# Patient Record
Sex: FEMALE | Race: WHITE | NOT HISPANIC OR LATINO | Employment: OTHER | URBAN - METROPOLITAN AREA
[De-identification: names, ages, dates, MRNs, and addresses within clinical notes are randomized per-mention and may not be internally consistent; named-entity substitution may affect disease eponyms.]

---

## 2021-03-02 ENCOUNTER — TRANSFERRED RECORDS (OUTPATIENT)
Dept: HEALTH INFORMATION MANAGEMENT | Facility: CLINIC | Age: 86
End: 2021-03-02

## 2022-05-11 ENCOUNTER — HOSPITAL ENCOUNTER (INPATIENT)
Facility: CLINIC | Age: 87
LOS: 10 days | Discharge: NURSING, REHAB OR LONG-TERM CARE | DRG: 555 | End: 2022-05-23
Attending: EMERGENCY MEDICINE | Admitting: HOSPITALIST
Payer: MEDICARE

## 2022-05-11 DIAGNOSIS — R05.9 COUGH: ICD-10-CM

## 2022-05-11 DIAGNOSIS — M25.561 ACUTE BILATERAL KNEE PAIN: ICD-10-CM

## 2022-05-11 DIAGNOSIS — L29.9 ITCHING: ICD-10-CM

## 2022-05-11 DIAGNOSIS — I10 BENIGN ESSENTIAL HYPERTENSION: Primary | ICD-10-CM

## 2022-05-11 DIAGNOSIS — M62.81 GENERALIZED MUSCLE WEAKNESS: ICD-10-CM

## 2022-05-11 DIAGNOSIS — U07.1 INFECTION DUE TO 2019 NOVEL CORONAVIRUS: ICD-10-CM

## 2022-05-11 DIAGNOSIS — M25.562 ACUTE BILATERAL KNEE PAIN: ICD-10-CM

## 2022-05-11 DIAGNOSIS — R52 ACHES: ICD-10-CM

## 2022-05-11 PROCEDURE — 99285 EMERGENCY DEPT VISIT HI MDM: CPT | Mod: 25

## 2022-05-12 ENCOUNTER — APPOINTMENT (OUTPATIENT)
Dept: PHYSICAL THERAPY | Facility: CLINIC | Age: 87
DRG: 555 | End: 2022-05-12
Attending: HOSPITALIST
Payer: MEDICARE

## 2022-05-12 ENCOUNTER — APPOINTMENT (OUTPATIENT)
Dept: MRI IMAGING | Facility: CLINIC | Age: 87
DRG: 555 | End: 2022-05-12
Attending: PHYSICIAN ASSISTANT
Payer: MEDICARE

## 2022-05-12 ENCOUNTER — APPOINTMENT (OUTPATIENT)
Dept: GENERAL RADIOLOGY | Facility: CLINIC | Age: 87
DRG: 555 | End: 2022-05-12
Attending: EMERGENCY MEDICINE
Payer: MEDICARE

## 2022-05-12 PROBLEM — M62.81 GENERALIZED MUSCLE WEAKNESS: Status: ACTIVE | Noted: 2022-05-12

## 2022-05-12 PROBLEM — M25.561 ACUTE BILATERAL KNEE PAIN: Status: ACTIVE | Noted: 2022-05-12

## 2022-05-12 PROBLEM — U07.1 INFECTION DUE TO 2019 NOVEL CORONAVIRUS: Status: ACTIVE | Noted: 2022-05-12

## 2022-05-12 PROBLEM — M25.562 ACUTE BILATERAL KNEE PAIN: Status: ACTIVE | Noted: 2022-05-12

## 2022-05-12 LAB
ANION GAP SERPL CALCULATED.3IONS-SCNC: 6 MMOL/L (ref 3–14)
BASOPHILS # BLD AUTO: 0 10E3/UL (ref 0–0.2)
BASOPHILS NFR BLD AUTO: 0 %
BUN SERPL-MCNC: 28 MG/DL (ref 7–30)
CALCIUM SERPL-MCNC: 9.3 MG/DL (ref 8.5–10.1)
CHLORIDE BLD-SCNC: 103 MMOL/L (ref 94–109)
CK SERPL-CCNC: 652 U/L (ref 30–225)
CO2 SERPL-SCNC: 26 MMOL/L (ref 20–32)
CREAT SERPL-MCNC: 1.22 MG/DL (ref 0.52–1.04)
EOSINOPHIL # BLD AUTO: 0 10E3/UL (ref 0–0.7)
EOSINOPHIL NFR BLD AUTO: 0 %
ERYTHROCYTE [DISTWIDTH] IN BLOOD BY AUTOMATED COUNT: 12.5 % (ref 10–15)
GFR SERPL CREATININE-BSD FRML MDRD: 43 ML/MIN/1.73M2
GLUCOSE BLD-MCNC: 180 MG/DL (ref 70–99)
GLUCOSE BLDC GLUCOMTR-MCNC: 105 MG/DL (ref 70–99)
GLUCOSE BLDC GLUCOMTR-MCNC: 140 MG/DL (ref 70–99)
GLUCOSE BLDC GLUCOMTR-MCNC: 88 MG/DL (ref 70–99)
GLUCOSE BLDC GLUCOMTR-MCNC: 89 MG/DL (ref 70–99)
HCT VFR BLD AUTO: 35.9 % (ref 35–47)
HGB BLD-MCNC: 11.6 G/DL (ref 11.7–15.7)
HOLD SPECIMEN: NORMAL
HOLD SPECIMEN: NORMAL
IMM GRANULOCYTES # BLD: 0.1 10E3/UL
IMM GRANULOCYTES NFR BLD: 1 %
LYMPHOCYTES # BLD AUTO: 0.5 10E3/UL (ref 0.8–5.3)
LYMPHOCYTES NFR BLD AUTO: 5 %
MCH RBC QN AUTO: 30.2 PG (ref 26.5–33)
MCHC RBC AUTO-ENTMCNC: 32.3 G/DL (ref 31.5–36.5)
MCV RBC AUTO: 94 FL (ref 78–100)
MONOCYTES # BLD AUTO: 0.9 10E3/UL (ref 0–1.3)
MONOCYTES NFR BLD AUTO: 11 %
NEUTROPHILS # BLD AUTO: 7 10E3/UL (ref 1.6–8.3)
NEUTROPHILS NFR BLD AUTO: 83 %
NRBC # BLD AUTO: 0 10E3/UL
NRBC BLD AUTO-RTO: 0 /100
PLATELET # BLD AUTO: 211 10E3/UL (ref 150–450)
POTASSIUM BLD-SCNC: 4.1 MMOL/L (ref 3.4–5.3)
RBC # BLD AUTO: 3.84 10E6/UL (ref 3.8–5.2)
SARS-COV-2 RNA RESP QL NAA+PROBE: POSITIVE
SODIUM SERPL-SCNC: 135 MMOL/L (ref 133–144)
WBC # BLD AUTO: 8.4 10E3/UL (ref 4–11)

## 2022-05-12 PROCEDURE — 73560 X-RAY EXAM OF KNEE 1 OR 2: CPT | Mod: 50

## 2022-05-12 PROCEDURE — 36415 COLL VENOUS BLD VENIPUNCTURE: CPT | Performed by: EMERGENCY MEDICINE

## 2022-05-12 PROCEDURE — G0378 HOSPITAL OBSERVATION PER HR: HCPCS

## 2022-05-12 PROCEDURE — C9803 HOPD COVID-19 SPEC COLLECT: HCPCS

## 2022-05-12 PROCEDURE — 97110 THERAPEUTIC EXERCISES: CPT | Mod: GP

## 2022-05-12 PROCEDURE — 96372 THER/PROPH/DIAG INJ SC/IM: CPT | Performed by: HOSPITALIST

## 2022-05-12 PROCEDURE — 250N000012 HC RX MED GY IP 250 OP 636 PS 637: Performed by: HOSPITALIST

## 2022-05-12 PROCEDURE — 97162 PT EVAL MOD COMPLEX 30 MIN: CPT | Mod: GP

## 2022-05-12 PROCEDURE — 82550 ASSAY OF CK (CPK): CPT | Performed by: EMERGENCY MEDICINE

## 2022-05-12 PROCEDURE — 250N000013 HC RX MED GY IP 250 OP 250 PS 637: Performed by: EMERGENCY MEDICINE

## 2022-05-12 PROCEDURE — 82962 GLUCOSE BLOOD TEST: CPT

## 2022-05-12 PROCEDURE — 80048 BASIC METABOLIC PNL TOTAL CA: CPT | Performed by: EMERGENCY MEDICINE

## 2022-05-12 PROCEDURE — 85025 COMPLETE CBC W/AUTO DIFF WBC: CPT | Performed by: EMERGENCY MEDICINE

## 2022-05-12 PROCEDURE — 250N000013 HC RX MED GY IP 250 OP 250 PS 637: Performed by: HOSPITALIST

## 2022-05-12 PROCEDURE — 99220 PR INITIAL OBSERVATION CARE,LEVEL III: CPT | Performed by: HOSPITALIST

## 2022-05-12 PROCEDURE — 99207 PR NO BILLABLE SERVICE THIS VISIT: CPT | Performed by: HOSPITALIST

## 2022-05-12 PROCEDURE — U0005 INFEC AGEN DETEC AMPLI PROBE: HCPCS | Performed by: EMERGENCY MEDICINE

## 2022-05-12 PROCEDURE — 97530 THERAPEUTIC ACTIVITIES: CPT | Mod: GP

## 2022-05-12 PROCEDURE — 72148 MRI LUMBAR SPINE W/O DYE: CPT | Mod: MG

## 2022-05-12 RX ORDER — ASPIRIN 325 MG
650 TABLET ORAL ONCE
Status: COMPLETED | OUTPATIENT
Start: 2022-05-12 | End: 2022-05-12

## 2022-05-12 RX ORDER — TRIAMCINOLONE ACETONIDE 1 MG/G
CREAM TOPICAL PRN
COMMUNITY
End: 2023-01-01

## 2022-05-12 RX ORDER — AMLODIPINE BESYLATE 2.5 MG/1
2.5 TABLET ORAL DAILY
Status: DISCONTINUED | OUTPATIENT
Start: 2022-05-12 | End: 2022-05-19

## 2022-05-12 RX ORDER — CLOPIDOGREL BISULFATE 75 MG/1
75 TABLET ORAL DAILY
Status: DISCONTINUED | OUTPATIENT
Start: 2022-05-12 | End: 2022-05-23 | Stop reason: HOSPADM

## 2022-05-12 RX ORDER — BUMETANIDE 0.5 MG/1
0.5 TABLET ORAL DAILY
COMMUNITY
Start: 2023-01-01

## 2022-05-12 RX ORDER — ACETAMINOPHEN 500 MG
1000 TABLET ORAL AT BEDTIME
Status: DISCONTINUED | OUTPATIENT
Start: 2022-05-12 | End: 2022-05-23 | Stop reason: HOSPADM

## 2022-05-12 RX ORDER — LISINOPRIL 20 MG/1
20 TABLET ORAL DAILY
Status: DISCONTINUED | OUTPATIENT
Start: 2022-05-12 | End: 2022-05-23 | Stop reason: HOSPADM

## 2022-05-12 RX ORDER — CLOPIDOGREL BISULFATE 75 MG/1
75 TABLET ORAL DAILY
COMMUNITY
End: 2023-01-01

## 2022-05-12 RX ORDER — BUMETANIDE 0.5 MG/1
0.5 TABLET ORAL DAILY
Status: DISCONTINUED | OUTPATIENT
Start: 2022-05-12 | End: 2022-05-23 | Stop reason: HOSPADM

## 2022-05-12 RX ORDER — LISINOPRIL 20 MG/1
20 TABLET ORAL DAILY
COMMUNITY
End: 2023-01-01

## 2022-05-12 RX ORDER — DEXTROSE MONOHYDRATE 25 G/50ML
25-50 INJECTION, SOLUTION INTRAVENOUS
Status: DISCONTINUED | OUTPATIENT
Start: 2022-05-12 | End: 2022-05-23 | Stop reason: HOSPADM

## 2022-05-12 RX ORDER — AMLODIPINE BESYLATE 2.5 MG/1
2.5 TABLET ORAL DAILY
Status: ON HOLD | COMMUNITY
End: 2022-05-23

## 2022-05-12 RX ORDER — AMOXICILLIN 250 MG
1 CAPSULE ORAL 2 TIMES DAILY PRN
Status: DISCONTINUED | OUTPATIENT
Start: 2022-05-12 | End: 2022-05-23 | Stop reason: HOSPADM

## 2022-05-12 RX ORDER — NICOTINE POLACRILEX 4 MG
15-30 LOZENGE BUCCAL
Status: DISCONTINUED | OUTPATIENT
Start: 2022-05-12 | End: 2022-05-23 | Stop reason: HOSPADM

## 2022-05-12 RX ORDER — ACETAMINOPHEN 650 MG/1
650 SUPPOSITORY RECTAL EVERY 6 HOURS PRN
Status: DISCONTINUED | OUTPATIENT
Start: 2022-05-12 | End: 2022-05-23 | Stop reason: HOSPADM

## 2022-05-12 RX ORDER — NYSTATIN 100000 [USP'U]/G
POWDER TOPICAL PRN
COMMUNITY
End: 2022-06-09

## 2022-05-12 RX ORDER — ONDANSETRON 4 MG/1
4 TABLET, ORALLY DISINTEGRATING ORAL EVERY 6 HOURS PRN
Status: DISCONTINUED | OUTPATIENT
Start: 2022-05-12 | End: 2022-05-23 | Stop reason: HOSPADM

## 2022-05-12 RX ORDER — ACETAMINOPHEN 500 MG
500 TABLET ORAL EVERY 6 HOURS PRN
COMMUNITY

## 2022-05-12 RX ORDER — ONDANSETRON 2 MG/ML
4 INJECTION INTRAMUSCULAR; INTRAVENOUS EVERY 6 HOURS PRN
Status: DISCONTINUED | OUTPATIENT
Start: 2022-05-12 | End: 2022-05-23 | Stop reason: HOSPADM

## 2022-05-12 RX ORDER — AMOXICILLIN 250 MG
2 CAPSULE ORAL 2 TIMES DAILY PRN
Status: DISCONTINUED | OUTPATIENT
Start: 2022-05-12 | End: 2022-05-23 | Stop reason: HOSPADM

## 2022-05-12 RX ORDER — ACETAMINOPHEN 325 MG/1
650 TABLET ORAL EVERY 6 HOURS PRN
Status: DISCONTINUED | OUTPATIENT
Start: 2022-05-12 | End: 2022-05-23 | Stop reason: HOSPADM

## 2022-05-12 RX ADMIN — ACETAMINOPHEN 650 MG: 325 TABLET ORAL at 17:15

## 2022-05-12 RX ADMIN — DICLOFENAC SODIUM 2 G: 10 GEL TOPICAL at 15:13

## 2022-05-12 RX ADMIN — ACETAMINOPHEN 650 MG: 325 TABLET ORAL at 08:01

## 2022-05-12 RX ADMIN — LISINOPRIL 20 MG: 20 TABLET ORAL at 17:15

## 2022-05-12 RX ADMIN — DICLOFENAC SODIUM 2 G: 10 GEL TOPICAL at 22:37

## 2022-05-12 RX ADMIN — INSULIN ASPART 1 UNITS: 100 INJECTION, SOLUTION INTRAVENOUS; SUBCUTANEOUS at 09:28

## 2022-05-12 RX ADMIN — DICLOFENAC SODIUM 2 G: 10 GEL TOPICAL at 17:24

## 2022-05-12 RX ADMIN — CLOPIDOGREL BISULFATE 75 MG: 75 TABLET ORAL at 17:15

## 2022-05-12 RX ADMIN — ASPIRIN 325 MG ORAL TABLET 650 MG: 325 PILL ORAL at 03:21

## 2022-05-12 RX ADMIN — AMLODIPINE BESYLATE 2.5 MG: 2.5 TABLET ORAL at 17:15

## 2022-05-12 RX ADMIN — ACETAMINOPHEN 1000 MG: 500 TABLET, FILM COATED ORAL at 22:37

## 2022-05-12 ASSESSMENT — ENCOUNTER SYMPTOMS
MYALGIAS: 1
WEAKNESS: 1

## 2022-05-12 NOTE — ED NOTES
Bed: ED05  Expected date:   Expected time:   Means of arrival:   Comments:  Stephanie 531 [id number] can't bear weight on left leg, no recent fall eta 2203

## 2022-05-12 NOTE — PROGRESS NOTES
Pt has consistently told nursing and PT (L) knee is more painful.  Call placed to TRISTIAN Agustin to clarify what to CT scan and she said hold off on CT scans until lumbar MRI completed.  Blood sugars were 88  & 105.  Solorio intact but does have an odor to it.  Peter ankles/ft are edematous.  RA is 95% but pt does have a nonproductive cough.  Pt denies any SOB or breathing issues.

## 2022-05-12 NOTE — PROGRESS NOTES
.RECEIVING UNIT ED HANDOFF REVIEW    ED Nurse Handoff Report was reviewed by: Emilee Cason RN on May 12, 2022 at 10:58 AM

## 2022-05-12 NOTE — PROGRESS NOTES
Hospitalist brief update note    Patient was evaluated this afternoon after she arrived to floor from ER.  Patient reports bilateral knee pain, more on right than left.  She denies any increasing dyspnea, cough, chest pain.  No runny nose.  No nausea or diarrhea.    Labs on admission noted, admission H&P reviewed.  X-ray of the knee shows a small effusion on right and small to moderate on the left.  Continue Tylenol for pain control.  Use ice pack.  Patient on Plavix, given history of fall, this could be hemarthrosis, although pain not significantly worse and is controlled with tylenol  Ortho consult.   PTA meds reviewed, resumed  AM labs including CK.   Hold diuretic today and as long as good PO intake, and CK trends down, resume in AM    Discussed with patient and KAYDEN Arriola MD  Hospitalist

## 2022-05-12 NOTE — ED PROVIDER NOTES
"  History   Chief Complaint:  Leg Pain     HPI   Sarah Noble is a 87 year old female with history of hypertension, type II diabetes, and a CVA who presents via EMS For evaluation of leg pain. Tonight after sitting down for an extended period of time the patient was unable to get up and bear weight due to new pain and weakness in her right leg. Her daughter was able to help her to the bathroom with her walker but when she was unable to get off the toilet EMS was called to help her. She continued to be unable to bear weight on the right leg with EMS prompting them to bring her into the ED. Here in the ED she complains primarily of pain in the bottom of her right foot. She notes that she typically does not sit down for as long as she did tonight. She denies any recent falls or trauma to the leg.     Review of Systems   Musculoskeletal: Positive for myalgias (right leg, right foot).   Neurological: Positive for weakness.   All other systems reviewed and are negative.      Allergies:  Aspirin  Sulfa     Medications:  Cefaclor   Bumex  Lisinopril  Triamcinolone   Plavix   Amlodipine     Past Medical History:     CVA  Hypertension  Diabetes mellitus, type II   Chronic kidney disease   Sleep apnea  Chronic back pain   GERD   Spinal stenosis   Malignant neoplasm of uterus     Past Surgical History:    Knee replacement bilateral  Hernia repair  Hysterectomy, bilateral salpingo-oophorectomy, bilateral pelvic/PA lymphadenectomy     Family History:    Heart disease - Father   Alzheimer's - Mother     Social History:  The patient presents to the ED via EMS.   The patient lives with her daughter.     Physical Exam     Patient Vitals for the past 24 hrs:   BP Temp Temp src Pulse Resp SpO2 Height   05/11/22 2329 (!) 131/98 99.1  F (37.3  C) Oral 90 18 94 % 1.626 m (5' 4\")       Physical Exam  Eye:  Pupils are equal, round, and reactive.  Extraocular movements intact.    ENT:  No rhinorrhea.  Moist mucus membranes.  Normal " tongue and tonsil.    Cardiac:  Regular rate and rhythm.  No murmurs, gallops, or rubs.    Pulmonary:  Clear to auscultation bilaterally.  No wheezes, rales, or rhonchi.    Abdomen:  Positive bowel sounds.  Abdomen is soft and non-distended, without focal tenderness.    Musculoskeletal:  Normal movement of all extremities without evidence for deficit. There is no reproducible pain with ranging the joints of the right leg.     Skin:  Warm and dry without rashes.    Neurologic:  Non-focal exam without asymmetric weakness or numbness. Focused right leg exam shows 5/5 strength at the hip, knee, and ankle with sensation intact throughout.     Psychiatric:  Normal affect with appropriate interaction with examiner.      Emergency Department Course     Imaging:  XR Knee Port Bilateral 1/2 Views    (Results Pending)   Report per radiology    Laboratory:  Labs Ordered and Resulted from Time of ED Arrival to Time of ED Departure   BASIC METABOLIC PANEL - Abnormal       Result Value    Sodium 135      Potassium 4.1      Chloride 103      Carbon Dioxide (CO2) 26      Anion Gap 6      Urea Nitrogen 28      Creatinine 1.22 (*)     Calcium 9.3      Glucose 180 (*)     GFR Estimate 43 (*)    CK TOTAL - Abnormal     (*)    COVID-19 VIRUS (CORONAVIRUS) BY PCR - Abnormal    SARS CoV2 PCR Positive (*)    CBC WITH PLATELETS AND DIFFERENTIAL - Abnormal    WBC Count 8.4      RBC Count 3.84      Hemoglobin 11.6 (*)     Hematocrit 35.9      MCV 94      MCH 30.2      MCHC 32.3      RDW 12.5      Platelet Count 211      % Neutrophils 83      % Lymphocytes 5      % Monocytes 11      % Eosinophils 0      % Basophils 0      % Immature Granulocytes 1      NRBCs per 100 WBC 0      Absolute Neutrophils 7.0      Absolute Lymphocytes 0.5 (*)     Absolute Monocytes 0.9      Absolute Eosinophils 0.0      Absolute Basophils 0.0      Absolute Immature Granulocytes 0.1      Absolute NRBCs 0.0          Emergency Department Course:     Reviewed:  I  reviewed nursing notes, vitals and past medical history    Assessments:  2358:  I obtained history and examined the patient as noted above.     0231: I updated and reassessed the patient.     Consults:  0251: I spoke with Dr. Slaughter of the hospitalist service regarding patient's presentation, findings, and plan of care.     Interventions:  Medications   sodium chloride (PF) 0.9% PF flush 3 mL (has no administration in time range)   sodium chloride (PF) 0.9% PF flush 3 mL (has no administration in time range)   aspirin (ASA) tablet 650 mg (has no administration in time range)       Disposition:  The patient was admitted to the hospital under the care of Dr. Slaughter.     Impression & Plan     Medical Decision Making:  This 87-year-old woman with chronic knee pain presents to us with complaints of increasing pain and weakness today.  The patient struggles to get about due to her chronic knee pain and weight.  However, when trying to get out of a chair this evening, it was even more difficult than usual and her daughter had to help her to the bathroom.  After she finished using the toilet, she was unable to get off the toilet on her own and fire had to come and help her.  She complained that both of her knees were terribly achy and she was weak and she was brought to the emergency department.    On exam, there is no evidence of trauma.  Her vital signs and labs are reassuring.  However, with an ambulatory challenge, she is unable to ambulate.  Therefore, I will obtain x-rays of her knees and I will admit her to the hospitalist service.    On the wait to be admitted, her COVID test returned positive.  She does note that there have been several family members with COVID recently.  However, she has not experienced nasal congestion, sore throat, or cough.  Nonetheless, she will be isolated and treated appropriately.  This may explain some of her weakness.  Hospitalist service was updated and the patient will be  admitted.     Diagnosis:    ICD-10-CM    1. Acute bilateral knee pain  M25.561     M25.562    2. Infection due to 2019 novel coronavirus  U07.1    3. Generalized muscle weakness  M62.81           Scribe Disclosure:  I, Nabil Mancia, am serving as a scribe at 11:26 PM on 5/11/2022 to document services personally performed by Trierweiler, Chad A, MD based on my observations and the provider's statements to me.           Trierweiler, Chad A, MD  05/12/22 1923

## 2022-05-12 NOTE — ED NOTES
"[facility]  ED Nurse Handoff Report    ED Chief complaint: Leg Pain (States was watching the news in her chair, when she went to get up her \"Right leg would not support me' her daughter helped her to bathroom, but then was unable to get up again and EMS was called. Denies headache, speech clear)      ED Diagnosis:   Final diagnoses:   Acute bilateral knee pain   Infection due to 2019 novel coronavirus   Generalized muscle weakness       Code Status: to be addressed by admitting doctor    Allergies: No Known Allergies    Patient Story: Pt lives at home with adult daughter. Pt states uses a walker. Pt reports no trauma. Was sitting in chair longer than usual about 3 hours watching the weather. Went to get up and R leg did not want to support her weight. And C/O pain in R foot. Daughter helped her up to bathroom but after sitting on toilet could not get up r/t weakness in R leg and pain in R leg.    Focused Assessment:  Pt alert voices needs. Moves all extremities, Hx bilateral knee replacement in past and use of walker. When attempting to walk in ED pt unable to stand with assist of 2 and use of walker. C/o pain in both legs and weakness. Pt has existing Solorio catheter to drainage bag. Had low grade fever upon arrival. Denies cough/SOB/ no sore throat or nausea.    Treatments and/or interventions provided: IV access, labs  Patient's response to treatments and/or interventions: tolerated well    To be done/followed up on inpatient unit:  unknown    Does this patient have any cognitive concerns?: none    Activity level - Baseline/Home:  Independent use of walker baseline  Activity Level - Current:   Unknown    Patient's Preferred language: English   Needed?: No    Isolation: None  Infection: Not Applicable  Patient tested for COVID 19 prior to admission: YES  Bariatric?: No    Vital Signs:   Vitals:    05/11/22 2329   BP: (!) 131/98   Pulse: 90   Resp: 18   Temp: 99.1  F (37.3  C)   TempSrc: " "Oral   SpO2: 94%   Height: 1.626 m (5' 4\")       Cardiac Rhythm:     Was the PSS-3 completed:   Yes  What interventions are required if any?               Family Comments: Daughter remains at home. Daughter also states that at home nurse changes joseph catheter at the end of each month.     Daughter would like to be updated throughout stay. Josefa Pompa [phone]      OBS brochure/video discussed/provided to patient/family: N/A              Name of person given brochure if not patient: n/a              Relationship to patient: n/a    For the majority of the shift this patient's behavior was Green.   Behavioral interventions performed were none.    ED NURSE PHONE NUMBER: *[phone]       "

## 2022-05-12 NOTE — PHARMACY-ADMISSION MEDICATION HISTORY
Pharmacy Medication History  Admission medication history interview status for the 5/11/2022  admission is complete. See EPIC admission navigator for prior to admission medications     Location of Interview: Phone with daughter and patient  Medication history sources: Daughter (over the phone) and outside med report    Significant changes made to the medication list:  Added: Bumex, Plavix, Lisinopril, Tylenol, Triamcinolone, Amlodipine, and Nystatin  Discontinued: Sucralfte    In the past week, patient estimated taking medication this percent of the time: greater than 90%    Additional medication history information:   None    Medication reconciliation completed by provider prior to medication history? No    Time spent in this activity: 1 hour    Prior to Admission medications    Medication Sig Last Dose Taking? Auth Provider   acetaminophen (TYLENOL) 500 MG tablet Take 1,000 mg by mouth At Bedtime For pain 5/10/2022 at hs Yes Unknown, Entered By History   amLODIPine (NORVASC) 2.5 MG tablet Take 2.5 mg by mouth daily 5/11/2022 at am Yes Unknown, Entered By History   bumetanide (BUMEX) 0.5 MG tablet Take 0.5 mg by mouth daily 5/11/2022 at am Yes Unknown, Entered By History   clopidogrel (PLAVIX) 75 MG tablet Take 75 mg by mouth daily 5/11/2022 at am Yes Unknown, Entered By History   lisinopril (ZESTRIL) 20 MG tablet Take 20 mg by mouth daily 5/11/2022 at am Yes Unknown, Entered By History   nystatin (MYCOSTATIN) 266002 UNIT/GM external powder Apply topically as needed prn at prn Yes Unknown, Entered By History   triamcinolone (KENALOG) 0.1 % external cream Apply topically as needed for irritation prn at prn Yes Unknown, Entered By History       The information provided in this note is only as accurate as the sources available at the time of update(s)

## 2022-05-12 NOTE — PLAN OF CARE
Goal Outcome Evaluation:Pt admitted from ER for (L) knee pain.  Pt COVID (+) but has mild symptoms of a cough.  Pt has existing joseph for retention on admission.  96% on RA.  IS only able to get 500.  Stated (L) knee is painful.  Has jonathan ankle swelling but denies numbness & tingling.  Blood sugar was 105.

## 2022-05-12 NOTE — ED NOTES
Attempted to ambulate patient with walker and assist x2. When attempting to stand patient states she is unable to bear any weight on either leg due to significant pain.

## 2022-05-12 NOTE — PROGRESS NOTES
Ortho brief note    Patient with recent fall at home after right LE weakness reported after prolonged sitting  Patient notes increased pain in right knee with ambulation but no pain at rest and with ROM of the right knee    XR without concern for fracture, right knee revision prosthesis in place without evidence of loosening or cortical stress    CT ordered of right femur/tibia to rule out occult fracture  If negative and patient continues to have pain following a radicular pattern recommend MRI of spine and IP spine or neurosurgery follow-up    TTWB right LE at this time with walker until imaging reviewed    Nikole Blandon PAC

## 2022-05-12 NOTE — H&P
[facility]    History and Physical  Hospitalist     Date of Admission:  5/11/2022    Assessment & Plan   Sarah Noble is a 87 year old female with a past medical history of type 2 diabetes, hypertension, CKD stage III, obesity, chronic knee pain and back pain, chronic urinary retention with a indwelling Solorio catheter presents to hospital after fall for knee pain.    Bilateral knee pain  Patient with chronic bilateral knee pain. Had right knee pain at home now with worsening left knee pain. Has difficulty ambulating at baseline and uses a walker.  Patient underwent bilateral knee replacements approximately 20 years ago. The patient has a history of chronic pain for which she has used opiates in the past, but has been managing with non-opiate medications for the last year, will continue to try to avoid opiates.    likely needs placement in a tcu  -f/u pt, ot  -Follow-up x-rays bilateral knees (delayed due to COVID-19 positive status)  -Possible orthopedic consult depending on what is seen on the x-ray of her knees.  -Voltaren gel to bilateral knees, Tylenol as needed    Elevated CK  Mild elevation. Could be due to covid 19 or due to her sitting for 3 hours prior to her fall.   -encourage po intake.     Covid 19  Asymptomatic. Vaccinated. No indication for intervention.   -covid 19 precautions    Type 2 diabetes  Diet-controlled. Well controlled. Last a1c was 6.8%  -Insulin sliding scale while inpatient    HTN  Resume PTA meds once med rec is complete     CKD stage III  Renal function stable when compared to previous labs  -Avoid nephrotoxic medication  -Monitor renal function    MILLIE  Does not use CPAP    Chronic Urinary retention  Has a chronic indwelling Solorio catheter    Normocytic anemia  Chronic, stable.  Patient denies any bleeding.  -Outpatient follow-up    Code Status   Full Code  DVT ppx: lovenox sc  Expected length of stay less than 2 days      Primary Care Physician   Luther MAJOR  [place]    Chief Complaint   Generalized weakness    History obtained from the patient    History of Present Illness   Sarah Noble is a 87 year old female with a past medical history of type 2 diabetes, hypertension, CKD stage III, obesity, chronic knee pain and back pain, chronic urinary retention with a indwelling Solorio catheter presents to hospital after fall for knee pain.  The patient reports that she has chronic knee pain which is worse if she sits down for an extended period of time.  On the evening of May 11 she sat down for approximately 3 hours and when she went to get up she had 8 out of 10 pain in her right knee causing her weakness.  With assistance of her daughter and her walker she was able to go to the bathroom however when she got up from the bathroom her leg gave out and she fell to the ground.  She did not hit her head or lose consciousness.  Due to pain and difficulty ambulating she called EMS who brought her to the hospital.  She reports soreness in her left knee and pain with movement of both knees.  The patient has a history of bilateral knee replacement approximately 20 years ago.  The patient provides no other complaints and denies any fevers, chills, shortness of breath, chest pain, myalgias, diarrhea, nausea, vomiting.  The patient tested positive for COVID-19 in the emergency room.  When I informed the patient she was apprised as she denies experiencing any symptoms.  The patient has received 3 doses of the Pfizer COVID-19 vaccine with last dose on March 12.  She had sick contact with COVID-19 positive individuals on Mother's Day.    Past Medical History    I have reviewed this patient's medical history and updated it with pertinent information if needed.   Past Medical History:   Diagnosis Date     Diabetes (H)      Hypertension        Past Surgical History   I have reviewed this patient's surgical history and updated it with pertinent information if needed.  Past Surgical History:    Procedure Laterality Date     ORTHOPEDIC SURGERY         Prior to Admission Medications   Prior to Admission Medications   Prescriptions Last Dose Informant Patient Reported? Taking?   SUCRALFATE 1 GM OR TABS   No No   Si TAB 4 TIMES DAILY, BEFORE MEALS AND AT BEDTIME      Facility-Administered Medications: None     Allergies   No Known Allergies    Social History   I have reviewed this patient's social history and updated it with pertinent information if needed. Sarah Noble  reports that she has never smoked. She has never used smokeless tobacco. She reports previous alcohol use. She reports previous drug use.    Family History   I have reviewed this patient's family history and updated it with pertinent information if needed.   Father with heart disease  Mother with Alzheimer's disease    Review of Systems   The 10 point Review of Systems is negative other than noted in the HPI or here.     Physical Exam   Temp: 99.1  F (37.3  C) Temp src: Oral BP: (!) 131/98 Pulse: 90   Resp: 18 SpO2: 94 %      Vital Signs with Ranges  Temp:  [99.1  F (37.3  C)] 99.1  F (37.3  C)  Pulse:  [90] 90  Resp:  [18] 18  BP: (131)/(98) 131/98  SpO2:  [94 %] 94 %  0 lbs 0 oz  Physical Exam  Vitals reviewed.   Constitutional:       Appearance: Normal appearance. She is obese.      Comments: Pleasant lady seen resting in bed comfortably no apparent distress   HENT:      Head: Normocephalic and atraumatic.      Mouth/Throat:      Mouth: Mucous membranes are moist.      Pharynx: Oropharynx is clear.   Eyes:      Extraocular Movements: Extraocular movements intact.      Conjunctiva/sclera: Conjunctivae normal.      Pupils: Pupils are equal, round, and reactive to light.   Cardiovascular:      Rate and Rhythm: Normal rate and regular rhythm.      Pulses: Normal pulses.      Heart sounds: Normal heart sounds. No murmur heard.  Pulmonary:      Effort: Pulmonary effort is normal.      Breath sounds: Normal breath sounds. No wheezing,  rhonchi or rales.   Abdominal:      General: Abdomen is flat. Bowel sounds are normal.      Palpations: Abdomen is soft. There is no mass.      Tenderness: There is no abdominal tenderness. There is no guarding.   Musculoskeletal:         General: No swelling. Normal range of motion.      Cervical back: Normal range of motion and neck supple.      Comments: Tenderness to palpation of bilateral knees.  Old surgical scars over knees well-healed.   Skin:     General: Skin is warm and dry.   Neurological:      General: No focal deficit present.      Mental Status: She is alert and oriented to person, place, and time. Mental status is at baseline.      Cranial Nerves: No cranial nerve deficit.      Comments: Difficulty moving lower extremities due to pain.  Sensation intact.

## 2022-05-13 ENCOUNTER — APPOINTMENT (OUTPATIENT)
Dept: CT IMAGING | Facility: CLINIC | Age: 87
DRG: 555 | End: 2022-05-13
Attending: PHYSICIAN ASSISTANT
Payer: MEDICARE

## 2022-05-13 ENCOUNTER — APPOINTMENT (OUTPATIENT)
Dept: ULTRASOUND IMAGING | Facility: CLINIC | Age: 87
DRG: 555 | End: 2022-05-13
Attending: PHYSICIAN ASSISTANT
Payer: MEDICARE

## 2022-05-13 ENCOUNTER — APPOINTMENT (OUTPATIENT)
Dept: GENERAL RADIOLOGY | Facility: CLINIC | Age: 87
DRG: 555 | End: 2022-05-13
Attending: INTERNAL MEDICINE
Payer: MEDICARE

## 2022-05-13 LAB
ALBUMIN UR-MCNC: 30 MG/DL
ANION GAP SERPL CALCULATED.3IONS-SCNC: 5 MMOL/L (ref 3–14)
APPEARANCE UR: ABNORMAL
BACTERIA #/AREA URNS HPF: ABNORMAL /HPF
BASOPHILS # BLD AUTO: 0 10E3/UL (ref 0–0.2)
BASOPHILS NFR BLD AUTO: 0 %
BILIRUB UR QL STRIP: NEGATIVE
BUN SERPL-MCNC: 27 MG/DL (ref 7–30)
CALCIUM SERPL-MCNC: 9 MG/DL (ref 8.5–10.1)
CHLORIDE BLD-SCNC: 104 MMOL/L (ref 94–109)
CK SERPL-CCNC: 294 U/L (ref 30–225)
CO2 SERPL-SCNC: 26 MMOL/L (ref 20–32)
COLOR UR AUTO: ABNORMAL
CREAT SERPL-MCNC: 1.21 MG/DL (ref 0.52–1.04)
CRP SERPL-MCNC: 63.4 MG/L (ref 0–8)
EOSINOPHIL # BLD AUTO: 0 10E3/UL (ref 0–0.7)
EOSINOPHIL NFR BLD AUTO: 1 %
ERYTHROCYTE [DISTWIDTH] IN BLOOD BY AUTOMATED COUNT: 12.5 % (ref 10–15)
GFR SERPL CREATININE-BSD FRML MDRD: 43 ML/MIN/1.73M2
GLUCOSE BLD-MCNC: 131 MG/DL (ref 70–99)
GLUCOSE BLDC GLUCOMTR-MCNC: 100 MG/DL (ref 70–99)
GLUCOSE BLDC GLUCOMTR-MCNC: 113 MG/DL (ref 70–99)
GLUCOSE BLDC GLUCOMTR-MCNC: 115 MG/DL (ref 70–99)
GLUCOSE BLDC GLUCOMTR-MCNC: 120 MG/DL (ref 70–99)
GLUCOSE BLDC GLUCOMTR-MCNC: 96 MG/DL (ref 70–99)
GLUCOSE BLDC GLUCOMTR-MCNC: 97 MG/DL (ref 70–99)
GLUCOSE UR STRIP-MCNC: NEGATIVE MG/DL
HCT VFR BLD AUTO: 34.4 % (ref 35–47)
HGB BLD-MCNC: 11.1 G/DL (ref 11.7–15.7)
HGB UR QL STRIP: ABNORMAL
HYALINE CASTS: 2 /LPF
IMM GRANULOCYTES # BLD: 0 10E3/UL
IMM GRANULOCYTES NFR BLD: 1 %
KETONES UR STRIP-MCNC: NEGATIVE MG/DL
LEUKOCYTE ESTERASE UR QL STRIP: ABNORMAL
LYMPHOCYTES # BLD AUTO: 0.8 10E3/UL (ref 0.8–5.3)
LYMPHOCYTES NFR BLD AUTO: 15 %
MCH RBC QN AUTO: 30.7 PG (ref 26.5–33)
MCHC RBC AUTO-ENTMCNC: 32.3 G/DL (ref 31.5–36.5)
MCV RBC AUTO: 95 FL (ref 78–100)
MONOCYTES # BLD AUTO: 0.7 10E3/UL (ref 0–1.3)
MONOCYTES NFR BLD AUTO: 13 %
MUCOUS THREADS #/AREA URNS LPF: PRESENT /LPF
NEUTROPHILS # BLD AUTO: 3.8 10E3/UL (ref 1.6–8.3)
NEUTROPHILS NFR BLD AUTO: 70 %
NITRATE UR QL: NEGATIVE
NRBC # BLD AUTO: 0 10E3/UL
NRBC BLD AUTO-RTO: 0 /100
PH UR STRIP: 7 [PH] (ref 5–7)
PLATELET # BLD AUTO: 162 10E3/UL (ref 150–450)
POTASSIUM BLD-SCNC: 4 MMOL/L (ref 3.4–5.3)
RBC # BLD AUTO: 3.61 10E6/UL (ref 3.8–5.2)
RBC URINE: >182 /HPF
SODIUM SERPL-SCNC: 135 MMOL/L (ref 133–144)
SP GR UR STRIP: 1.01 (ref 1–1.03)
SQUAMOUS EPITHELIAL: 1 /HPF
UROBILINOGEN UR STRIP-MCNC: NORMAL MG/DL
WBC # BLD AUTO: 5.4 10E3/UL (ref 4–11)
WBC URINE: 15 /HPF

## 2022-05-13 PROCEDURE — 80048 BASIC METABOLIC PNL TOTAL CA: CPT | Performed by: HOSPITALIST

## 2022-05-13 PROCEDURE — 99233 SBSQ HOSP IP/OBS HIGH 50: CPT | Performed by: INTERNAL MEDICINE

## 2022-05-13 PROCEDURE — 93970 EXTREMITY STUDY: CPT

## 2022-05-13 PROCEDURE — 87040 BLOOD CULTURE FOR BACTERIA: CPT | Performed by: INTERNAL MEDICINE

## 2022-05-13 PROCEDURE — 82962 GLUCOSE BLOOD TEST: CPT

## 2022-05-13 PROCEDURE — 71045 X-RAY EXAM CHEST 1 VIEW: CPT

## 2022-05-13 PROCEDURE — 82550 ASSAY OF CK (CPK): CPT | Performed by: HOSPITALIST

## 2022-05-13 PROCEDURE — G0378 HOSPITAL OBSERVATION PER HR: HCPCS

## 2022-05-13 PROCEDURE — 120N000001 HC R&B MED SURG/OB

## 2022-05-13 PROCEDURE — 250N000013 HC RX MED GY IP 250 OP 250 PS 637: Performed by: HOSPITALIST

## 2022-05-13 PROCEDURE — 36415 COLL VENOUS BLD VENIPUNCTURE: CPT | Performed by: INTERNAL MEDICINE

## 2022-05-13 PROCEDURE — G1004 CDSM NDSC: HCPCS

## 2022-05-13 PROCEDURE — 87086 URINE CULTURE/COLONY COUNT: CPT | Performed by: INTERNAL MEDICINE

## 2022-05-13 PROCEDURE — 36415 COLL VENOUS BLD VENIPUNCTURE: CPT | Performed by: HOSPITALIST

## 2022-05-13 PROCEDURE — 250N000011 HC RX IP 250 OP 636: Performed by: INTERNAL MEDICINE

## 2022-05-13 PROCEDURE — 81001 URINALYSIS AUTO W/SCOPE: CPT | Performed by: INTERNAL MEDICINE

## 2022-05-13 PROCEDURE — 85025 COMPLETE CBC W/AUTO DIFF WBC: CPT | Performed by: HOSPITALIST

## 2022-05-13 PROCEDURE — 86140 C-REACTIVE PROTEIN: CPT | Performed by: PHYSICIAN ASSISTANT

## 2022-05-13 RX ORDER — ENOXAPARIN SODIUM 100 MG/ML
40 INJECTION SUBCUTANEOUS EVERY 24 HOURS
Status: DISCONTINUED | OUTPATIENT
Start: 2022-05-13 | End: 2022-05-23 | Stop reason: HOSPADM

## 2022-05-13 RX ORDER — MEROPENEM 1 G/1
1 INJECTION, POWDER, FOR SOLUTION INTRAVENOUS EVERY 8 HOURS
Status: DISCONTINUED | OUTPATIENT
Start: 2022-05-13 | End: 2022-05-13

## 2022-05-13 RX ORDER — MEROPENEM 1 G/1
1 INJECTION, POWDER, FOR SOLUTION INTRAVENOUS EVERY 12 HOURS
Status: DISCONTINUED | OUTPATIENT
Start: 2022-05-13 | End: 2022-05-15

## 2022-05-13 RX ADMIN — DICLOFENAC SODIUM 2 G: 10 GEL TOPICAL at 09:37

## 2022-05-13 RX ADMIN — DICLOFENAC SODIUM 2 G: 10 GEL TOPICAL at 14:01

## 2022-05-13 RX ADMIN — ENOXAPARIN SODIUM 40 MG: 40 INJECTION SUBCUTANEOUS at 16:54

## 2022-05-13 RX ADMIN — LISINOPRIL 20 MG: 20 TABLET ORAL at 09:37

## 2022-05-13 RX ADMIN — MEROPENEM 1 G: 1 INJECTION, POWDER, FOR SOLUTION INTRAVENOUS at 21:29

## 2022-05-13 RX ADMIN — CLOPIDOGREL BISULFATE 75 MG: 75 TABLET ORAL at 09:37

## 2022-05-13 RX ADMIN — DICLOFENAC SODIUM 2 G: 10 GEL TOPICAL at 21:27

## 2022-05-13 RX ADMIN — DICLOFENAC SODIUM 2 G: 10 GEL TOPICAL at 16:54

## 2022-05-13 RX ADMIN — ACETAMINOPHEN 1000 MG: 500 TABLET, FILM COATED ORAL at 21:27

## 2022-05-13 RX ADMIN — AMLODIPINE BESYLATE 2.5 MG: 2.5 TABLET ORAL at 09:37

## 2022-05-13 ASSESSMENT — ACTIVITIES OF DAILY LIVING (ADL)
ADLS_ACUITY_SCORE: 38
ADLS_ACUITY_SCORE: 40
ADLS_ACUITY_SCORE: 38
ADLS_ACUITY_SCORE: 38

## 2022-05-13 NOTE — PROGRESS NOTES
Ortho update     Spoke with RN on floor  Patient having severe pain bilateral LE; L>R    Holding the CT scan at this time  MRI lumbar spine ordered to further eval LE radicular symptoms   No red flag symptoms     Will continue to follow   Treatment plan based on imaging    Nikole Blandon PAC

## 2022-05-13 NOTE — CONSULTS
[facility]  Consult Note - Orthopedics    Date of Admission:  5/11/2022  Consult Requested by: Dr Arriola  Reason for Consult:  Knee pain    Assessment & Plan   Sarah Noble is a 87 year old female with stage III CKD, diabetes mellitus, hypertension, sleep apnea, urinary retention with chronic indwelling Solorio, DJD status post bilateral TKA who was admitted to [facility] on 5/12/2022 with bilateral knee pain and COVID-19 infection.  Orthopedics consulted for knee pain.    Knee pain, L>R  Initially symptoms sound radicular in nature but later evolved into L>R knee pain alone.  History of bilateral knee replacements ~2002.  Positive for COVID-19 infection, however no evidence for systemic bacterial process.  Knee pain progressive over the last year.  Full details regarding pain were difficult to obtain as patient is quite fatigued from COVID-19 infection.  X-rays at time of admission demonstrated prior BKA's and small bilateral effusions but no evidence for fracture or dislocation.  MRI lumbar spine obtained and demonstrates a low-lying conus with diastematomyelia, chronic mild to moderate L1 compression fracture, multilevel spondylosis, severe DJD of bilateral SI joints (vs seronegative sacroiliitis), multilevel severe foraminal stenosis, and presacral edema that is nonspecific.   In light of COVID19 infection, Inflammatory markers unhelpful in delineation of occult infection however nothing on n exam to suggest overlying cellulitic process, septic bursitis, or septic joint.  Patient is able to tolerate passive range of motion of the knee joint.  Nontender palpation along the inferior, lateral, medial joint lines.  No patellar ballottement.  Calves are supple.   -- Etiology regarding acute knee pain is unclear.  Patient has obvious underlying degenerative disease and prior arthroplasties however this may not fully explain her symptoms.    -- MRI of lumbar spine, while abnormal, does not  demonstrate obvious corresponding pathology.   -- Recommend CT left knee for further evaluation.  --Will also get a duplex ultrasound bilateral lower extremity to rule out DVT in the setting of COVID-19 infection  -- Continue to treat with symptomatic cares for COVID-19  -- If CT does not suggest occult fracture, mobilize as able    Thank you for this consultation.  We will continue following with you.    TRISTIAN Bravo  [facility]    ______________________________________________________________________    History of Present Illness   Sarah Noble is a 87 year old female with stage III CKD, diabetes mellitus, hypertension, sleep apnea, urinary retention with chronic indwelling Solorio, DJD status post bilateral TKA who was admitted to [facility] on 5/12/2022 with bilateral knee pain and COVID-19 infection.  Orthopedics consulted for knee pain.    Review of Systems   A comprehensive 14 point review of systems was undertaken with pertinent positives and negatives as above and otherwise unremarkable.     Past Medical History    I have reviewed this patient's medical history and updated it with pertinent information if needed.   Past Medical History:   Diagnosis Date     Chronic kidney disease, stage III (moderate) (H)      Diabetes (H)      Hypertension      MILLIE (obstructive sleep apnea)      Urinary retention     Indwelling Solorio catheter       Past Surgical History   I have reviewed this patient's surgical history and updated it with pertinent information if needed.  Past Surgical History:   Procedure Laterality Date     ORTHOPEDIC SURGERY      Bilateral knee replacement       Social History   I have reviewed this patient's social history and updated it with pertinent information if needed.  Social History     Tobacco Use     Smoking status: Never Smoker     Smokeless tobacco: Never Used   Substance Use Topics     Alcohol use: Not Currently     Drug use: Not Currently         Family History   I have  reviewed this patient's family history and updated it with pertinent information if needed.   Family History   Problem Relation Age of Onset     Alzheimer Disease Mother      Heart Disease Father      Medications   Medications Prior to Admission   Medication Sig Dispense Refill Last Dose     acetaminophen (TYLENOL) 500 MG tablet Take 1,000 mg by mouth At Bedtime For pain   5/10/2022 at hs     amLODIPine (NORVASC) 2.5 MG tablet Take 2.5 mg by mouth daily   5/11/2022 at am     bumetanide (BUMEX) 0.5 MG tablet Take 0.5 mg by mouth daily   5/11/2022 at am     clopidogrel (PLAVIX) 75 MG tablet Take 75 mg by mouth daily   5/11/2022 at am     lisinopril (ZESTRIL) 20 MG tablet Take 20 mg by mouth daily   5/11/2022 at am     nystatin (MYCOSTATIN) 475115 UNIT/GM external powder Apply topically as needed   prn at prn     triamcinolone (KENALOG) 0.1 % external cream Apply topically as needed for irritation   prn at prn       Allergies   No Known Allergies    Physical Exam   Vital Signs: Temp: 99.8  F (37.7  C) Temp src: Oral BP: (!) 165/82 Pulse: 75   Resp: 18 SpO2: 94 % O2 Device: None (Room air)    Weight: 217 lbs 2.45 oz    GENERAL:  Elderly, appears to be feeling poorly, unwell but nontoxic.  Sounds congested and appears like a patient with acute COVID19 infection.  HEENT: Normocephalic, atraumatic.  Extra occular mm intact.    MUSCULOSKELETAL:  Moving x 4 spontaneously.  Able to lift left and right leg off gurney.  No significant pain with passive ROM of L knee joint.  Midline incision well healed.  Nontender palpation along the inferior, lateral, medial joint lines.  No patellar ballottement.  Calves are supple. Strength intact and symmetric in distal LE flexors and extensors.  Appears very deconditioned with poor muscle bulk but tone normal for age.    NEURO: Alert and oriented to self and place.  CN II-XII grossly intact and symmetric. .      Data   Results for orders placed or performed during the hospital encounter of  05/11/22 (from the past 24 hour(s))   Glucose by meter   Result Value Ref Range    GLUCOSE BY METER POCT 105 (H) 70 - 99 mg/dL   Glucose by meter   Result Value Ref Range    GLUCOSE BY METER POCT 88 70 - 99 mg/dL   MR Lumbar Spine w/o Contrast    Narrative    EXAM: MR LUMBAR SPINE W/O CONTRAST  LOCATION: [facility]  DATE/TIME: 5/12/2022 6:53 PM    INDICATION: Unknown primary malignancy, bone eval. Back pain.  COMPARISON: None.  TECHNIQUE: Routine Lumbar Spine MRI without IV contrast.    FINDINGS:   Transitional lumbosacral junction. Last well-formed disc is labeled L5-S1 with right L5 partial sacralization.     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 This begins at L1-L2 where the cord splits into two. L3-L4 demonstrates a septum difficult to discern if fibrous or bony. The tip of the conus conjoined back together at L4. This is a   low-lying conus. Lower thoracic spinal cord demonstrates a prominent central canal or small syrinx measuring 2 mm. Otherwise, no definite abnormal signal conus signal.    Sacrum is not visualized inferior to the S2 segment. Mid to inferior sacrum may be dysmorphic or hypoplastic. Sacral agenesis could be possible. A subtle closed spinal dysraphic defect within the sacrum could be present.    L1 vertebral body mild to moderate chronic compression. Remaining vertebral body heights within normal limits.  No concerning bone marrow lesions.  Unremarkable visualized bony pelvis.      Presacral edema, nonspecific.    Multilevel degenerative disc disease. Multilevel facet arthropathy. Irregularity spinous processes Baastrup's disease. No interspinous bursitis. Bilateral SI joints are severely irregular may reflect severe degenerative joint disease or seronegative   sacroiliitis.    L1-L2 disc spaces desiccated with mild patchy increased STIR signal likely developing degenerative fluid cleft. L1-L2 plates demonstrate bone marrow edema likely  degenerative type I Modic changes. Early manifestation of discitis osteomyelitis less   likely. Please correlate clinically and with inflammatory labs.    L4-L5 mild grade 1 anterolisthesis measuring 1 mm.  L5-S1 mild grade 1 anterolisthesis measuring 2 mm.    T9-T10: Bulge. Small superimposed posterior disc extrusion extending mildly above and below the disc margin. No significant thecal sac stenosis. Mild left foraminal stenosis. Mild to moderate right foraminal stenosis.    T10-T11: Mild bulge. No significant thecal sac stenosis. No significant left foraminal stenosis. Mild to moderate right foraminal stenosis.    T11-T12: Bulge. Small superimposed posterior disc extrusion extending mildly above and below the disc margin. Mild thecal sac stenosis. Moderate left foraminal stenosis. No significant right foraminal stenosis.    T12-L1: Large posterior disc osteophyte complex. Facet hypertrophy. Moderate thecal sac stenosis. Severe left foraminal stenosis. No significant right foraminal stenosis.    L1-L2: Bulge. Small superimposed posterior disc extrusion extending mildly above and below the disc margin. No significant thecal sac stenosis. Severe bilateral foraminal stenosis.      L2-L3: Bulge. Small superimposed posterior disc extrusion extending mildly above and below the disc margin. No significant thecal sac stenosis. Mild to moderate left foraminal stenosis. Moderate right foraminal stenosis.     L3-L4: Bulge. Central posterior disc protrusion. No significant thecal sac stenosis. Mild to moderate bilateral foraminal stenosis.    L4-L5: Mild bulge. No significant thecal sac stenosis. No significant left foraminal stenosis. Severe right foraminal stenosis.    L5-S1: Mild bulge. No significant thecal sac stenosis. Moderate to severe left foraminal stenosis. Severe right foraminal stenosis.    OTHER:  Right kidney demonstrates multiple cysts. No specific follow up recommended.      Impression     IMPRESSION:  Transitional lumbosacral junction. Last well-formed disc is labeled L5-S1 with right L5 partial sacralization.     No concerning bone marrow lesions.    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 Conus is low-lying.     Lower thoracic spinal cord demonstrates a prominent central canal or small syrinx described above. MRI postcontrast imaging would be of benefit.    L1 vertebral body mild to moderate chronic compression.     Presacral edema, nonspecific.    Multilevel spondylosis described above.     Bilateral SI joints are severely irregular may reflect severe degenerative joint disease or seronegative sacroiliitis.    No critical thecal sac stenosis.    Multilevel severe neural foraminal stenosis.    Sacrum is not visualized inferior to the S2 segment. Mid to inferior sacrum may be dysmorphic or hypoplastic. Sacral agenesis could be possible. A subtle closed spinal dysraphic defect within the sacrum could be present.     Glucose by meter   Result Value Ref Range    GLUCOSE BY METER POCT 89 70 - 99 mg/dL   Glucose by meter   Result Value Ref Range    GLUCOSE BY METER POCT 96 70 - 99 mg/dL   Glucose by meter   Result Value Ref Range    GLUCOSE BY METER POCT 100 (H) 70 - 99 mg/dL   Glucose by meter   Result Value Ref Range    GLUCOSE BY METER POCT 97 70 - 99 mg/dL

## 2022-05-13 NOTE — PROGRESS NOTES
05/12/22 1500   Quick Adds   Type of Visit Initial PT Evaluation   Living Environment   People in Home child(simon), adult  (adult child has special needs)   Current Living Arrangements house  (single story)   Home Accessibility stairs within home;other (see comments)  (ramp to enter home)   Number of Stairs, Within Home, Primary one  (1 step down into living room)   Stair Railings, Within Home, Primary none   Transportation Anticipated family or friend will provide   Living Environment Comments pt lives in house with adult daughter who has special needs, pt states she is able to provide for her daughter, she has other adult daughter that lives in town   Self-Care   Usual Activity Tolerance fair   Current Activity Tolerance poor   Equipment Currently Used at Home walker, rolling   Fall history within last six months yes   Number of times patient has fallen within last six months 1   Activity/Exercise/Self-Care Comment prior to admittance pt was Mod-I with FWW at home, pt states Ind with ADL's   General Information   Onset of Illness/Injury or Date of Surgery 05/11/22   Referring Physician Carmelita Slaughter MD   Patient/Family Therapy Goals Statement (PT) did not state   Pertinent History of Current Problem (include personal factors and/or comorbidities that impact the POC) Sarah Noble is a 87 year old female with a past medical history of type 2 diabetes, hypertension, CKD stage III, obesity, chronic knee pain and back pain, chronic urinary retention with a indwelling Solorio catheter presents to hospital after fall for knee pain   Weight-Bearing Status - LLE other (see comments)  (orders unclear, hold for results of lumbar MRI)   Weight-Bearing Status - RLE other (see comments)  (orders unclear, hold for results of Lumbar MRI)   Cognition   Affect/Mental Status (Cognition) WFL;anxious   Orientation Status (Cognition) oriented x 4   Follows Commands (Cognition) WNL   Pain Assessment   Patient Currently  in Pain   (8/10 Left Knee pain, mild pain in Right knee)   Integumentary/Edema   Integumentary/Edema Comments bilat LE knee's, ankles, and feet are edematous   Posture    Posture Forward head position;Protracted shoulders   Range of Motion (ROM)   Range of Motion ROM deficits secondary to pain   ROM Comment deficits with bilat knees L>R due to pain   Strength (Manual Muscle Testing)   Strength (Manual Muscle Testing) Deficits observed during functional mobility   Strength Comments diminishd strength bilat LE's noted during mobility, did not perform MMT due to pt complaints of pain in LE's   Bed Mobility   Comment, (Bed Mobility) supine<>sit with HOB elevated ModA x 1   Transfers   Comment, (Transfers) unable to perform due to pain   Gait/Stairs (Locomotion)   Comment, (Gait/Stairs) did not perform   Balance   Balance Comments sitting EOB unsupported   Sensory Examination   Sensory Perception Comments diminished sensation Right foot on plantar and dorsal surfaces, no obvious radicular pattern   Coordination   Coordination Comments difdiculty with coordination in bilat LE's due to pain   Clinical Impression   Criteria for Skilled Therapeutic Intervention Yes, treatment indicated   PT Diagnosis (PT) impaired mobility   Influenced by the following impairments pain, deficits with functional ROM, strength   Functional limitations due to impairments reduced activity tolerance, reduced Ind with functional transfers, mobility, and ADL's   Clinical Presentation (PT Evaluation Complexity) Evolving/Changing   Clinical Presentation Rationale PMH and clinical judgement   Clinical Decision Making (Complexity) moderate complexity   Planned Therapy Interventions (PT) balance training;bed mobility training;cryotherapy;gait training;home exercise program;patient/family education;ROM (range of motion);stair training;strengthening;stretching;transfer training;progressive activity/exercise   Anticipated Equipment Needs at Discharge (PT)    (pt has FWW)   Risk & Benefits of therapy have been explained evaluation/treatment results reviewed;care plan/treatment goals reviewed;risks/benefits reviewed;participants voiced agreement with care plan;current/potential barriers reviewed;participants included;patient   PT Discharge Planning   PT Discharge Recommendation (DC Rec) Transitional Care Facility   PT Rationale for DC Rec Pt is currentlly well below baseline, unable to mobilize with transfers or ambulate at this time due to high levels of pain in knees L>R, would need lift to tranfer. Pt is ModA x 1 for supine<>sit but dependent on transfer sheet for repositioning in bed. Pt lives with dependent adult child who is being taken care of by other daughter right now. Recommend TCU to progress pt ind and safety with mobility prior to returning home   PT Brief overview of current status bed mobility ModAx 1, unable to transfer due to pain and would need lift   Total Evaluation Time   Total Evaluation Time (Minutes) 12   Physical Therapy Goals   PT Frequency 5x/week   PT Predicted Duration/Target Date for Goal Attainment 05/17/22   PT Goals Bed Mobility;Transfers;Gait;Stairs   PT: Bed Mobility Modified independent;Supine to/from sit;Bridging;Rolling;Within precautions   PT: Transfers Supervision/stand-by assist;Sit to/from stand;Assistive device;Within precautions   PT: Gait Supervision/stand-by assist;Rolling walker;Within precautions;25 feet   PT: Stairs Minimal assist;1 stair;Within precautions

## 2022-05-13 NOTE — PROGRESS NOTES
[facility]    Hospitalist Progress Note    Date of Service (when I saw the patient): 05/13/2022  Admit date: 5/11/2022    Interval History   Full details of events over last 24 hours outlined below.   Patient continues to note knee pain and mobility is significantly decreased. TCU  is recommended.   Patient states pain is noted when she is up and walking and in the anterior knees, L > R. It started a year ago but has gotten much worse. She can not give me clear time frame regarding worsening. No clear trauma.     MRI 5/12/22    Transitional lumbosacral junction. Last well-formed disc is labeled L5-S1 with right L5 partial sacralization.     No concerning bone marrow lesions.     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 Conus is low-lying.      Lower thoracic spinal cord demonstrates a prominent central canal or small syrinx described above. MRI postcontrast imaging would be of benefit.     L1 vertebral body mild to moderate chronic compression.      Presacral edema, nonspecific.     Multilevel spondylosis described above.      Bilateral SI joints are severely irregular may reflect severe degenerative joint disease or seronegative sacroiliitis.     No critical thecal sac stenosis.     Multilevel severe neural foraminal stenosis.     Sacrum is not visualized inferior to the S2 segment. Mid to inferior sacrum may be dysmorphic or hypoplastic. Sacral agenesis could be possible. A subtle closed spinal dysraphic defect within the sacrum could be present.    Assessment & Plan   Bilateral knee pain, L > R   Abnormal MRI of the lumbar spine  Patient with chronic bilateral knee pain. Had right knee pain at home now with worsening left knee pain. Has difficulty ambulating at baseline and uses a walker.  Patient underwent bilateral knee replacements approximately 20 years ago. The patient has a history of chronic pain for which she has used opiates in the past,  but has been managing with non-opiate medications for the last year, will continue to try to avoid opiates.       Appreciate ortho consult. Ordered MRI as above. Appreciate their input re: interpretation of these results and correlation with knee pain. On exam, pain seems to be localized to knees and worse with movement. No back pain or radicular type pain reported. Does okay with straight leg raise and able to lift each leg off the bed and hold for several seconds. Pain induced by flexion and extension of knee L > R. No warmth or overlying skin changes. I am unable to detect clear synovitis due to body habitus.     Ortho has ordered CT of the knees.     Requires TCU at discharge, as unable to mobilize.     Voltaren gel to bilateral knees, Tylenol as needed     Elevated CK  Could be due to covid 19 or due to her sitting for 3 hours prior to her fall.   CKD stage III Baseline Cr 1.2. Mild elevation.  HTN    AM CK and BMP still pending at 3:51 PM on 05/13/22. Discussed with RN.     Holding PTA bumex    Continue PTA amlodipine, lisinopril.     Covid 19 positive, ASYMPTOMATIC  Asymptomatic. Vaccinated. No indication for intervention.     Covid 19 precautions     Type 2 diabetes  Diet-controlled. Well controlled. Last a1c was 6.8%.   Recent Labs   Lab 05/13/22  0638 05/13/22  0157 05/12/22  2221 05/12/22  1718 05/12/22  1220 05/12/22  0927   * 96 89 88 105* 140*     MILLIE  Does not use CPAP     Chronic Urinary retention  Chronic Solorio Catheter    Patient has shown some poor short-term memory but otherwise, cooperative, following commands, and answers questions appropriately. Oriented to place and situation.     Family asked if we should get a UA to evaluate for UTI. No other signs of infection. Would hold on this, as UA will likely be abnormal and grow bacteria.      Normocytic anemia Chronic, stable.  Patient denies any bleeding.      Recent Labs   Lab 05/12/22  0154   HGB 11.6*       Diet: Orders Placed This  Encounter      Moderate Consistent Carb (60 g CHO per Meal) Diet     IVF: Holding.   Solorio Catheter: PRESENT, indication:       DVT Prophylaxis: Enoxaparin (Lovenox) subcutaneous started 05/13/22, not very mobile. COVID +   Code Status: Full Code     Disposition: Expected discharge TBD, once placement found and ortho evaluation complete.   Communication: Discussed with patient, RN on 05/13/22. Called daughter Josefa and left a message.     Jessica Christine MD  Hospitalist Service  [facility]  Securely message with the Vocera Web Console (learn more here)  Text page via [employer] Paging/Directory    Total time spent:  > 35 minutes  Time spent counseling, coordination of care: 25 minutes including discussion with care team and RN, personal review and interpretation of labs and studies as noted above  Developed fever which added to evaluation and mgt.     -Data reviewed today: I reviewed all new labs and imaging results over the last 24 hours. I personally reviewed no images or EKG's today.    Physical Exam   Temp: 98.3  F (36.8  C) Temp src: Oral BP: 118/62 Pulse: 75   Resp: 18 SpO2: 95 % O2 Device: None (Room air)    Vitals:    05/12/22 1700   Weight: 98.5 kg (217 lb 2.5 oz)     Vital Signs with Ranges  Temp:  [98.1  F (36.7  C)-99.2  F (37.3  C)] 98.3  F (36.8  C)  Pulse:  [68-82] 75  Resp:  [16-18] 18  BP: (118-165)/() 118/62  SpO2:  [95 %-97 %] 95 %  I/O last 3 completed shifts:  In: 550 [P.O.:550]  Out: 2300 [Urine:2300]    Today's Exam  Constitutional:  NAD,   Neuropsyche:  alert and oriented to situation and place, answers questions appropriately. Noted to have poor short-term memory.   Respiratory:  Breathing comfortably, good air exchange, no wheezes, no crackles.   Cardiovascular: Regular rate and rhythm, no edema.  GI:  soft, NT/ND, BS normal  Skin/Integumen: No acute rash or sign of bleeding.   MSK: Obese, see above.     Medications   All medications reviewed on 05/13/22        acetaminophen  1,000 mg Oral At Bedtime     amLODIPine  2.5 mg Oral Daily     [Held by provider] bumetanide  0.5 mg Oral Daily     clopidogrel  75 mg Oral Daily     diclofenac  2 g Topical 4x Daily     insulin aspart  1-3 Units Subcutaneous TID AC     insulin aspart  1-3 Units Subcutaneous At Bedtime     lisinopril  20 mg Oral Daily     sodium chloride (PF)  3 mL Intracatheter Q8H       Data   Recent Labs   Lab 05/13/22  0638 05/13/22  0157 05/12/22  2221 05/12/22  0927 05/12/22  0154   WBC  --   --   --   --  8.4   HGB  --   --   --   --  11.6*   MCV  --   --   --   --  94   PLT  --   --   --   --  211   NA  --   --   --   --  135   POTASSIUM  --   --   --   --  4.1   CHLORIDE  --   --   --   --  103   CO2  --   --   --   --  26   BUN  --   --   --   --  28   CR  --   --   --   --  1.22*   ANIONGAP  --   --   --   --  6   JN  --   --   --   --  9.3   * 96 89   < > 180*    < > = values in this interval not displayed.       Recent Results (from the past 24 hour(s))   MR Lumbar Spine w/o Contrast    Narrative    EXAM: MR LUMBAR SPINE W/O CONTRAST  LOCATION: [facility]  DATE/TIME: 5/12/2022 6:53 PM    INDICATION: Unknown primary malignancy, bone eval. Back pain.  COMPARISON: None.  TECHNIQUE: Routine Lumbar Spine MRI without IV contrast.    FINDINGS:   Transitional lumbosacral junction. Last well-formed disc is labeled L5-S1 with right L5 partial sacralization.     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 This begins at L1-L2 where the cord splits into two. L3-L4 demonstrates a septum difficult to discern if fibrous or bony. The tip of the conus conjoined back together at L4. This is a   low-lying conus. Lower thoracic spinal cord demonstrates a prominent central canal or small syrinx measuring 2 mm. Otherwise, no definite abnormal signal conus signal.    Sacrum is not visualized inferior to the S2 segment. Mid to inferior sacrum may be dysmorphic or  hypoplastic. Sacral agenesis could be possible. A subtle closed spinal dysraphic defect within the sacrum could be present.    L1 vertebral body mild to moderate chronic compression. Remaining vertebral body heights within normal limits.  No concerning bone marrow lesions.  Unremarkable visualized bony pelvis.      Presacral edema, nonspecific.    Multilevel degenerative disc disease. Multilevel facet arthropathy. Irregularity spinous processes Baastrup's disease. No interspinous bursitis. Bilateral SI joints are severely irregular may reflect severe degenerative joint disease or seronegative   sacroiliitis.    L1-L2 disc spaces desiccated with mild patchy increased STIR signal likely developing degenerative fluid cleft. L1-L2 plates demonstrate bone marrow edema likely degenerative type I Modic changes. Early manifestation of discitis osteomyelitis less   likely. Please correlate clinically and with inflammatory labs.    L4-L5 mild grade 1 anterolisthesis measuring 1 mm.  L5-S1 mild grade 1 anterolisthesis measuring 2 mm.    T9-T10: Bulge. Small superimposed posterior disc extrusion extending mildly above and below the disc margin. No significant thecal sac stenosis. Mild left foraminal stenosis. Mild to moderate right foraminal stenosis.    T10-T11: Mild bulge. No significant thecal sac stenosis. No significant left foraminal stenosis. Mild to moderate right foraminal stenosis.    T11-T12: Bulge. Small superimposed posterior disc extrusion extending mildly above and below the disc margin. Mild thecal sac stenosis. Moderate left foraminal stenosis. No significant right foraminal stenosis.    T12-L1: Large posterior disc osteophyte complex. Facet hypertrophy. Moderate thecal sac stenosis. Severe left foraminal stenosis. No significant right foraminal stenosis.    L1-L2: Bulge. Small superimposed posterior disc extrusion extending mildly above and below the disc margin. No significant thecal sac stenosis. Severe  bilateral foraminal stenosis.      L2-L3: Bulge. Small superimposed posterior disc extrusion extending mildly above and below the disc margin. No significant thecal sac stenosis. Mild to moderate left foraminal stenosis. Moderate right foraminal stenosis.     L3-L4: Bulge. Central posterior disc protrusion. No significant thecal sac stenosis. Mild to moderate bilateral foraminal stenosis.    L4-L5: Mild bulge. No significant thecal sac stenosis. No significant left foraminal stenosis. Severe right foraminal stenosis.    L5-S1: Mild bulge. No significant thecal sac stenosis. Moderate to severe left foraminal stenosis. Severe right foraminal stenosis.    OTHER:  Right kidney demonstrates multiple cysts. No specific follow up recommended.      Impression    IMPRESSION:  Transitional lumbosacral junction. Last well-formed disc is labeled L5-S1 with right L5 partial sacralization.     No concerning bone marrow lesions.    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 Conus is low-lying.     Lower thoracic spinal cord demonstrates a prominent central canal or small syrinx described above. MRI postcontrast imaging would be of benefit.    L1 vertebral body mild to moderate chronic compression.     Presacral edema, nonspecific.    Multilevel spondylosis described above.     Bilateral SI joints are severely irregular may reflect severe degenerative joint disease or seronegative sacroiliitis.    No critical thecal sac stenosis.    Multilevel severe neural foraminal stenosis.    Sacrum is not visualized inferior to the S2 segment. Mid to inferior sacrum may be dysmorphic or hypoplastic. Sacral agenesis could be possible. A subtle closed spinal dysraphic defect within the sacrum could be present.

## 2022-05-13 NOTE — PROGRESS NOTES
Pt is alert and oriented x3, disoriented to time. VSS. Pt has occasional cough, nonproductive. Denies pain. Solorio in place, having good output. Continue POC.

## 2022-05-13 NOTE — PLAN OF CARE
Called re: Fever of 100.5, VSS otherwise stable, without other evidence of SIRS. WBC this afternoon normal.   Patient was + for COVID at admission.   Has indwelling catheter.     1. Change catheter. Then collect UA, reflext to UCx if positive. \  2. CXR  3. Blood Cx x 2.     Hold on antibiotics.   Change to inpatient status.       Personally reviewed CXR, clear lungs.   UA shows 15 WBC, > 182 RBCs. Questionable UTI.     Reviewed prior UCx results growing E.coli and proteus. Resistant to most beta-lactams, FQ, sensitive to meropenem and bactrim.       Start meropenem (renally dosed) now and follow clinically. Shorten course if fever resolves and no further sign of infection.     Urine culture sent.

## 2022-05-13 NOTE — UTILIZATION REVIEW
Admission Status; Secondary Review Determination       Under the authority of the Utilization Management Committee, the utilization review process indicated a secondary review on the above patient. The review outcome is based on review of the medical records, discussions with staff, and applying clinical experience noted on the date of the review.     (x) Inpatient Status Appropriate - This patient's medical care is consistent with medical management for inpatient care and reasonable inpatient medical practice.     RATIONALE FOR DETERMINATION   87-year-old woman admitted to the hospital after a fall, initially on observation, patient was found to have COVID-19 infection which would complicate her need for rehabilitation placement also was having fever this afternoon work-up was initiated including urine analysis, cultures blood x2, chest x-ray.  Clearly requiring ongoing hospital care would be at significant risk of poor outcome if discharged prior to 2 midnights.  Dr. Christine notified  The expected length of stay at the time of admission was more than 2 nights because of the severity of illness, intensity of service provided, and risk for adverse outcome. Inpatient admission is appropriate.         This document was produced using voice recognition software       The information on this document is developed by the utilization review team in order for the business office to ensure compliance. This only denotes the appropriateness of proper admission status and does not reflect the quality of care rendered.   The definitions of Inpatient Status and Observation Status used in making the determination above are those provided in the CMS Coverage Manual, Chapter 1 and Chapter 6, section 70.4.   Sincerely,   NATALYA VELAZQUEZ MD   System Medical Director   Utilization Management   [facility].

## 2022-05-13 NOTE — CONSULTS
Care Management Initial Consult    General Information  Assessment completed with: Family, Daughter Josefa  Type of CM/SW Visit: Offer D/C Planning    Primary Care Provider verified and updated as needed:     Readmission within the last 30 days:        Reason for Consult: discharge planning  Advance Care Planning:            Communication Assessment  Patient's communication style: spoken language (English or Bilingual)    Hearing Difficulty or Deaf: yes        Cognitive  Cognitive/Neuro/Behavioral: WDL                      Living Environment:   People in home: child(simon), adult     Current living Arrangements:        Able to return to prior arrangements: yes       Family/Social Support:  Care provided by: homecare agency, child(simon)  Provides care for: child(simon)  Marital Status:              Description of Support System:           Current Resources:   Patient receiving home care services: Yes  Skilled Home Care Services: Skilled Nursing, Home Health Aid  Community Resources:    Equipment currently used at home: walker, rolling  Supplies currently used at home:      Employment/Financial:  Employment Status:          Financial Concerns:             Lifestyle & Psychosocial Needs:  Social Determinants of Health     Tobacco Use: Low Risk      Smoking Tobacco Use: Never Smoker     Smokeless Tobacco Use: Never Used   Alcohol Use: Not on file   Financial Resource Strain: Not on file   Food Insecurity: Not on file   Transportation Needs: Not on file   Physical Activity: Not on file   Stress: Not on file   Social Connections: Not on file   Intimate Partner Violence: Not on file   Depression: Not on file   Housing Stability: Not on file       Functional Status:  Prior to admission patient needed assistance:              Mental Health Status:          Chemical Dependency Status:                Values/Beliefs:  Spiritual, Cultural Beliefs, Worship Practices, Values that affect care:                 Additional  Information:  Called to Daughter Josefa to discuss Observation status/[place] and role in discharge planning.  Pt lives with daughter (who has special needs) and other daughter (Josefa) lives nearby.  Pt indep at baseline.    Is open to Home RN/HHA through [facility].  Home RN comes twice/month to check/change Solorio, HHA comes 3x week for bathing/shower assist.   Reviewed recommendation of TCU due to current mobility and even requiring a lift.   Daughter feels they could not accommodate lift in the home.   Pt has been at [facility] before but understands with current COVID status is limited in options. Only COVID facility is [facility].  Daughter would prefer patient come [facility] with [facility] (adding PT/OT) but understands that may not be an option with current mobility.     SW updated.    CC/SW to follow for discharge to TCU vs Home w/ HHC if improves mobility.     Comfort Quick, RN

## 2022-05-13 NOTE — PLAN OF CARE
Goal Outcome Evaluation:  A&O x 3 forgetful , febrile with elevated bp, pain controlled with repo, CMS intact, up with assist of lift, voiding adequately in patent joseph, IV SL, joseph catheter changed, bola area redness noted and inter dry placed and mepliex placed on coccyx, continue to monitor.

## 2022-05-14 ENCOUNTER — APPOINTMENT (OUTPATIENT)
Dept: GENERAL RADIOLOGY | Facility: CLINIC | Age: 87
DRG: 555 | End: 2022-05-14
Attending: INTERNAL MEDICINE
Payer: MEDICARE

## 2022-05-14 ENCOUNTER — APPOINTMENT (OUTPATIENT)
Dept: OCCUPATIONAL THERAPY | Facility: CLINIC | Age: 87
DRG: 555 | End: 2022-05-14
Attending: HOSPITALIST
Payer: MEDICARE

## 2022-05-14 LAB
ALBUMIN SERPL-MCNC: 2.7 G/DL (ref 3.4–5)
ALP SERPL-CCNC: 48 U/L (ref 40–150)
ALT SERPL W P-5'-P-CCNC: 23 U/L (ref 0–50)
ANION GAP SERPL CALCULATED.3IONS-SCNC: 6 MMOL/L (ref 3–14)
AST SERPL W P-5'-P-CCNC: 47 U/L (ref 0–45)
BASOPHILS # BLD AUTO: 0 10E3/UL (ref 0–0.2)
BASOPHILS NFR BLD AUTO: 0 %
BILIRUB DIRECT SERPL-MCNC: 0.1 MG/DL (ref 0–0.2)
BILIRUB SERPL-MCNC: 0.6 MG/DL (ref 0.2–1.3)
BUN SERPL-MCNC: 25 MG/DL (ref 7–30)
CALCIUM SERPL-MCNC: 8.5 MG/DL (ref 8.5–10.1)
CHLORIDE BLD-SCNC: 103 MMOL/L (ref 94–109)
CO2 SERPL-SCNC: 24 MMOL/L (ref 20–32)
CREAT SERPL-MCNC: 1.14 MG/DL (ref 0.52–1.04)
EOSINOPHIL # BLD AUTO: 0 10E3/UL (ref 0–0.7)
EOSINOPHIL NFR BLD AUTO: 0 %
ERYTHROCYTE [DISTWIDTH] IN BLOOD BY AUTOMATED COUNT: 12.5 % (ref 10–15)
GFR SERPL CREATININE-BSD FRML MDRD: 46 ML/MIN/1.73M2
GLUCOSE BLD-MCNC: 108 MG/DL (ref 70–99)
GLUCOSE BLDC GLUCOMTR-MCNC: 104 MG/DL (ref 70–99)
GLUCOSE BLDC GLUCOMTR-MCNC: 121 MG/DL (ref 70–99)
GLUCOSE BLDC GLUCOMTR-MCNC: 210 MG/DL (ref 70–99)
GLUCOSE BLDC GLUCOMTR-MCNC: 94 MG/DL (ref 70–99)
HCT VFR BLD AUTO: 33.8 % (ref 35–47)
HGB BLD-MCNC: 11 G/DL (ref 11.7–15.7)
IMM GRANULOCYTES # BLD: 0 10E3/UL
IMM GRANULOCYTES NFR BLD: 1 %
LYMPHOCYTES # BLD AUTO: 0.7 10E3/UL (ref 0.8–5.3)
LYMPHOCYTES NFR BLD AUTO: 13 %
MCH RBC QN AUTO: 30 PG (ref 26.5–33)
MCHC RBC AUTO-ENTMCNC: 32.5 G/DL (ref 31.5–36.5)
MCV RBC AUTO: 92 FL (ref 78–100)
MONOCYTES # BLD AUTO: 0.7 10E3/UL (ref 0–1.3)
MONOCYTES NFR BLD AUTO: 13 %
NEUTROPHILS # BLD AUTO: 4 10E3/UL (ref 1.6–8.3)
NEUTROPHILS NFR BLD AUTO: 73 %
NRBC # BLD AUTO: 0 10E3/UL
NRBC BLD AUTO-RTO: 0 /100
PLATELET # BLD AUTO: 155 10E3/UL (ref 150–450)
POTASSIUM BLD-SCNC: 3.8 MMOL/L (ref 3.4–5.3)
PROT SERPL-MCNC: 6.4 G/DL (ref 6.8–8.8)
RBC # BLD AUTO: 3.67 10E6/UL (ref 3.8–5.2)
SODIUM SERPL-SCNC: 133 MMOL/L (ref 133–144)
WBC # BLD AUTO: 5.4 10E3/UL (ref 4–11)

## 2022-05-14 PROCEDURE — 250N000011 HC RX IP 250 OP 636: Performed by: INTERNAL MEDICINE

## 2022-05-14 PROCEDURE — 250N000013 HC RX MED GY IP 250 OP 250 PS 637: Performed by: HOSPITALIST

## 2022-05-14 PROCEDURE — 82248 BILIRUBIN DIRECT: CPT | Performed by: INTERNAL MEDICINE

## 2022-05-14 PROCEDURE — 80053 COMPREHEN METABOLIC PANEL: CPT | Performed by: INTERNAL MEDICINE

## 2022-05-14 PROCEDURE — 120N000001 HC R&B MED SURG/OB

## 2022-05-14 PROCEDURE — 250N000013 HC RX MED GY IP 250 OP 250 PS 637: Performed by: INTERNAL MEDICINE

## 2022-05-14 PROCEDURE — 97165 OT EVAL LOW COMPLEX 30 MIN: CPT | Mod: GO

## 2022-05-14 PROCEDURE — 258N000003 HC RX IP 258 OP 636: Performed by: INTERNAL MEDICINE

## 2022-05-14 PROCEDURE — 71045 X-RAY EXAM CHEST 1 VIEW: CPT

## 2022-05-14 PROCEDURE — 36415 COLL VENOUS BLD VENIPUNCTURE: CPT | Performed by: INTERNAL MEDICINE

## 2022-05-14 PROCEDURE — 99232 SBSQ HOSP IP/OBS MODERATE 35: CPT | Performed by: INTERNAL MEDICINE

## 2022-05-14 PROCEDURE — 97530 THERAPEUTIC ACTIVITIES: CPT | Mod: GO

## 2022-05-14 PROCEDURE — 85025 COMPLETE CBC W/AUTO DIFF WBC: CPT | Performed by: INTERNAL MEDICINE

## 2022-05-14 PROCEDURE — XW043E5 INTRODUCTION OF REMDESIVIR ANTI-INFECTIVE INTO CENTRAL VEIN, PERCUTANEOUS APPROACH, NEW TECHNOLOGY GROUP 5: ICD-10-PCS | Performed by: INTERNAL MEDICINE

## 2022-05-14 RX ORDER — ALBUTEROL SULFATE 90 UG/1
2 AEROSOL, METERED RESPIRATORY (INHALATION) EVERY 4 HOURS PRN
Status: DISCONTINUED | OUTPATIENT
Start: 2022-05-14 | End: 2022-05-23 | Stop reason: HOSPADM

## 2022-05-14 RX ORDER — LIDOCAINE 40 MG/G
CREAM TOPICAL
Status: DISCONTINUED | OUTPATIENT
Start: 2022-05-14 | End: 2022-05-23 | Stop reason: HOSPADM

## 2022-05-14 RX ORDER — BENZONATATE 100 MG/1
100 CAPSULE ORAL 3 TIMES DAILY PRN
Status: DISCONTINUED | OUTPATIENT
Start: 2022-05-14 | End: 2022-05-23 | Stop reason: HOSPADM

## 2022-05-14 RX ADMIN — SODIUM CHLORIDE 50 ML: 9 INJECTION, SOLUTION INTRAVENOUS at 20:34

## 2022-05-14 RX ADMIN — GUAIFENESIN 10 ML: 200 SOLUTION ORAL at 20:17

## 2022-05-14 RX ADMIN — DICLOFENAC SODIUM 2 G: 10 GEL TOPICAL at 18:05

## 2022-05-14 RX ADMIN — REMDESIVIR 200 MG: 100 INJECTION, POWDER, LYOPHILIZED, FOR SOLUTION INTRAVENOUS at 20:23

## 2022-05-14 RX ADMIN — MICONAZOLE NITRATE: 20 POWDER TOPICAL at 20:22

## 2022-05-14 RX ADMIN — ACETAMINOPHEN 650 MG: 325 TABLET ORAL at 20:17

## 2022-05-14 RX ADMIN — CLOPIDOGREL BISULFATE 75 MG: 75 TABLET ORAL at 09:46

## 2022-05-14 RX ADMIN — BENZONATATE 100 MG: 100 CAPSULE ORAL at 20:17

## 2022-05-14 RX ADMIN — LISINOPRIL 20 MG: 20 TABLET ORAL at 09:46

## 2022-05-14 RX ADMIN — DICLOFENAC SODIUM 2 G: 10 GEL TOPICAL at 22:24

## 2022-05-14 RX ADMIN — MEROPENEM 1 G: 1 INJECTION, POWDER, FOR SOLUTION INTRAVENOUS at 22:24

## 2022-05-14 RX ADMIN — ENOXAPARIN SODIUM 40 MG: 40 INJECTION SUBCUTANEOUS at 18:05

## 2022-05-14 RX ADMIN — DICLOFENAC SODIUM 2 G: 10 GEL TOPICAL at 09:46

## 2022-05-14 RX ADMIN — AMLODIPINE BESYLATE 2.5 MG: 2.5 TABLET ORAL at 09:46

## 2022-05-14 RX ADMIN — MEROPENEM 1 G: 1 INJECTION, POWDER, FOR SOLUTION INTRAVENOUS at 10:11

## 2022-05-14 ASSESSMENT — ACTIVITIES OF DAILY LIVING (ADL)
ADLS_ACUITY_SCORE: 40
ADLS_ACUITY_SCORE: 44
ADLS_ACUITY_SCORE: 40
PREVIOUS_RESPONSIBILITIES: MEAL PREP;HOUSEKEEPING;LAUNDRY;SHOPPING;DRIVING
ADLS_ACUITY_SCORE: 40

## 2022-05-14 NOTE — PROGRESS NOTES
[facility]    Hospitalist Progress Note    Date of Service (when I saw the patient): 05/14/2022  Admit date: 5/11/2022    Interval History     Full details of events over last 24 hours outlined below.   Underwent CT, see below  Fever overnight, see below  Now developing cough and wheezing, and some mild SOB.      Assessment & Plan     Bilateral knee pain, L > R   Abnormal MRI of the lumbar spine  Patient with chronic bilateral knee pain. Had right knee pain at home now with worsening left knee pain. Has difficulty ambulating at baseline and uses a walker.  Patient underwent bilateral knee replacements approximately 20 years ago. The patient has a history of chronic pain for which she has used opiates in the past, but has been managing with non-opiate medications for the last year, will continue to try to avoid opiates.   * MRI 5/12/22 Transitional lumbosacral junction. Last well-formed disc is labeled L5-S1 with right L5 partial sacralization. No concerning bone marrow lesions. 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 Conus is low-lying.  Lower thoracic spinal cord demonstrates a prominent central canal or small syrinx described above. MRI postcontrast imaging would be of benefit. L1 vertebral body mild to moderate chronic compression. Presacral edema, nonspecific. Multilevel spondylosis described above. Bilateral SI joints are severely irregular may reflect severe degenerative joint disease or seronegative sacroiliitis. No critical thecal sac stenosis. Multilevel severe neural foraminal stenosis.  * CT L knee 5/13/22: No evidence of fracture or TKA loosening. Mild soft tissue swelling superior to the knee. Large cheryl protuberance off the inferior patella, may be residual of prior trauma.    * U/S of the legs on 5/13/22 was limited but negative for DVT. Ordered by ortho for evaluation of knee pain in the context of COVID.     Sacrum is not  "visualized inferior to the S2 segment. Mid to inferior sacrum may be dysmorphic or hypoplastic. Sacral agenesis could be possible. A subtle closed spinal dysraphic defect within the sacrum could be present.        Appreciate ortho consult. Ordered MRI as above. Appreciate their input re: interpretation of these results and correlation with knee pain. On exam, pain seems to be localized to knees and worse with movement. No back pain or radicular type pain reported. No sensory deficits. Does okay with straight leg raise and able to lift each leg off the bed and hold for several seconds. Pain induced by flexion and extension of knee L > R. No warmth or overlying skin changes. I am unable to detect clear synovitis due to body habitus.     Per ortho, \"MRI of lumbar spine Iwhile abnormal, does not demonstrate obvious corresponding pathology.\" Agree. No further recommendations regarding abnormalities seen.     CT shows no loosening of prosthesis or occult fracture. There I some soft tissue swelling superior to knee and cheryl prominence of inferior patella. ? If that is causing some of her pain. Appreciate ortho's input.  Any further recs?    Ortho, Is it possible cheryl prominence of patella is what is causing pain walking?     Requires TCU at discharge, as unable to mobilize.      Voltaren gel to bilateral knees, Tylenol as needed     Fever of 100.5 on 5/13/22   Mild confusion, improved on 05/14/22.   Possible CAUTI   COVID + with clear CXR on 5/13/22, development of cough and SOB on 05/14/22  Patient noted to have short-term memory problems when talking to family by phone. They were concerned about UTI. She then developed a low grade fever of 100.5 She is otherwise alert, oriented to place and situation and answers questions appropriately. No cough or breathing troubles.           Recent Labs   Lab 05/13/22  1612 05/12/22  0154   WBC 5.4 8.4   CRP 63.4*  --       * UA showed only 15 WBC, > 182 RBC AFTER cathter change. "   * CXR clear.   * BCx and UCx sent.   * Reviewed prior UCx results growing E.coli and proteus. Resistant to most beta-lactams, FQ, sensitive to meropenem and bactrim.        Given development of respiratory symptoms, cough, wheezing with O2 of 94%, increased risk of progression > start Remdesivir x 3 days.     Combivent inhalers    Guaifenessen for cough.     Start meropenem (renally dosed) on 5/13/22 and follow clinically. Shorten course if fever resolves and no further sign of infection. Awaiting UCx, will give short course given suspicion for UTI low.     On 05/14/22, no further fevers over 100.     Follow UCx and BCx     Elevated CK, resolving   Could be due to covid 19 or due to her sitting for 3 hours prior to her fall.   CKD stage III Baseline Cr 1.2. Mild elevation.  HTN    Resume PTA bumex on 05/14/22    Continue PTA amlodipine, lisinopril.           Recent Labs   Lab 05/13/22  1612 05/12/22  0154   * 652*            Recent Labs   Lab 05/14/22  0823 05/13/22  1612 05/12/22  0154   CR 1.14* 1.21* 1.22*         Covid 19 positive, ASYMPTOMATIC  Asymptomatic. Vaccinated. No indication for intervention.        Covid 19 precautions     Type 2 diabetes  Diet-controlled. Well controlled. Last a1c was 6.8%.            Recent Labs   Lab 05/14/22  0652 05/13/22  2123 05/13/22  1657 05/13/22  1612 05/13/22  1208 05/13/22  0818   GLC 94 115* 113* 131* 120* 97      MILLIE  Does not use CPAP     Chronic Urinary retention  Chronic Solorio Catheter    Patient has shown some poor short-term memory but otherwise, cooperative, following commands, and answers questions appropriately. Oriented to place and situation.     Family asked if we should get a UA to evaluate for UTI. No other signs of infection. Would hold on this, as UA will likely be abnormal and grow bacteria.      Normocytic anemia Chronic, stable.  Patient denies any bleeding.             Recent Labs   Lab 05/13/22  1612 05/12/22  0154   HGB 11.1* 11.6*          Diet: Orders Placed This Encounter      Moderate Consistent Carb (60 g CHO per Meal) Diet     IVF: Holding.   Solorio Catheter: PRESENT, indication: Retention     DVT Prophylaxis: Enoxaparin (Lovenox) subcutaneous started 05/13/22, not very mobile. COVID +   Code Status: Full Code      Disposition: Expected discharge TBD, once placement found and ortho evaluation complete.   Communication: Discussed with patient, RN on 05/13/22. Called daughter Josefa and left a message.       Jessica Christine MD  Hospitalist Service  [facility]  Securely message with the Vocera Web Console (learn more here)  Text page via [employer] Paging/Directory    -Data reviewed today: I reviewed all new labs and imaging results over the last 24 hours. I personally reviewed no images or EKG's today.    Physical Exam   Temp: 99.1  F (37.3  C) Temp src: Oral BP: (!) 147/78 Pulse: 78   Resp: 16 SpO2: 97 % O2 Device: None (Room air)    Vitals:    05/12/22 1700   Weight: 98.5 kg (217 lb 2.5 oz)     Vital Signs with Ranges  Temp:  [98  F (36.7  C)-100.5  F (38.1  C)] 99.1  F (37.3  C)  Pulse:  [72-83] 78  Resp:  [16-18] 16  BP: (127-165)/(71-88) 147/78  SpO2:  [94 %-97 %] 97 %  I/O last 3 completed shifts:  In: 300 [P.O.:300]  Out: 1250 [Urine:1250]    Today's Exam  Constitutional:  NAD,   Neuropsyche:  alert and oriented to situation and place, answers questions appropriately. Noted to have poor short-term memory.   Respiratory: labored breathing with diffuse wheezes.    Cardiovascular: Regular rate and rhythm, no edema.  GI:  soft, NT/ND, BS normal  Skin/Integumen: No acute rash or sign of bleeding.   MSK: Obese, see above.     Medications   All medications reviewed on 05/14/22      remdesivir  100 mg Intravenous Q24H    And     sodium chloride 0.9%  50 mL Intravenous Q24H     acetaminophen  1,000 mg Oral At Bedtime     amLODIPine  2.5 mg Oral Daily     bumetanide  0.5 mg Oral Daily     clopidogrel  75 mg Oral Daily      diclofenac  2 g Topical 4x Daily     enoxaparin ANTICOAGULANT  40 mg Subcutaneous Q24H     insulin aspart  1-3 Units Subcutaneous TID AC     insulin aspart  1-3 Units Subcutaneous At Bedtime     lisinopril  20 mg Oral Daily     miconazole   Topical BID     sodium chloride (PF)  3 mL Intracatheter Q8H     sodium chloride (PF)  3 mL Intracatheter Q8H       Data   Recent Labs   Lab 05/14/22  0652 05/13/22  2123 05/13/22  1657 05/13/22  1612 05/12/22  0927 05/12/22  0154   WBC  --   --   --  5.4  --  8.4   HGB  --   --   --  11.1*  --  11.6*   MCV  --   --   --  95  --  94   PLT  --   --   --  162  --  211   NA  --   --   --  135  --  135   POTASSIUM  --   --   --  4.0  --  4.1   CHLORIDE  --   --   --  104  --  103   CO2  --   --   --  26  --  26   BUN  --   --   --  27  --  28   CR  --   --   --  1.21*  --  1.22*   ANIONGAP  --   --   --  5  --  6   JN  --   --   --  9.0  --  9.3   GLC 94 115* 113* 131*   < > 180*    < > = values in this interval not displayed.       Recent Results (from the past 24 hour(s))   CT Knee Left w/o Contrast    Narrative    CT LEFT KNEE WITHOUT AND WITH CONTRAST  5/13/2022 12:30 PM     HISTORY: Bilateral total knee arthroplasties. Worsening left knee  pain.    TECHNIQUE: Axial scans were performed through the left knee with  sagittal and coronal reconstruction. Radiation dose for this scan was  reduced using automated exposure control, adjustment of the mA and/or  kV according to patient size, or iterative reconstruction technique.    COMPARISON: X-ray from yesterday.    FINDINGS: Long stem femoral and tibial component total knee  arthroplasty. No evidence of acute fracture or loosening. Large bony  protuberance off the inferior patella may be residua from prior trauma  or surgery. Old healed proximal fibular diaphyseal fracture.    Diffuse muscle atrophy. Mild soft tissue swelling just superior to the  knee.      Impression    IMPRESSION:  1. No evidence of acute fracture or  loosening.  2. Mild soft tissue swelling just superior to the knee.    NISSA STOCKTON MD         SYSTEM ID:  [id number]   XR Chest Port 1 View    Narrative    EXAM: XR CHEST PORT 1 VIEW  LOCATION: [facility]  DATE/TIME: 5/13/2022 4:32 PM    INDICATION: Confirmed COVID-19. Fever.  COMPARISON: None.      Impression    IMPRESSION:     No focal airspace disease. No pleural effusion or pneumothorax.    The cardiomediastinal silhouette is unremarkable.   US Lower Extremity Venous Duplex Bilateral    Narrative    EXAM: US LOWER EXTREMITY VENOUS DUPLEX BILATERAL  LOCATION: [facility]  DATE/TIME: 5/13/2022 10:41 PM    INDICATION: L>R knee pain, nonspecific. COVID+. Rule out DVT.  Please include iliacs if able.  COMPARISON: None.  TECHNIQUE: Venous Duplex ultrasound of bilateral lower extremities with and without compression, augmentation and duplex. Color flow and spectral Doppler with waveform analysis performed.    FINDINGS: Exam includes the common femoral, femoral, popliteal veins as well as segmentally visualized deep calf veins and greater saphenous vein. Given patient's body habitus and inability for significant compression, there is limited visualization   involving the distal femoral veins and the calf veins.    RIGHT: No deep vein thrombosis. No superficial thrombophlebitis. No popliteal cyst.    LEFT: No deep vein thrombosis. No superficial thrombophlebitis. No popliteal cyst.      Impression    IMPRESSION:  1.  Given patient's body habitus and inability for significant compression, there is limited visualization involving the distal femoral veins and the calf veins.    2.  Visualized veins of both lower extremities are otherwise normal with no evidence for deep vein thrombosis.

## 2022-05-14 NOTE — PLAN OF CARE
"Goal Outcome Evaluation:    Plan of Care Reviewed With: patient     /71 (BP Location: Right arm)   Pulse 72   Temp 98  F (36.7  C) (Oral)   Resp 16   Ht 1.626 m (5' 4\")   Wt 98.5 kg (217 lb 2.5 oz)   SpO2 95%   BMI 37.27 kg/m      Pt here with Generalized muscle weakness, Acute bilateral knee pain. Oriented to self.  VS elevated on RA. Moderate consistent carb (60 g CHO per Meal) diet, takes pills whole with thin liquids. Covid + precaution maintained, non productive cough occasionally. P IV SL. Incontinent of bladder, joseph in place. Up with A2/lift. Denies pain.US completed this shift. Discharge to TCU pending placement.               "

## 2022-05-14 NOTE — PLAN OF CARE
Goal Outcome Evaluation:  A&O x 3 confused to minal, VSS on RA, pain controlled with topical cream, worsening cough noted, up with assist of 2 and lift, voiding adequately in patent joseph, IV SL, tolerating IV ABX, continue to monitor.

## 2022-05-15 LAB
ANION GAP SERPL CALCULATED.3IONS-SCNC: 7 MMOL/L (ref 3–14)
BACTERIA UR CULT: NORMAL
BUN SERPL-MCNC: 24 MG/DL (ref 7–30)
CALCIUM SERPL-MCNC: 8 MG/DL (ref 8.5–10.1)
CHLORIDE BLD-SCNC: 103 MMOL/L (ref 94–109)
CO2 SERPL-SCNC: 24 MMOL/L (ref 20–32)
CREAT SERPL-MCNC: 1.14 MG/DL (ref 0.52–1.04)
CRP SERPL-MCNC: 101 MG/L (ref 0–8)
D DIMER PPP FEU-MCNC: 1.34 UG/ML FEU (ref 0–0.5)
ERYTHROCYTE [DISTWIDTH] IN BLOOD BY AUTOMATED COUNT: 12.3 % (ref 10–15)
GFR SERPL CREATININE-BSD FRML MDRD: 46 ML/MIN/1.73M2
GLUCOSE BLD-MCNC: 102 MG/DL (ref 70–99)
GLUCOSE BLDC GLUCOMTR-MCNC: 124 MG/DL (ref 70–99)
GLUCOSE BLDC GLUCOMTR-MCNC: 132 MG/DL (ref 70–99)
GLUCOSE BLDC GLUCOMTR-MCNC: 148 MG/DL (ref 70–99)
GLUCOSE BLDC GLUCOMTR-MCNC: 99 MG/DL (ref 70–99)
HCT VFR BLD AUTO: 32.6 % (ref 35–47)
HGB BLD-MCNC: 10.3 G/DL (ref 11.7–15.7)
INR PPP: 1.11 (ref 0.85–1.15)
MCH RBC QN AUTO: 29.6 PG (ref 26.5–33)
MCHC RBC AUTO-ENTMCNC: 31.6 G/DL (ref 31.5–36.5)
MCV RBC AUTO: 94 FL (ref 78–100)
PLATELET # BLD AUTO: 145 10E3/UL (ref 150–450)
POTASSIUM BLD-SCNC: 3.6 MMOL/L (ref 3.4–5.3)
RBC # BLD AUTO: 3.48 10E6/UL (ref 3.8–5.2)
SODIUM SERPL-SCNC: 134 MMOL/L (ref 133–144)
WBC # BLD AUTO: 5.2 10E3/UL (ref 4–11)

## 2022-05-15 PROCEDURE — 120N000001 HC R&B MED SURG/OB

## 2022-05-15 PROCEDURE — 85610 PROTHROMBIN TIME: CPT | Performed by: INTERNAL MEDICINE

## 2022-05-15 PROCEDURE — 250N000013 HC RX MED GY IP 250 OP 250 PS 637: Performed by: HOSPITALIST

## 2022-05-15 PROCEDURE — 36415 COLL VENOUS BLD VENIPUNCTURE: CPT | Performed by: INTERNAL MEDICINE

## 2022-05-15 PROCEDURE — 85014 HEMATOCRIT: CPT | Performed by: INTERNAL MEDICINE

## 2022-05-15 PROCEDURE — 258N000003 HC RX IP 258 OP 636: Performed by: INTERNAL MEDICINE

## 2022-05-15 PROCEDURE — 86140 C-REACTIVE PROTEIN: CPT | Performed by: INTERNAL MEDICINE

## 2022-05-15 PROCEDURE — 85379 FIBRIN DEGRADATION QUANT: CPT | Performed by: INTERNAL MEDICINE

## 2022-05-15 PROCEDURE — 250N000013 HC RX MED GY IP 250 OP 250 PS 637: Performed by: INTERNAL MEDICINE

## 2022-05-15 PROCEDURE — 250N000011 HC RX IP 250 OP 636: Performed by: INTERNAL MEDICINE

## 2022-05-15 PROCEDURE — 80048 BASIC METABOLIC PNL TOTAL CA: CPT | Performed by: INTERNAL MEDICINE

## 2022-05-15 PROCEDURE — 99232 SBSQ HOSP IP/OBS MODERATE 35: CPT | Performed by: INTERNAL MEDICINE

## 2022-05-15 RX ADMIN — BUMETANIDE 0.5 MG: 0.5 TABLET ORAL at 08:07

## 2022-05-15 RX ADMIN — DICLOFENAC SODIUM 2 G: 10 GEL TOPICAL at 08:18

## 2022-05-15 RX ADMIN — GUAIFENESIN 10 ML: 200 SOLUTION ORAL at 00:29

## 2022-05-15 RX ADMIN — LISINOPRIL 20 MG: 20 TABLET ORAL at 08:07

## 2022-05-15 RX ADMIN — MICONAZOLE NITRATE: 20 POWDER TOPICAL at 20:17

## 2022-05-15 RX ADMIN — AMLODIPINE BESYLATE 2.5 MG: 2.5 TABLET ORAL at 08:07

## 2022-05-15 RX ADMIN — MEROPENEM 1 G: 1 INJECTION, POWDER, FOR SOLUTION INTRAVENOUS at 09:13

## 2022-05-15 RX ADMIN — DICLOFENAC SODIUM 2 G: 10 GEL TOPICAL at 21:58

## 2022-05-15 RX ADMIN — GUAIFENESIN 10 ML: 200 SOLUTION ORAL at 04:54

## 2022-05-15 RX ADMIN — BENZONATATE 100 MG: 100 CAPSULE ORAL at 04:54

## 2022-05-15 RX ADMIN — MICONAZOLE NITRATE: 20 POWDER TOPICAL at 08:18

## 2022-05-15 RX ADMIN — ACETAMINOPHEN 1000 MG: 500 TABLET, FILM COATED ORAL at 21:58

## 2022-05-15 RX ADMIN — ACETAMINOPHEN 1000 MG: 500 TABLET, FILM COATED ORAL at 04:54

## 2022-05-15 RX ADMIN — CLOPIDOGREL BISULFATE 75 MG: 75 TABLET ORAL at 08:07

## 2022-05-15 RX ADMIN — SODIUM CHLORIDE 1000 ML: 9 INJECTION, SOLUTION INTRAVENOUS at 17:56

## 2022-05-15 RX ADMIN — DICLOFENAC SODIUM 2 G: 10 GEL TOPICAL at 17:56

## 2022-05-15 RX ADMIN — ENOXAPARIN SODIUM 40 MG: 40 INJECTION SUBCUTANEOUS at 15:03

## 2022-05-15 RX ADMIN — DICLOFENAC SODIUM 2 G: 10 GEL TOPICAL at 15:04

## 2022-05-15 RX ADMIN — REMDESIVIR 100 MG: 100 INJECTION, POWDER, LYOPHILIZED, FOR SOLUTION INTRAVENOUS at 17:55

## 2022-05-15 ASSESSMENT — ACTIVITIES OF DAILY LIVING (ADL)
ADLS_ACUITY_SCORE: 46
ADLS_ACUITY_SCORE: 44

## 2022-05-15 NOTE — PLAN OF CARE
Date/Time 05/15/22 1700    Medical    Diagnosis: COVID + knee pain  POD#:NA  Mental Status:A&Ox3 forgetful and disoriented to time  Activity/dangle repo q 2  Diet:mod carb  Pain: topical cream  Joseph/Voiding:joseph  Tele/Restraints/Iso:special precautions  02/LDA: IV SL  D/C Date:TBD  Other Info: cough unproductive, diarrhea x3

## 2022-05-15 NOTE — PLAN OF CARE
Trauma/Ortho/Medical  Diagnosis:Bilateral knee pain/ covid +  Mental Status:A&O x 3-4 forgetful  Activity: Lift   Diet:Mod carb  Pain: denies  Solorio: draining adequately  Iso: covid precautions  LDA: IV SL  D/C Date:Pending improvement.  Other Info: first dose remdesvir yesterday, tessalon and robitussin given for cough.  @ 0200.

## 2022-05-15 NOTE — PROGRESS NOTES
[facility]    Hospitalist Progress Note    Date of Service (when I saw the patient): 05/15/2022  Admit date: 5/11/2022    Note accidentally ADDENDED (rather than COPIED) note from 5/14 so this note is copied from addended note, and I restored 5/14 note.     Interval History   Full details of events over last 24 hours outlined below.   Fever again last night. O2 saturations 93% on RA. CXR negative for infiltrates.   Started on Remdesivir last night for short 3 day course for mild COVID, high risk of progression.   Discussed with daughter, her sisters are now coming down with COVID too, two recently tested positive.   Patient states her breathing is much better, cough improved.   Notes now she is having loose stools.   No SOB, CP, abdominal pain, N/V.   Knee pain seems better.     Assessment & Plan    Fever of 100.5 on 5/13/22  Mild confusion on 5/13, IMPROVED  Mild COVID.   Doubt CAUTI   * Screening COVD PCR + on admission (5/11) with clear CXR, and no sx of COVID.  Subsequently developed fever on 5/13; cough and SOB on 05/14/22 > started 3 day course of Remdesivir.  * Family concerned about UTI initially due to confusion (no sx of COVID at that time).   * UA showed only 15 WBC, > 182 RBC AFTER cathter change.   * Started meropenem 5/13/22 for fever prior to development of COVID sx. UCx growing mixed genital drew and with only 15 WBC on UA, doubt infection. Stopped meropenem on 05/15/22.    * CXR clear on 5/13 and 5/14.    * BCx NGTD    Recent Labs   Lab 05/15/22  0717 05/14/22  0823 05/13/22  1612 05/12/22  0154   WBC 5.2 5.4 5.4 8.4   .0*  --  63.4*  --    DD 1.34*  --   --   --          Given development of respiratory symptoms, cough, wheezing with O2 of 94%, increased risk of progression > started Remdesivir x 3 days on 5/14/22.     Combivent inhalers    Guaifenessen for cough.     On 05/15/22, Last fever, T of 102.4 on 5/14/22 @ 1920. O2 saturations 93% on RA. Hemodynamically  "stable. Note CRP going up. WBC remains normal. Mild elevation of d-dimer.    Started lovenox on 5/13. Follow d-dimer.     Bilateral knee pain, L > R   Abnormal MRI of the lumbar spine  Patient with chronic bilateral knee pain. Had right knee pain at home now with worsening left knee pain. Has difficulty ambulating at baseline and uses a walker.  Patient underwent bilateral knee replacements approximately 20 years ago. The patient has a history of chronic pain for which she has used opiates in the past, but has been managing with non-opiate medications for the last year, will continue to try to avoid opiates.     * MRI 5/12/22  Severe complex findings, many of which seen on prior MRI in 2017. (See report for further details)  * CT L knee 5/13/22: No evidence of fracture or TKA loosening. Mild soft tissue swelling superior to the knee. Large cheryl protuberance off the inferior patella, may be residual of prior trauma.    * U/S of the legs on 5/13/22 was limited but negative for DVT. Ordered by ortho for evaluation of knee pain in the context of COVID.      Appreciate ortho consult. Ordered MRI as above. Appreciate their input re: interpretation of these results and correlation with knee pain. On exam, pain seems to be localized to knees and worse with movement. No back pain or radicular type pain reported. No sensory deficits. Does okay with straight leg raise and able to lift each leg off the bed and hold for several seconds. Pain induced by flexion and extension of knee L > R. No warmth or overlying skin changes. I am unable to detect clear synovitis due to body habitus.     Per ortho, \"MRI of lumbar spine Iwhile abnormal, does not demonstrate obvious corresponding pathology.\" Agree. No further recommendations regarding abnormalities seen.     CT shows no loosening of prosthesis or occult fracture. There I some soft tissue swelling superior to knee and cheryl prominence of inferior patella. ? If that is causing some " of her pain. Appreciate ortho's input.  Any further recs?    Ortho, Is it possible cheryl prominence of patella is what is causing pain walking?     Requires TCU at discharge, as unable to mobilize.      Voltaren gel to bilateral knees, Tylenol as needed     Elevated CK, resolving   Could be due to covid 19 or due to her sitting for 3 hours prior to her fall.   CKD stage III Baseline Cr 1.2. Mild elevation.  HTN    Resumed PTA bumex on 05/14/22    Continue PTA amlodipine, lisinopril.     Recent Labs   Lab 05/13/22  1612 05/12/22  0154   * 652*     Recent Labs   Lab 05/15/22  0717 05/14/22  0823 05/13/22  1612 05/12/22  0154   CR 1.14* 1.14* 1.21* 1.22*       Covid 19 positive, ASYMPTOMATIC  Asymptomatic. Vaccinated. No indication for intervention.       Covid 19 precautions    H/o CVA, allergic to ASA     Continue on PTA plavix     Type 2 diabetes  Diet-controlled. Well controlled. Last a1c was 6.8%.   Recent Labs   Lab 05/15/22  1755 05/15/22  1222 05/15/22  0717 05/15/22  0214 05/14/22  2206 05/14/22  1718   GLC 99 124* 102* 132* 210* 121*     MILLIE  Does not use CPAP     Chronic Urinary retention  Chronic Solorio Catheter    As above, doubt CAUTI based on UA and UCx results from 5/13     Normocytic anemia Chronic, stable.  Patient denies any bleeding.  Recent Labs   Lab 05/15/22  0717 05/14/22  0823 05/13/22  1612 05/12/22  0154   HGB 10.3* 11.0* 11.1* 11.6*       Diet: Orders Placed This Encounter      Moderate Consistent Carb (60 g CHO per Meal) Diet     IVF: Holding.   Solorio Catheter: PRESENT, indication: Retention     DVT Prophylaxis: Plavix + Enoxaparin (Lovenox) subcutaneous started 05/13/22, not very mobile. COVID +   Code Status: Full Code     Disposition: Expected discharge TBD, once placement found and ortho evaluation complete.   Communication: Discussed with patient, daughter Josefa on 05/15/22.     Jessica Christine MD  Hospitalist Service  [facility]  Securely message with  the [employer] Web Console (learn more here)  Text page via [employer] Paging/Directory    -Data reviewed today: I reviewed all new labs and imaging results over the last 24 hours. I personally reviewed no images or EKG's today.    Physical Exam   Temp: 98.2  F (36.8  C) Temp src: Oral BP: (!) 152/66 Pulse: 70   Resp: 16 SpO2: 95 % O2 Device: None (Room air)    Vitals:    05/12/22 1700   Weight: 98.5 kg (217 lb 2.5 oz)     Vital Signs with Ranges  Temp:  [98.1  F (36.7  C)-102.4  F (39.1  C)] 98.2  F (36.8  C)  Pulse:  [69-90] 70  Resp:  [16-20] 16  BP: (113-152)/(62-85) 152/66  SpO2:  [91 %-97 %] 95 %  I/O last 3 completed shifts:  In: 120 [P.O.:120]  Out: 1200 [Urine:1200]    Today's Exam  Constitutional:  NAD,   Neuropsyche:  alert and oriented to situation and place, answers questions appropriately. Noted to have poor short-term memory.   Respiratory:  Breathing comfortable, rare scattered wheeze, otherwise just mildly decrease air flow an dclear.   Cardiovascular: Regular rate and rhythm, no edema.  GI:  soft, NT/ND, BS normal  Skin/Integumen: No acute rash or sign of bleeding.   MSK: Obese, see above.     Medications   All medications reviewed on 05/15/22    - MEDICATION INSTRUCTIONS -         remdesivir  100 mg Intravenous Q24H    And     sodium chloride 0.9%  50 mL Intravenous Q24H     acetaminophen  1,000 mg Oral At Bedtime     amLODIPine  2.5 mg Oral Daily     bumetanide  0.5 mg Oral Daily     clopidogrel  75 mg Oral Daily     diclofenac  2 g Topical 4x Daily     enoxaparin ANTICOAGULANT  40 mg Subcutaneous Q24H     insulin aspart  1-3 Units Subcutaneous TID AC     insulin aspart  1-3 Units Subcutaneous At Bedtime     lisinopril  20 mg Oral Daily     miconazole   Topical BID     sodium chloride (PF)  3 mL Intracatheter Q8H     sodium chloride (PF)  3 mL Intracatheter Q8H       Data   Recent Labs   Lab 05/15/22  1755 05/15/22  1222 05/15/22  0717 05/14/22  1157 05/14/22  0823 05/13/22  1657 05/13/22  1612   WBC  --    --  5.2  --  5.4  --  5.4   HGB  --   --  10.3*  --  11.0*  --  11.1*   MCV  --   --  94  --  92  --  95   PLT  --   --  145*  --  155  --  162   INR  --   --  1.11  --   --   --   --    NA  --   --  134  --  133  --  135   POTASSIUM  --   --  3.6  --  3.8  --  4.0   CHLORIDE  --   --  103  --  103  --  104   CO2  --   --  24  --  24  --  26   BUN  --   --  24  --  25  --  27   CR  --   --  1.14*  --  1.14*  --  1.21*   ANIONGAP  --   --  7  --  6  --  5   JN  --   --  8.0*  --  8.5  --  9.0   GLC 99 124* 102*   < > 108*   < > 131*   ALBUMIN  --   --   --   --  2.7*  --   --    PROTTOTAL  --   --   --   --  6.4*  --   --    BILITOTAL  --   --   --   --  0.6  --   --    ALKPHOS  --   --   --   --  48  --   --    ALT  --   --   --   --  23  --   --    AST  --   --   --   --  47*  --   --     < > = values in this interval not displayed.       No results found for this or any previous visit (from the past 24 hour(s)).

## 2022-05-16 ENCOUNTER — APPOINTMENT (OUTPATIENT)
Dept: OCCUPATIONAL THERAPY | Facility: CLINIC | Age: 87
DRG: 555 | End: 2022-05-16
Payer: MEDICARE

## 2022-05-16 LAB
ANION GAP SERPL CALCULATED.3IONS-SCNC: 5 MMOL/L (ref 3–14)
BUN SERPL-MCNC: 32 MG/DL (ref 7–30)
CALCIUM SERPL-MCNC: 8.4 MG/DL (ref 8.5–10.1)
CHLORIDE BLD-SCNC: 106 MMOL/L (ref 94–109)
CO2 SERPL-SCNC: 26 MMOL/L (ref 20–32)
CREAT SERPL-MCNC: 1.08 MG/DL (ref 0.52–1.04)
CRP SERPL-MCNC: 76.4 MG/L (ref 0–8)
D DIMER PPP FEU-MCNC: 1.44 UG/ML FEU (ref 0–0.5)
ERYTHROCYTE [DISTWIDTH] IN BLOOD BY AUTOMATED COUNT: 12.6 % (ref 10–15)
GFR SERPL CREATININE-BSD FRML MDRD: 49 ML/MIN/1.73M2
GLUCOSE BLD-MCNC: 139 MG/DL (ref 70–99)
GLUCOSE BLDC GLUCOMTR-MCNC: 109 MG/DL (ref 70–99)
GLUCOSE BLDC GLUCOMTR-MCNC: 111 MG/DL (ref 70–99)
GLUCOSE BLDC GLUCOMTR-MCNC: 124 MG/DL (ref 70–99)
GLUCOSE BLDC GLUCOMTR-MCNC: 150 MG/DL (ref 70–99)
GLUCOSE BLDC GLUCOMTR-MCNC: 82 MG/DL (ref 70–99)
HCT VFR BLD AUTO: 32.2 % (ref 35–47)
HGB BLD-MCNC: 10.8 G/DL (ref 11.7–15.7)
MCH RBC QN AUTO: 30.4 PG (ref 26.5–33)
MCHC RBC AUTO-ENTMCNC: 33.5 G/DL (ref 31.5–36.5)
MCV RBC AUTO: 91 FL (ref 78–100)
PLATELET # BLD AUTO: 153 10E3/UL (ref 150–450)
POTASSIUM BLD-SCNC: 4 MMOL/L (ref 3.4–5.3)
RBC # BLD AUTO: 3.55 10E6/UL (ref 3.8–5.2)
SODIUM SERPL-SCNC: 137 MMOL/L (ref 133–144)
WBC # BLD AUTO: 3.4 10E3/UL (ref 4–11)

## 2022-05-16 PROCEDURE — 99233 SBSQ HOSP IP/OBS HIGH 50: CPT | Performed by: HOSPITALIST

## 2022-05-16 PROCEDURE — 250N000013 HC RX MED GY IP 250 OP 250 PS 637: Performed by: INTERNAL MEDICINE

## 2022-05-16 PROCEDURE — 85014 HEMATOCRIT: CPT | Performed by: INTERNAL MEDICINE

## 2022-05-16 PROCEDURE — 258N000003 HC RX IP 258 OP 636: Performed by: INTERNAL MEDICINE

## 2022-05-16 PROCEDURE — 86140 C-REACTIVE PROTEIN: CPT | Performed by: INTERNAL MEDICINE

## 2022-05-16 PROCEDURE — 250N000013 HC RX MED GY IP 250 OP 250 PS 637: Performed by: HOSPITALIST

## 2022-05-16 PROCEDURE — 36415 COLL VENOUS BLD VENIPUNCTURE: CPT | Performed by: INTERNAL MEDICINE

## 2022-05-16 PROCEDURE — 120N000001 HC R&B MED SURG/OB

## 2022-05-16 PROCEDURE — 85379 FIBRIN DEGRADATION QUANT: CPT | Performed by: INTERNAL MEDICINE

## 2022-05-16 PROCEDURE — 250N000011 HC RX IP 250 OP 636: Performed by: INTERNAL MEDICINE

## 2022-05-16 PROCEDURE — 80048 BASIC METABOLIC PNL TOTAL CA: CPT | Performed by: INTERNAL MEDICINE

## 2022-05-16 PROCEDURE — 97530 THERAPEUTIC ACTIVITIES: CPT | Mod: GO | Performed by: OCCUPATIONAL THERAPIST

## 2022-05-16 RX ADMIN — LISINOPRIL 20 MG: 20 TABLET ORAL at 09:24

## 2022-05-16 RX ADMIN — GUAIFENESIN 10 ML: 200 SOLUTION ORAL at 22:40

## 2022-05-16 RX ADMIN — INSULIN ASPART 1 UNITS: 100 INJECTION, SOLUTION INTRAVENOUS; SUBCUTANEOUS at 12:20

## 2022-05-16 RX ADMIN — DICLOFENAC SODIUM 2 G: 10 GEL TOPICAL at 17:07

## 2022-05-16 RX ADMIN — AMLODIPINE BESYLATE 2.5 MG: 2.5 TABLET ORAL at 09:23

## 2022-05-16 RX ADMIN — MICONAZOLE NITRATE: 20 POWDER TOPICAL at 09:27

## 2022-05-16 RX ADMIN — MICONAZOLE NITRATE: 20 POWDER TOPICAL at 20:31

## 2022-05-16 RX ADMIN — CLOPIDOGREL BISULFATE 75 MG: 75 TABLET ORAL at 09:24

## 2022-05-16 RX ADMIN — DICLOFENAC SODIUM 2 G: 10 GEL TOPICAL at 20:52

## 2022-05-16 RX ADMIN — DICLOFENAC SODIUM 2 G: 10 GEL TOPICAL at 09:24

## 2022-05-16 RX ADMIN — BENZONATATE 100 MG: 100 CAPSULE ORAL at 12:14

## 2022-05-16 RX ADMIN — BENZONATATE 100 MG: 100 CAPSULE ORAL at 00:06

## 2022-05-16 RX ADMIN — REMDESIVIR 100 MG: 100 INJECTION, POWDER, LYOPHILIZED, FOR SOLUTION INTRAVENOUS at 17:06

## 2022-05-16 RX ADMIN — ACETAMINOPHEN 1000 MG: 500 TABLET, FILM COATED ORAL at 20:52

## 2022-05-16 RX ADMIN — SODIUM CHLORIDE 50 ML: 9 INJECTION, SOLUTION INTRAVENOUS at 17:07

## 2022-05-16 RX ADMIN — DICLOFENAC SODIUM 2 G: 10 GEL TOPICAL at 12:13

## 2022-05-16 RX ADMIN — GUAIFENESIN 10 ML: 200 SOLUTION ORAL at 12:13

## 2022-05-16 RX ADMIN — BUMETANIDE 0.5 MG: 0.5 TABLET ORAL at 09:23

## 2022-05-16 RX ADMIN — ENOXAPARIN SODIUM 40 MG: 40 INJECTION SUBCUTANEOUS at 17:06

## 2022-05-16 ASSESSMENT — ACTIVITIES OF DAILY LIVING (ADL)
ADLS_ACUITY_SCORE: 46
ADLS_ACUITY_SCORE: 50
ADLS_ACUITY_SCORE: 46

## 2022-05-16 NOTE — PROGRESS NOTES
Ortho progress note    Patient's MRI  Lumbar spine reviewed: No areas concerning for causing radicular symptoms or weakness in either LE    Patients left knee CT reviewed: No fractures noted, well seated revision components of TKA, no dislocation    US of LE limited due to body habitus but negative for DVT of the visualized veins    Continue to work with therapy  No changes in plan  Non-operative treatment    Nikole Blandon PAC

## 2022-05-16 NOTE — PROGRESS NOTES
[facility]    Medicine Progress Note - Hospitalist Service    Date of Admission:  5/11/2022    Assessment & Plan         Sarah Noble is a 87 year old female with a past medical history of type 2 diabetes, hypertension, CKD stage III, obesity, chronic knee pain and back pain, chronic urinary retention with a indwelling Solorio catheter presents to hospital after fall for knee pain.  She was incidentally COVID positive on admission but without symptoms at that time; she has since had COVID symptoms starting ~ 5/13.      Mild confusion on 5/13, IMPROVED  Mild COVID  Doubt CAUTI   * Screening COVD PCR + on admission (5/11) with clear CXR, and no sx of COVID.  Subsequently developed fever on 5/13; cough and SOB on 05/14/22 > started 3 day course of Remdesivir.  * Family concerned about UTI initially due to confusion (no sx of COVID at that time).   * UA showed only 15 WBC, > 182 RBC AFTER cathter change.   * Started meropenem 5/13/22 for fever prior to development of COVID sx. UCx growing mixed genital drew and with only 15 WBC on UA, doubt infection. Stopped meropenem on 05/15/22.    * CXR clear on 5/13 and 5/14.    * BCx NGTD      Given development of respiratory symptoms, cough, wheezing with O2 of 94%, increased risk of progression > started Remdesivir x 3 days on 5/14/22.     Combivent inhalers    Guaifenessen for cough.     On 05/15/22, Last fever, T of 102.4 on 5/14/22 @ 1920. O2 saturations 93% on RA. Hemodynamically stable. WBC normal. DD and CRP elevated.     Started lovenox on 5/13. Follow d-dimer.     5/16 - Remains afebrile on RA     Bilateral knee pain, L > R   Abnormal MRI of the lumbar spine  Patient with chronic bilateral knee pain. Had right knee pain at home now with worsening left knee pain. Has difficulty ambulating at baseline and uses a walker.  Patient underwent bilateral knee replacements approximately 20 years ago. The patient has a history of chronic pain for  "which she has used opiates in the past, but has been managing with non-opiate medications for the last year, will continue to try to avoid opiates.    * MRI 5/12/22  Severe complex findings, many of which seen on prior MRI in 2017. (See report for further details)  * CT L knee 5/13/22: No evidence of fracture or TKA loosening. Mild soft tissue swelling superior to the knee. Large cheryl protuberance off the inferior patella, may be residual of prior trauma.    * U/S of the legs on 5/13/22 was limited but negative for DVT. Ordered by ortho for evaluation of knee pain in the context of COVID.     Appreciate ortho consult. Ordered MRI as above. Appreciate their input re: interpretation of these results and correlation with knee pain. On exam, pain seems to be localized to knees and worse with movement. No back pain or radicular type pain reported. No sensory deficits. Does okay with straight leg raise and able to lift each leg off the bed and hold for several seconds. Pain induced by flexion and extension of knee L > R. No warmth or overlying skin changes. I am unable to detect clear synovitis due to body habitus.     Per ortho, \"MRI of lumbar spine Iwhile abnormal, does not demonstrate obvious corresponding pathology.\" Agree. No further recommendations regarding abnormalities seen.     CT shows no loosening of prosthesis or occult fracture. Some soft tissue swelling superior to knee and cheryl prominence of inferior patella. ? If that is causing some of her pain. Appreciate ortho's input.     Requires TCU at discharge, as unable to mobilize.      Voltaren gel to bilateral knees, Tylenol as needed     Elevated CK, resolving   Could be due to covid 19 or due to her sitting for 3 hours prior to her fall.   CKD stage III Baseline Cr 1.2. Mild elevation.  HTN    Resumed PTA bumex on 05/14/22    Continue PTA amlodipine, lisinopril.      Covid 19 positive, ASYMPTOMATIC  Asymptomatic. Vaccinated. No indication for intervention. " "   - Covid 19 precautions     H/o CVA, allergic to ASA     Continue on PTA plavix     Type 2 diabetes  Diet-controlled. Well controlled. Last a1c was 6.8%.     MILLIE  Does not use CPAP     Chronic Urinary retention  Chronic Solorio Catheter    As above, doubt CAUTI based on UA and UCx results from 5/13     Normocytic anemia  Chronic, stable.  Patient denies any bleeding.        Diet: Moderate Consistent Carb (60 g CHO per Meal) Diet    DVT Prophylaxis: Enoxaparin (Lovenox) SQ  Solorio Catheter: PRESENT, indication: Retention  Central Lines: None  Cardiac Monitoring: None  Code Status: Full Code      Disposition Plan   Expected Discharge: 05/16/2022     Anticipated discharge location: inpatient rehabilitation facility           The patient's care was discussed with the Bedside Nurse, Patient and Patient's Family.    Bruce Najera MD  Hospitalist Service  [facility]  Securely message with the Vocera Web Console (learn more here)  Text page via [employer] Paging/Directory         Clinically Significant Risk Factors Present on Admission             # Obesity: Estimated body mass index is 37.27 kg/m  as calculated from the following:    Height as of this encounter: 1.626 m (5' 4\").    Weight as of this encounter: 98.5 kg (217 lb 2.5 oz).      Total encounter time 36 minutes with greater than 50% spent in direct patient care, discussion and counseling with patient in the room regarding plans going forward and possible need for TCU at discharge among other things, updating patient's daughter over the phone with patient's status and discharge considerations, and coordination of care with staff and RN.    ______________________________________________________________________    Interval History   Care assumed today, pt seen and examined mid afternoon. No new complaints - denies fevers, pain, or sob or major coughing today. Updated daughter Pat on phone.    Data reviewed today: I reviewed all medications, " new labs and imaging results over the last 24 hours. I personally reviewed no images or EKG's today.    Physical Exam   Vital Signs: Temp: 97.8  F (36.6  C) Temp src: Oral BP: (!) 158/89 Pulse: 70   Resp: 16 SpO2: 92 % O2 Device: None (Room air)    Weight: 217 lbs 2.45 oz    Gen: NAD, pleasant  HEENT: EOMI, MMM  Resp: no focal crackles,  no wheezes, no increased work of resp  CV: S1S2 heard, reg rhythm, reg rate  Abdo: soft, nontender, nondistended, bowel sounds present  Ext: calves nontender, well perfused  Neuro: AAO self and situation, CN grossly intact, no facial asymmetry      Data   Recent Labs   Lab 05/16/22  1215 05/16/22  0921 05/16/22  0748 05/15/22  1222 05/15/22  0717 05/14/22  1157 05/14/22  0823   WBC  --   --  3.4*  --  5.2  --  5.4   HGB  --   --  10.8*  --  10.3*  --  11.0*   MCV  --   --  91  --  94  --  92   PLT  --   --  153  --  145*  --  155   INR  --   --   --   --  1.11  --   --    NA  --   --  137  --  134  --  133   POTASSIUM  --   --  4.0  --  3.6  --  3.8   CHLORIDE  --   --  106  --  103  --  103   CO2  --   --  26  --  24  --  24   BUN  --   --  32*  --  24  --  25   CR  --   --  1.08*  --  1.14*  --  1.14*   ANIONGAP  --   --  5  --  7  --  6   JN  --   --  8.4*  --  8.0*  --  8.5   * 109* 139*   < > 102*   < > 108*   ALBUMIN  --   --   --   --   --   --  2.7*   PROTTOTAL  --   --   --   --   --   --  6.4*   BILITOTAL  --   --   --   --   --   --  0.6   ALKPHOS  --   --   --   --   --   --  48   ALT  --   --   --   --   --   --  23   AST  --   --   --   --   --   --  47*    < > = values in this interval not displayed.     No results found for this or any previous visit (from the past 24 hour(s)).

## 2022-05-16 NOTE — PLAN OF CARE
Date/Time 05/16/22 1730    Medical    Diagnosis:knee pain and COVID+  POD#: Hosp day 5  Mental Status:A&Ox3 forgetful and confused to time  Activity/dangle repositioned x 2  Diet: mod carb  Pain: topical cream  Joseph/Voiding: chronic joseph  Tele/Restraints/Iso: special precautions  02/LDA: IV SL   D/C Date:TBD  Other Info: BS stable insulin coverage x1, rash in abd folds and prei area improving with powder. Given PRN cough meds x1, cough improved.

## 2022-05-16 NOTE — PLAN OF CARE
Medical  Diagnosis:Bilateral knee pain/ covid +  Mental Status:A&O x 3-4 forgetful  Activity: Lift/ turn and repo  Diet:Mod carb  Pain: denies  Solorio: draining adequately  Iso: covid precautions  LDA: IV SL  D/C Date:Pending improvement.  Other Info: tessalon given x 1 for cough, no stool overnight.

## 2022-05-17 LAB
ANION GAP SERPL CALCULATED.3IONS-SCNC: 5 MMOL/L (ref 3–14)
BUN SERPL-MCNC: 29 MG/DL (ref 7–30)
CALCIUM SERPL-MCNC: 8.7 MG/DL (ref 8.5–10.1)
CHLORIDE BLD-SCNC: 109 MMOL/L (ref 94–109)
CO2 SERPL-SCNC: 24 MMOL/L (ref 20–32)
CREAT SERPL-MCNC: 0.92 MG/DL (ref 0.52–1.04)
CRP SERPL-MCNC: 45.4 MG/L (ref 0–8)
D DIMER PPP FEU-MCNC: 1.65 UG/ML FEU (ref 0–0.5)
ERYTHROCYTE [DISTWIDTH] IN BLOOD BY AUTOMATED COUNT: 12.5 % (ref 10–15)
GFR SERPL CREATININE-BSD FRML MDRD: 60 ML/MIN/1.73M2
GLUCOSE BLD-MCNC: 100 MG/DL (ref 70–99)
GLUCOSE BLDC GLUCOMTR-MCNC: 121 MG/DL (ref 70–99)
GLUCOSE BLDC GLUCOMTR-MCNC: 133 MG/DL (ref 70–99)
GLUCOSE BLDC GLUCOMTR-MCNC: 156 MG/DL (ref 70–99)
GLUCOSE BLDC GLUCOMTR-MCNC: 160 MG/DL (ref 70–99)
GLUCOSE BLDC GLUCOMTR-MCNC: 92 MG/DL (ref 70–99)
HCT VFR BLD AUTO: 34.4 % (ref 35–47)
HGB BLD-MCNC: 11.1 G/DL (ref 11.7–15.7)
MCH RBC QN AUTO: 29.9 PG (ref 26.5–33)
MCHC RBC AUTO-ENTMCNC: 32.3 G/DL (ref 31.5–36.5)
MCV RBC AUTO: 93 FL (ref 78–100)
PLATELET # BLD AUTO: 163 10E3/UL (ref 150–450)
POTASSIUM BLD-SCNC: 4 MMOL/L (ref 3.4–5.3)
RBC # BLD AUTO: 3.71 10E6/UL (ref 3.8–5.2)
SODIUM SERPL-SCNC: 138 MMOL/L (ref 133–144)
WBC # BLD AUTO: 3.4 10E3/UL (ref 4–11)

## 2022-05-17 PROCEDURE — 250N000013 HC RX MED GY IP 250 OP 250 PS 637: Performed by: INTERNAL MEDICINE

## 2022-05-17 PROCEDURE — 99232 SBSQ HOSP IP/OBS MODERATE 35: CPT | Performed by: HOSPITALIST

## 2022-05-17 PROCEDURE — 85379 FIBRIN DEGRADATION QUANT: CPT | Performed by: INTERNAL MEDICINE

## 2022-05-17 PROCEDURE — 250N000013 HC RX MED GY IP 250 OP 250 PS 637: Performed by: HOSPITALIST

## 2022-05-17 PROCEDURE — 86140 C-REACTIVE PROTEIN: CPT | Performed by: INTERNAL MEDICINE

## 2022-05-17 PROCEDURE — 80048 BASIC METABOLIC PNL TOTAL CA: CPT | Performed by: INTERNAL MEDICINE

## 2022-05-17 PROCEDURE — 120N000001 HC R&B MED SURG/OB

## 2022-05-17 PROCEDURE — 36415 COLL VENOUS BLD VENIPUNCTURE: CPT | Performed by: INTERNAL MEDICINE

## 2022-05-17 PROCEDURE — 250N000011 HC RX IP 250 OP 636: Performed by: INTERNAL MEDICINE

## 2022-05-17 PROCEDURE — 85027 COMPLETE CBC AUTOMATED: CPT | Performed by: INTERNAL MEDICINE

## 2022-05-17 RX ADMIN — BUMETANIDE 0.5 MG: 0.5 TABLET ORAL at 08:30

## 2022-05-17 RX ADMIN — LISINOPRIL 20 MG: 20 TABLET ORAL at 08:31

## 2022-05-17 RX ADMIN — ENOXAPARIN SODIUM 40 MG: 40 INJECTION SUBCUTANEOUS at 17:21

## 2022-05-17 RX ADMIN — MICONAZOLE NITRATE: 20 POWDER TOPICAL at 22:31

## 2022-05-17 RX ADMIN — AMLODIPINE BESYLATE 2.5 MG: 2.5 TABLET ORAL at 08:30

## 2022-05-17 RX ADMIN — GUAIFENESIN 10 ML: 200 SOLUTION ORAL at 17:40

## 2022-05-17 RX ADMIN — DICLOFENAC SODIUM 2 G: 10 GEL TOPICAL at 10:08

## 2022-05-17 RX ADMIN — MICONAZOLE NITRATE: 20 POWDER TOPICAL at 08:33

## 2022-05-17 RX ADMIN — ACETAMINOPHEN 1000 MG: 500 TABLET, FILM COATED ORAL at 22:31

## 2022-05-17 RX ADMIN — INSULIN ASPART 1 UNITS: 100 INJECTION, SOLUTION INTRAVENOUS; SUBCUTANEOUS at 12:54

## 2022-05-17 RX ADMIN — DICLOFENAC SODIUM 2 G: 10 GEL TOPICAL at 17:21

## 2022-05-17 RX ADMIN — GUAIFENESIN 10 ML: 200 SOLUTION ORAL at 08:31

## 2022-05-17 RX ADMIN — DICLOFENAC SODIUM 2 G: 10 GEL TOPICAL at 22:32

## 2022-05-17 RX ADMIN — DICLOFENAC SODIUM 2 G: 10 GEL TOPICAL at 12:55

## 2022-05-17 RX ADMIN — CLOPIDOGREL BISULFATE 75 MG: 75 TABLET ORAL at 08:31

## 2022-05-17 RX ADMIN — ACETAMINOPHEN 650 MG: 325 TABLET ORAL at 08:30

## 2022-05-17 ASSESSMENT — ACTIVITIES OF DAILY LIVING (ADL)
TOILETING_ASSISTANCE: TOILETING DIFFICULTY, ASSISTANCE 1 PERSON
ADLS_ACUITY_SCORE: 52
ADLS_ACUITY_SCORE: 50
DOING_ERRANDS_INDEPENDENTLY_DIFFICULTY: YES
CHANGE_IN_FUNCTIONAL_STATUS_SINCE_ONSET_OF_CURRENT_ILLNESS/INJURY: YES
ADLS_ACUITY_SCORE: 46
ADLS_ACUITY_SCORE: 52
TOILETING_MANAGEMENT: FOLEY CATH
ADLS_ACUITY_SCORE: 46
DRESSING/BATHING_DIFFICULTY: YES
TOILETING_ISSUES: YES
ADLS_ACUITY_SCORE: 52
CONCENTRATING,_REMEMBERING_OR_MAKING_DECISIONS_DIFFICULTY: YES
ADLS_ACUITY_SCORE: 52
ADLS_ACUITY_SCORE: 52
WALKING_OR_CLIMBING_STAIRS: STAIR CLIMBING DIFFICULTY, ASSISTANCE 1 PERSON
ADLS_ACUITY_SCORE: 52
ADLS_ACUITY_SCORE: 52
ADLS_ACUITY_SCORE: 50
DIFFICULTY_EATING/SWALLOWING: NO
WALKING_OR_CLIMBING_STAIRS_DIFFICULTY: YES
ADLS_ACUITY_SCORE: 52
WEAR_GLASSES_OR_BLIND: NO
DRESSING/BATHING: BATHING DIFFICULTY, ASSISTANCE 1 PERSON;DRESSING DIFFICULTY, ASSISTANCE 1 PERSON

## 2022-05-17 NOTE — PROGRESS NOTES
[facility]    Medicine Progress Note - Hospitalist Service    Date of Admission:  5/11/2022    Assessment & Plan        Sarah Noble is a 87 year old female with a past medical history of type 2 diabetes, hypertension, CKD stage III, obesity, chronic knee pain and back pain, chronic urinary retention with a indwelling Solorio catheter presents to hospital after fall for knee pain.  She was incidentally COVID positive on admission but without symptoms at that time; she has since had COVID symptoms starting ~ 5/13.        Mild confusion on 5/13, IMPROVED  Mild COVID  Doubt CAUTI   * Screening COVD PCR + on admission (5/11) with clear CXR, and no sx of COVID.  Subsequently developed fever on 5/13; cough and SOB on 05/14/22 > started 3 day course of Remdesivir.  * Family concerned about UTI initially due to confusion (no sx of COVID at that time).   * UA showed only 15 WBC, > 182 RBC AFTER cathter change.   * Started meropenem 5/13/22 for fever prior to development of COVID sx. UCx growing mixed genital drew and with only 15 WBC on UA, doubt infection. Stopped meropenem on 05/15/22.    * CXR clear on 5/13 and 5/14.    * BCx NGTD      Given development of respiratory symptoms, cough, wheezing with O2 of 94%, increased risk of progression > started Remdesivir x 3 days on 5/14/22.     Combivent inhalers    Guaifenessen for cough.     On 05/15/22, Last fever, T of 102.4 on 5/14/22 @ 1920. O2 saturations 93% on RA. Hemodynamically stable. WBC normal. DD and CRP elevated.     Started lovenox on 5/13. Follow d-dimer.     Completed 3d short course of remdesivir 5/14 - 5/16 5/16 - 5/17 - Remains afebrile and stable on RA     Bilateral knee pain, L > R   Abnormal MRI of the lumbar spine  Patient with chronic bilateral knee pain. Had right knee pain at home now with worsening left knee pain. Has difficulty ambulating at baseline and uses a walker.  Patient underwent bilateral knee replacements  "approximately 20 years ago. The patient has a history of chronic pain for which she has used opiates in the past, but has been managing with non-opiate medications for the last year, will continue to try to avoid opiates.    * MRI 5/12/22  Severe complex findings, many of which seen on prior MRI in 2017. (See report for further details)  * CT L knee 5/13/22: No evidence of fracture or TKA loosening. Mild soft tissue swelling superior to the knee. Large cheyrl protuberance off the inferior patella, may be residual of prior trauma.    * U/S of the legs on 5/13/22 was limited but negative for DVT. Ordered by ortho for evaluation of knee pain in the context of COVID.     Appreciate ortho consult. Ordered MRI as above. Appreciate their input re: interpretation of these results and correlation with knee pain. On exam, pain seems to be localized to knees and worse with movement. No back pain or radicular type pain reported. No sensory deficits. Does okay with straight leg raise and able to lift each leg off the bed and hold for several seconds. Pain induced by flexion and extension of knee L > R. No warmth or overlying skin changes. I am unable to detect clear synovitis due to body habitus.     Per ortho, \"MRI of lumbar spine Iwhile abnormal, does not demonstrate obvious corresponding pathology.\" Agree. No further recommendations regarding abnormalities seen.     CT shows no loosening of prosthesis or occult fracture. Some soft tissue swelling superior to knee and cheryl prominence of inferior patella. ? If that is causing some of her pain. Appreciate ortho's input.     Requires TCU at discharge, as unable to mobilize.      Voltaren gel to bilateral knees, Tylenol as needed    Discharge complicated by COVID positivity - limits TCU options.     Elevated CK, resolving   Could be due to covid 19 or due to her sitting for 3 hours prior to her fall.   CKD stage III Baseline Cr 1.2. Mild elevation.  HTN    Resumed PTA bumex on " "05/14/22    Continue PTA amlodipine, lisinopril.      Covid 19 positive, ASYMPTOMATIC  Asymptomatic. Vaccinated. No indication for intervention.    - Covid 19 precautions     H/o CVA, allergic to ASA     Continue on PTA plavix     Type 2 diabetes  Diet-controlled. Well controlled. Last a1c was 6.8%.      MILLIE  Does not use CPAP     Chronic Urinary retention  Chronic Solorio Catheter    As above, doubt CAUTI based on UA and UCx results from 5/13     Normocytic anemia  Chronic, stable.  Patient denies any bleeding.        Diet: Moderate Consistent Carb (60 g CHO per Meal) Diet    DVT Prophylaxis: Enoxaparin (Lovenox) SQ  Solorio Catheter: PRESENT, indication: Retention  Central Lines: None  Cardiac Monitoring: None  Code Status: Full Code      Disposition Plan   Expected Discharge: 05/18/2022     Anticipated discharge location: inpatient rehabilitation facility           The patient's care was discussed with the Bedside Nurse and Patient.    Bruce Najera MD  Hospitalist Service  [facility]  Securely message with the Vocera Web Console (learn more here)  Text page via [employer] Paging/Directory         Clinically Significant Risk Factors Present on Admission             # Obesity: Estimated body mass index is 37.27 kg/m  as calculated from the following:    Height as of this encounter: 1.626 m (5' 4\").    Weight as of this encounter: 98.5 kg (217 lb 2.5 oz).      ______________________________________________________________________    Interval History   Patient seen and examined.  Overall no new complaints.  Continues to be stable on room air.  No fevers or worsening of cough.  Currently denying any knee pain while relaxed and bed.    Data reviewed today: I reviewed all medications, new labs and imaging results over the last 24 hours. I personally reviewed no images or EKG's today.    Physical Exam   Vital Signs: Temp: 97.6  F (36.4  C) Temp src: Oral BP: (!) 147/86 Pulse: 70   Resp: 16 SpO2: 100 " % O2 Device: None (Room air)    Weight: 217 lbs 2.45 oz    Gen: NAD, pleasant  HEENT: EOMI, MMM  Resp: no focal crackles,  no wheezes, no increased work of resp  CV: S1S2 heard, reg rhythm, reg rate  Abdo: soft, nontender, nondistended, bowel sounds present  Ext: calves nontender, well perfused  Neuro: awake and alert, conversing appropriately, CN grossly intact, no facial asymmetry          Data   Recent Labs   Lab 05/17/22  1627 05/17/22  1128 05/17/22  0814 05/17/22  0757 05/16/22  0921 05/16/22  0748 05/15/22  1222 05/15/22  0717 05/14/22  1157 05/14/22  0823   WBC  --   --   --  3.4*  --  3.4*  --  5.2  --  5.4   HGB  --   --   --  11.1*  --  10.8*  --  10.3*  --  11.0*   MCV  --   --   --  93  --  91  --  94  --  92   PLT  --   --   --  163  --  153  --  145*  --  155   INR  --   --   --   --   --   --   --  1.11  --   --    NA  --   --   --  138  --  137  --  134  --  133   POTASSIUM  --   --   --  4.0  --  4.0  --  3.6  --  3.8   CHLORIDE  --   --   --  109  --  106  --  103  --  103   CO2  --   --   --  24  --  26  --  24  --  24   BUN  --   --   --  29  --  32*  --  24  --  25   CR  --   --   --  0.92  --  1.08*  --  1.14*  --  1.14*   ANIONGAP  --   --   --  5  --  5  --  7  --  6   JN  --   --   --  8.7  --  8.4*  --  8.0*  --  8.5   * 160* 92 100*   < > 139*   < > 102*   < > 108*   ALBUMIN  --   --   --   --   --   --   --   --   --  2.7*   PROTTOTAL  --   --   --   --   --   --   --   --   --  6.4*   BILITOTAL  --   --   --   --   --   --   --   --   --  0.6   ALKPHOS  --   --   --   --   --   --   --   --   --  48   ALT  --   --   --   --   --   --   --   --   --  23   AST  --   --   --   --   --   --   --   --   --  47*    < > = values in this interval not displayed.     No results found for this or any previous visit (from the past 24 hour(s)).

## 2022-05-17 NOTE — PROGRESS NOTES
Patient a/o x2-3, forgetful. Vitals stable on room air. Denies pain. Turn/repo overnight. Solorio in place with adequate output, two incontinent BM's last night. No sliding scale insulin needed. PRN cough med given x1. Continue to monitor.

## 2022-05-18 ENCOUNTER — APPOINTMENT (OUTPATIENT)
Dept: PHYSICAL THERAPY | Facility: CLINIC | Age: 87
DRG: 555 | End: 2022-05-18
Payer: MEDICARE

## 2022-05-18 LAB
ANION GAP SERPL CALCULATED.3IONS-SCNC: 4 MMOL/L (ref 3–14)
BUN SERPL-MCNC: 26 MG/DL (ref 7–30)
CALCIUM SERPL-MCNC: 9.2 MG/DL (ref 8.5–10.1)
CHLORIDE BLD-SCNC: 109 MMOL/L (ref 94–109)
CO2 SERPL-SCNC: 25 MMOL/L (ref 20–32)
CREAT SERPL-MCNC: 0.89 MG/DL (ref 0.52–1.04)
CRP SERPL-MCNC: 26.2 MG/L (ref 0–8)
D DIMER PPP FEU-MCNC: 1.39 UG/ML FEU (ref 0–0.5)
ERYTHROCYTE [DISTWIDTH] IN BLOOD BY AUTOMATED COUNT: 12.3 % (ref 10–15)
GFR SERPL CREATININE-BSD FRML MDRD: 62 ML/MIN/1.73M2
GLUCOSE BLD-MCNC: 105 MG/DL (ref 70–99)
GLUCOSE BLDC GLUCOMTR-MCNC: 100 MG/DL (ref 70–99)
GLUCOSE BLDC GLUCOMTR-MCNC: 135 MG/DL (ref 70–99)
GLUCOSE BLDC GLUCOMTR-MCNC: 175 MG/DL (ref 70–99)
GLUCOSE BLDC GLUCOMTR-MCNC: 191 MG/DL (ref 70–99)
HCT VFR BLD AUTO: 34.9 % (ref 35–47)
HGB BLD-MCNC: 11.4 G/DL (ref 11.7–15.7)
MCH RBC QN AUTO: 30.1 PG (ref 26.5–33)
MCHC RBC AUTO-ENTMCNC: 32.7 G/DL (ref 31.5–36.5)
MCV RBC AUTO: 92 FL (ref 78–100)
PLATELET # BLD AUTO: 189 10E3/UL (ref 150–450)
POTASSIUM BLD-SCNC: 3.8 MMOL/L (ref 3.4–5.3)
RBC # BLD AUTO: 3.79 10E6/UL (ref 3.8–5.2)
SODIUM SERPL-SCNC: 138 MMOL/L (ref 133–144)
WBC # BLD AUTO: 3.6 10E3/UL (ref 4–11)

## 2022-05-18 PROCEDURE — 250N000011 HC RX IP 250 OP 636: Performed by: INTERNAL MEDICINE

## 2022-05-18 PROCEDURE — 97116 GAIT TRAINING THERAPY: CPT | Mod: GP

## 2022-05-18 PROCEDURE — 85027 COMPLETE CBC AUTOMATED: CPT | Performed by: INTERNAL MEDICINE

## 2022-05-18 PROCEDURE — 250N000013 HC RX MED GY IP 250 OP 250 PS 637: Performed by: HOSPITALIST

## 2022-05-18 PROCEDURE — 97530 THERAPEUTIC ACTIVITIES: CPT | Mod: GP

## 2022-05-18 PROCEDURE — 99232 SBSQ HOSP IP/OBS MODERATE 35: CPT | Performed by: HOSPITALIST

## 2022-05-18 PROCEDURE — 85379 FIBRIN DEGRADATION QUANT: CPT | Performed by: INTERNAL MEDICINE

## 2022-05-18 PROCEDURE — 86140 C-REACTIVE PROTEIN: CPT | Performed by: INTERNAL MEDICINE

## 2022-05-18 PROCEDURE — 36415 COLL VENOUS BLD VENIPUNCTURE: CPT | Performed by: INTERNAL MEDICINE

## 2022-05-18 PROCEDURE — 120N000001 HC R&B MED SURG/OB

## 2022-05-18 PROCEDURE — 80048 BASIC METABOLIC PNL TOTAL CA: CPT | Performed by: INTERNAL MEDICINE

## 2022-05-18 PROCEDURE — 250N000013 HC RX MED GY IP 250 OP 250 PS 637: Performed by: INTERNAL MEDICINE

## 2022-05-18 PROCEDURE — 97110 THERAPEUTIC EXERCISES: CPT | Mod: GP

## 2022-05-18 RX ADMIN — CLOPIDOGREL BISULFATE 75 MG: 75 TABLET ORAL at 09:48

## 2022-05-18 RX ADMIN — DICLOFENAC SODIUM 2 G: 10 GEL TOPICAL at 09:48

## 2022-05-18 RX ADMIN — ACETAMINOPHEN 650 MG: 325 TABLET ORAL at 14:57

## 2022-05-18 RX ADMIN — LISINOPRIL 20 MG: 20 TABLET ORAL at 09:48

## 2022-05-18 RX ADMIN — ACETAMINOPHEN 1000 MG: 500 TABLET, FILM COATED ORAL at 21:18

## 2022-05-18 RX ADMIN — ACETAMINOPHEN 650 MG: 325 TABLET ORAL at 09:47

## 2022-05-18 RX ADMIN — AMLODIPINE BESYLATE 2.5 MG: 2.5 TABLET ORAL at 09:47

## 2022-05-18 RX ADMIN — MICONAZOLE NITRATE: 20 POWDER TOPICAL at 21:23

## 2022-05-18 RX ADMIN — BUMETANIDE 0.5 MG: 0.5 TABLET ORAL at 09:47

## 2022-05-18 RX ADMIN — INSULIN ASPART 1 UNITS: 100 INJECTION, SOLUTION INTRAVENOUS; SUBCUTANEOUS at 11:54

## 2022-05-18 RX ADMIN — DICLOFENAC SODIUM 2 G: 10 GEL TOPICAL at 12:07

## 2022-05-18 RX ADMIN — MICONAZOLE NITRATE: 20 POWDER TOPICAL at 09:48

## 2022-05-18 RX ADMIN — DICLOFENAC SODIUM 2 G: 10 GEL TOPICAL at 17:22

## 2022-05-18 RX ADMIN — GUAIFENESIN 10 ML: 200 SOLUTION ORAL at 09:48

## 2022-05-18 RX ADMIN — ENOXAPARIN SODIUM 40 MG: 40 INJECTION SUBCUTANEOUS at 17:21

## 2022-05-18 RX ADMIN — DICLOFENAC SODIUM 2 G: 10 GEL TOPICAL at 21:23

## 2022-05-18 ASSESSMENT — ACTIVITIES OF DAILY LIVING (ADL)
ADLS_ACUITY_SCORE: 52
ADLS_ACUITY_SCORE: 52
ADLS_ACUITY_SCORE: 47
ADLS_ACUITY_SCORE: 47
ADLS_ACUITY_SCORE: 52
ADLS_ACUITY_SCORE: 47
ADLS_ACUITY_SCORE: 47
ADLS_ACUITY_SCORE: 52
ADLS_ACUITY_SCORE: 52
ADLS_ACUITY_SCORE: 47
ADLS_ACUITY_SCORE: 47
ADLS_ACUITY_SCORE: 52

## 2022-05-18 NOTE — PROGRESS NOTES
[facility]    Medicine Progress Note - Hospitalist Service    Date of Admission:  5/11/2022    Assessment & Plan        Sarah Noble is a 87 year old female with a past medical history of type 2 diabetes, hypertension, CKD stage III, obesity, chronic knee pain and back pain, chronic urinary retention with a indwelling Solorio catheter presents to hospital after fall for knee pain.  She was incidentally COVID positive on admission but without symptoms at that time; she has since had COVID symptoms starting ~ 5/13.        Mild confusion on 5/13, IMPROVED  Mild COVID  Doubt CAUTI   * Screening COVD PCR + on admission (5/11) with clear CXR, and no sx of COVID.  Subsequently developed fever on 5/13; cough and SOB on 05/14/22 > started 3 day course of Remdesivir.  * Family concerned about UTI initially due to confusion (no sx of COVID at that time).   * UA showed only 15 WBC, > 182 RBC AFTER cathter change.   * Started meropenem 5/13/22 for fever prior to development of COVID sx. UCx growing mixed genital drew and with only 15 WBC on UA, doubt infection. Stopped meropenem on 05/15/22.    * CXR clear on 5/13 and 5/14.    * BCx NGTD      Given development of respiratory symptoms, cough, wheezing with O2 of 94%, increased risk of progression > started Remdesivir x 3 days on 5/14/22.     Combivent inhalers    Guaifenessen for cough.     On 05/15/22, Last fever, T of 102.4 on 5/14/22 @ 1920. O2 saturations 93% on RA. Hemodynamically stable. WBC normal. DD and CRP elevated.     Started lovenox on 5/13. Follow d-dimer.     Completed 3d short course of remdesivir 5/14 - 5/16 5/16 - 5/18 - Remains afebrile and stable on RA     Bilateral knee pain, L > R   Abnormal MRI of the lumbar spine  Patient with chronic bilateral knee pain. Had right knee pain at home now with worsening left knee pain. Has difficulty ambulating at baseline and uses a walker.  Patient underwent bilateral knee replacements  "approximately 20 years ago. The patient has a history of chronic pain for which she has used opiates in the past, but has been managing with non-opiate medications for the last year, will continue to try to avoid opiates.    * MRI 5/12/22  Severe complex findings, many of which seen on prior MRI in 2017. (See report for further details)  * CT L knee 5/13/22: No evidence of fracture or TKA loosening. Mild soft tissue swelling superior to the knee. Large cheryl protuberance off the inferior patella, may be residual of prior trauma.    * U/S of the legs on 5/13/22 was limited but negative for DVT. Ordered by ortho for evaluation of knee pain in the context of COVID.     Appreciate ortho consult. Ordered MRI as above. Appreciate their input re: interpretation of these results and correlation with knee pain. On exam, pain seems to be localized to knees and worse with movement. No back pain or radicular type pain reported. No sensory deficits. Does okay with straight leg raise and able to lift each leg off the bed and hold for several seconds. Pain induced by flexion and extension of knee L > R. No warmth or overlying skin changes. I am unable to detect clear synovitis due to body habitus.     Per ortho, \"MRI of lumbar spine Iwhile abnormal, does not demonstrate obvious corresponding pathology.\" Agree. No further recommendations regarding abnormalities seen.     CT shows no loosening of prosthesis or occult fracture. Some soft tissue swelling superior to knee and cheryl prominence of inferior patella. ? If that is causing some of her pain. Appreciate ortho's input.     Requires TCU at discharge, as unable to mobilize.      Voltaren gel to bilateral knees, Tylenol as needed    Discharge complicated by COVID positivity - limits TCU options.    5/18 - some improvement with PT - she is hoping to improve enough go home with family support     Elevated CK, resolving   Could be due to covid 19 or due to her sitting for 3 hours " prior to her fall.   CKD stage III Baseline Cr 1.2. Mild elevation.  HTN    Resumed PTA bumex on 05/14/22    Continue PTA amlodipine, lisinopril.      Covid 19 positive, ASYMPTOMATIC  Asymptomatic. Vaccinated. No indication for intervention. PCR positive 5/12/22   - Covid 19 precautions     H/o CVA, allergic to ASA     Continue on PTA plavix     Type 2 diabetes  Diet-controlled. Well controlled. Last a1c was 6.8%.      MILLIE  Does not use CPAP     Chronic Urinary retention  Chronic Solorio Catheter    As above, doubt CAUTI based on UA and UCx results from 5/13     Normocytic anemia  Chronic, stable.  Patient denies any bleeding.      Diet: Moderate Consistent Carb (60 g CHO per Meal) Diet    DVT Prophylaxis: Enoxaparin (Lovenox) SQ  Solorio Catheter: PRESENT, indication: Retention  Central Lines: None  Cardiac Monitoring: None  Code Status: Full Code      Disposition Plan   Expected Discharge: 05/22/2022     Anticipated discharge location: inpatient rehabilitation facility    Delays:     Placement - TCU            The patient's care was discussed with the Bedside Nurse, Patient and Patient's Family.    Bruce Najera MD  Hospitalist Service  [facility]  Securely message with the [employer]oc[employer] Web Console (learn more here)  Text page via [employer] Paging/Directory         Clinically Significant Risk Factors Present on Admission                    ______________________________________________________________________    Interval History   Seen and examined this afternoon. No new complaints - working with therapy. Improving slowly. Still on RA, afebrile, some ongoing chronic knee pain.     Data reviewed today: I reviewed all medications, new labs and imaging results over the last 24 hours. I personally reviewed no images or EKG's today.    Physical Exam   Vital Signs: Temp: 97.7  F (36.5  C) Temp src: Oral BP: (!) 162/90 Pulse: 86   Resp: 18 SpO2: 99 % O2 Device: None (Room air)    Weight: 217 lbs 2.45  oz    Gen: NAD, pleasant, elderly, up in chair  HEENT: EOMI, MMM  Resp: no focal crackles,  no wheezes, no increased work of resp  CV: S1S2 heard, reg rhythm, reg rate  Abdo: soft, nontender, nondistended, bowel sounds present  Ext: calves nontender, well perfused  Neuro: aaoxself and hospital, year etc not asked, CN grossly intact, no facial asymmetry, standing with walker and therapy at side, answering questions appropriately      Data   Recent Labs   Lab 05/18/22  1151 05/18/22  0850 05/18/22  0203 05/17/22  0814 05/17/22  0757 05/16/22  0921 05/16/22  0748 05/15/22  1222 05/15/22  0717 05/14/22  1157 05/14/22  0823   WBC  --  3.6*  --   --  3.4*  --  3.4*  --  5.2  --  5.4   HGB  --  11.4*  --   --  11.1*  --  10.8*  --  10.3*  --  11.0*   MCV  --  92  --   --  93  --  91  --  94  --  92   PLT  --  189  --   --  163  --  153  --  145*  --  155   INR  --   --   --   --   --   --   --   --  1.11  --   --    NA  --  138  --   --  138  --  137  --  134  --  133   POTASSIUM  --  3.8  --   --  4.0  --  4.0  --  3.6  --  3.8   CHLORIDE  --  109  --   --  109  --  106  --  103  --  103   CO2  --  25  --   --  24  --  26  --  24  --  24   BUN  --  26  --   --  29  --  32*  --  24  --  25   CR  --  0.89  --   --  0.92  --  1.08*  --  1.14*  --  1.14*   ANIONGAP  --  4  --   --  5  --  5  --  7  --  6   JN  --  9.2  --   --  8.7  --  8.4*  --  8.0*  --  8.5   * 105* 100*   < > 100*   < > 139*   < > 102*   < > 108*   ALBUMIN  --   --   --   --   --   --   --   --   --   --  2.7*   PROTTOTAL  --   --   --   --   --   --   --   --   --   --  6.4*   BILITOTAL  --   --   --   --   --   --   --   --   --   --  0.6   ALKPHOS  --   --   --   --   --   --   --   --   --   --  48   ALT  --   --   --   --   --   --   --   --   --   --  23   AST  --   --   --   --   --   --   --   --   --   --  47*    < > = values in this interval not displayed.     No results found for this or any previous visit (from the past 24 hour(s)).

## 2022-05-18 NOTE — PROGRESS NOTES
Care Management Follow Up    Length of Stay (days): 5    Expected Discharge Date: 05/22/2022     Concerns to be Addressed:       Patient plan of care discussed at interdisciplinary rounds: Yes    Anticipated Discharge Disposition:       Anticipated Discharge Services:    Anticipated Discharge DME:      Patient/family educated on Medicare website which has current facility and service quality ratings:    Education Provided on the Discharge Plan:    Patient/Family in Agreement with the Plan: yes    Referrals Placed by CM/SW: External Care Coordination  Private pay costs discussed: Not applicable    Additional Information:  Writer saw that tcu is recommended and patient is covid positive. Writer sent referral via DOD to [facility] (only covid tcu). Ofe texted asking for updated notes, writer faxed these notes to [facility]. [facility]. Writer called Josefa (daughter) and she said she doesn't want patient to go to [facility]. She said she would like patient to go home if possible, but if not, then they are open to patient going to a tcu once patient is covid recovered.       SKYE Gutiérrez

## 2022-05-18 NOTE — PLAN OF CARE
Goal Outcome Evaluation:    Pt A&Ox2-3, VSS, CMS intact, LS diminished in bases, O2 RA, mod carb diet with bg monitoring and insulin, joseph patent, redness to coccyx/periarea/abd folds, antifungal powder applied, up with min assist of 2 and walker to chair, c/o mild knee pain, tylenol and voltaren gel for pain, IV SL, remains in covid precautions, discharge planning.

## 2022-05-18 NOTE — PLAN OF CARE
Goal Outcome Evaluation:  Alert &Orientedx3,calm and cooperative, VSS on RA, CMS intact, right knee pain is managed with tylenol and voltaren gel , LS diminished, infrequent dry non productive cough, up with lift and/or assist of 2 and walker to chair, joseph patent, blanchable redness to periarea cleansed, dried and nystatin powder applied, BG monitoring/insulin, remains in covid precautions, awaiting TCU placement.

## 2022-05-18 NOTE — PLAN OF CARE
Goal Outcome Evaluation:    Pt A&Ox2-3, pleasant and cooperative, VSS, CMS intact, c/o right knee pain, tylenol and voltaren gel for discomfort, LS diminished, dry non productive cough, prn robitussin, up with lift and/or assist of 2 and walker to chair, joseph patent, blanchable redness to periarea with nystatin powder applied, tolerating mod carb diet with bg monitoring/insulin, remains in covid precautions, awaiting TCU placement.

## 2022-05-19 ENCOUNTER — APPOINTMENT (OUTPATIENT)
Dept: PHYSICAL THERAPY | Facility: CLINIC | Age: 87
DRG: 555 | End: 2022-05-19
Payer: MEDICARE

## 2022-05-19 LAB
BACTERIA BLD CULT: NO GROWTH
BACTERIA BLD CULT: NO GROWTH
CREAT SERPL-MCNC: 0.96 MG/DL (ref 0.52–1.04)
GFR SERPL CREATININE-BSD FRML MDRD: 57 ML/MIN/1.73M2
GLUCOSE BLD-MCNC: 99 MG/DL (ref 70–99)
GLUCOSE BLDC GLUCOMTR-MCNC: 107 MG/DL (ref 70–99)
GLUCOSE BLDC GLUCOMTR-MCNC: 113 MG/DL (ref 70–99)
GLUCOSE BLDC GLUCOMTR-MCNC: 161 MG/DL (ref 70–99)
GLUCOSE BLDC GLUCOMTR-MCNC: 165 MG/DL (ref 70–99)
GLUCOSE BLDC GLUCOMTR-MCNC: 186 MG/DL (ref 70–99)
GLUCOSE BLDC GLUCOMTR-MCNC: 201 MG/DL (ref 70–99)
GLUCOSE BLDC GLUCOMTR-MCNC: 36 MG/DL (ref 70–99)
PLATELET # BLD AUTO: 206 10E3/UL (ref 150–450)

## 2022-05-19 PROCEDURE — 36415 COLL VENOUS BLD VENIPUNCTURE: CPT | Performed by: INTERNAL MEDICINE

## 2022-05-19 PROCEDURE — 250N000013 HC RX MED GY IP 250 OP 250 PS 637: Performed by: HOSPITALIST

## 2022-05-19 PROCEDURE — 120N000001 HC R&B MED SURG/OB

## 2022-05-19 PROCEDURE — 250N000011 HC RX IP 250 OP 636: Performed by: INTERNAL MEDICINE

## 2022-05-19 PROCEDURE — 85049 AUTOMATED PLATELET COUNT: CPT | Performed by: INTERNAL MEDICINE

## 2022-05-19 PROCEDURE — 97530 THERAPEUTIC ACTIVITIES: CPT | Mod: GP | Performed by: PHYSICAL THERAPIST

## 2022-05-19 PROCEDURE — 82565 ASSAY OF CREATININE: CPT | Performed by: INTERNAL MEDICINE

## 2022-05-19 PROCEDURE — 82947 ASSAY GLUCOSE BLOOD QUANT: CPT | Performed by: HOSPITALIST

## 2022-05-19 PROCEDURE — 99232 SBSQ HOSP IP/OBS MODERATE 35: CPT | Performed by: HOSPITALIST

## 2022-05-19 RX ORDER — AMLODIPINE BESYLATE 2.5 MG/1
2.5 TABLET ORAL ONCE
Status: COMPLETED | OUTPATIENT
Start: 2022-05-19 | End: 2022-05-19

## 2022-05-19 RX ORDER — AMLODIPINE BESYLATE 5 MG/1
5 TABLET ORAL DAILY
Status: DISCONTINUED | OUTPATIENT
Start: 2022-05-20 | End: 2022-05-23 | Stop reason: HOSPADM

## 2022-05-19 RX ADMIN — AMLODIPINE BESYLATE 2.5 MG: 2.5 TABLET ORAL at 08:58

## 2022-05-19 RX ADMIN — DICLOFENAC SODIUM 2 G: 10 GEL TOPICAL at 15:00

## 2022-05-19 RX ADMIN — MICONAZOLE NITRATE: 20 POWDER TOPICAL at 08:59

## 2022-05-19 RX ADMIN — DICLOFENAC SODIUM 2 G: 10 GEL TOPICAL at 08:59

## 2022-05-19 RX ADMIN — DICLOFENAC SODIUM 2 G: 10 GEL TOPICAL at 17:31

## 2022-05-19 RX ADMIN — BUMETANIDE 0.5 MG: 0.5 TABLET ORAL at 08:58

## 2022-05-19 RX ADMIN — ENOXAPARIN SODIUM 40 MG: 40 INJECTION SUBCUTANEOUS at 17:30

## 2022-05-19 RX ADMIN — CLOPIDOGREL BISULFATE 75 MG: 75 TABLET ORAL at 08:58

## 2022-05-19 RX ADMIN — LISINOPRIL 20 MG: 20 TABLET ORAL at 08:58

## 2022-05-19 RX ADMIN — MICONAZOLE NITRATE: 20 POWDER TOPICAL at 21:34

## 2022-05-19 RX ADMIN — INSULIN ASPART 1 UNITS: 100 INJECTION, SOLUTION INTRAVENOUS; SUBCUTANEOUS at 14:59

## 2022-05-19 RX ADMIN — ACETAMINOPHEN 1000 MG: 500 TABLET, FILM COATED ORAL at 21:31

## 2022-05-19 RX ADMIN — AMLODIPINE BESYLATE 2.5 MG: 2.5 TABLET ORAL at 15:00

## 2022-05-19 RX ADMIN — INSULIN ASPART 1 UNITS: 100 INJECTION, SOLUTION INTRAVENOUS; SUBCUTANEOUS at 17:37

## 2022-05-19 RX ADMIN — DICLOFENAC SODIUM 2 G: 10 GEL TOPICAL at 21:34

## 2022-05-19 ASSESSMENT — ACTIVITIES OF DAILY LIVING (ADL)
ADLS_ACUITY_SCORE: 46
ADLS_ACUITY_SCORE: 47
ADLS_ACUITY_SCORE: 46
ADLS_ACUITY_SCORE: 47
ADLS_ACUITY_SCORE: 46
ADLS_ACUITY_SCORE: 47
ADLS_ACUITY_SCORE: 46
ADLS_ACUITY_SCORE: 46

## 2022-05-19 NOTE — PROGRESS NOTES
Alert and oriented x3, forgetful. VSS on RA. Denies pain. Solorio cath in place and draining well. PIV sl.  Slept well most of the shift. BG at 0200 113.

## 2022-05-19 NOTE — PLAN OF CARE
([id number]) Pt A/O x2-3, forgetful of time during evening shift. VSS ex hypertensive. Denies SOB/chest pain. Assist of 2 w/ gait belt and walker from chair to bed. Adequate output in joseph. Non-productive cough present, redness in bola area. Slowly progressing, awaiting placement once off of Covid precautions. Continue to monitor.

## 2022-05-19 NOTE — PLAN OF CARE
Goal Outcome Evaluation:    Plan of Care Reviewed With: patient   Pt. A&O forgetful at times, vss, up with 2 assist GB and walker, voiding per joseph adequate amount, denied any pain, red bola area meclozine powder applied per order, discharge pending TCU, will continue to monitor.

## 2022-05-19 NOTE — PROGRESS NOTES
[facility]    Medicine Progress Note - Hospitalist Service    Date of Admission:  5/11/2022    Assessment & Plan        Sarah Noble is a 87 year old female with a past medical history of type 2 diabetes, hypertension, CKD stage III, obesity, chronic knee pain and back pain, chronic urinary retention with a indwelling Solorio catheter presents to hospital after fall for knee pain.  She was incidentally COVID positive on admission but without symptoms at that time; she has since had COVID symptoms starting ~ 5/13.        Mild confusion on 5/13, IMPROVED  Mild COVID  Doubt CAUTI   * Screening COVD PCR + on admission (5/11) with clear CXR, and no sx of COVID.  Subsequently developed fever on 5/13; cough and SOB on 05/14/22 > started 3 day course of Remdesivir.  * Family concerned about UTI initially due to confusion (no sx of COVID at that time).   * UA showed only 15 WBC, > 182 RBC AFTER cathter change.   * Started meropenem 5/13/22 for fever prior to development of COVID sx. UCx growing mixed genital drew and with only 15 WBC on UA, doubt infection. Stopped meropenem on 05/15/22.    * CXR clear on 5/13 and 5/14.    * BCx NGTD      Given development of respiratory symptoms, cough, wheezing with O2 of 94%, increased risk of progression > Remdesivir x 3 days on 5/14 - 5/16    Combivent inhalers    Guaifenessen for cough.     On 05/15/22, Last fever, T of 102.4 on 5/14/22 @ 1920. O2 saturations 93% on RA. Hemodynamically stable. WBC normal. DD and CRP elevated.     Started lovenox on 5/13. Follow d-dimer.     Completed 3d short course of remdesivir 5/14 - 5/16 5/16 - 5/19 - Remains afebrile and stable on RA     Bilateral knee pain, L > R   Abnormal MRI of the lumbar spine  Patient with chronic bilateral knee pain. Had right knee pain at home now with worsening left knee pain. Has difficulty ambulating at baseline and uses a walker.  Patient underwent bilateral knee replacements  "approximately 20 years ago. The patient has a history of chronic pain for which she has used opiates in the past, but has been managing with non-opiate medications for the last year, will continue to try to avoid opiates.    * MRI 5/12/22  Severe complex findings, many of which seen on prior MRI in 2017. (See report for further details)  * CT L knee 5/13/22: No evidence of fracture or TKA loosening. Mild soft tissue swelling superior to the knee. Large cheryl protuberance off the inferior patella, may be residual of prior trauma.    * U/S of the legs on 5/13/22 was limited but negative for DVT. Ordered by ortho for evaluation of knee pain in the context of COVID.     Appreciate ortho consult. Ordered MRI as above. Appreciate their input re: interpretation of these results and correlation with knee pain. On exam, pain seems to be localized to knees and worse with movement. No back pain or radicular type pain reported. No sensory deficits. Does okay with straight leg raise and able to lift each leg off the bed and hold for several seconds. Pain induced by flexion and extension of knee L > R. No warmth or overlying skin changes. I am unable to detect clear synovitis due to body habitus.     Per ortho, \"MRI of lumbar spine Iwhile abnormal, does not demonstrate obvious corresponding pathology.\" Agree. No further recommendations regarding abnormalities seen.     CT shows no loosening of prosthesis or occult fracture. Some soft tissue swelling superior to knee and cheryl prominence of inferior patella. ? If that is causing some of her pain. Appreciate ortho's input.     Requires TCU at discharge, as unable to mobilize.      Voltaren gel to bilateral knees, Tylenol as needed    Discharge complicated by COVID positivity - limits TCU options.    5/18 - some improvement with PT - she is hoping to improve enough go home with family support     Elevated CK, resolving   Could be due to covid 19 or due to her sitting for 3 hours " prior to her fall.   CKD stage III Baseline Cr 1.2. Mild elevation.  HTN    Resumed PTA bumex on 05/14/22    Continue PTA amlodipine, lisinopril.     5/19 - fairly persistent BP in 150s systolic - will increase amlodipine to 5mg daily     Covid 19 positive, ASYMPTOMATIC  Asymptomatic. Vaccinated. No indication for intervention. PCR positive 5/12/22   - Covid 19 precautions     H/o CVA, allergic to ASA     Continue on PTA plavix     Type 2 diabetes  Diet-controlled. Well controlled. Last a1c was 6.8%.      MILLIE  Does not use CPAP     Chronic Urinary retention  Chronic Solorio Catheter    As above, doubt CAUTI based on UA and UCx results from 5/13     Normocytic anemia  Chronic, stable.  Patient denies any bleeding.        Diet: Moderate Consistent Carb (60 g CHO per Meal) Diet    DVT Prophylaxis: Enoxaparin (Lovenox) SQ  Solorio Catheter: PRESENT, indication: Retention  Central Lines: None  Cardiac Monitoring: None  Code Status: Full Code      Disposition Plan   Expected Discharge: 05/22/2022     Anticipated discharge location: inpatient rehabilitation facility    Delays:     Placement - TCU            The patient's care was discussed with the Bedside Nurse and Patient. And will call daughter Dyana this afternoon.    Bruce Najera MD  Hospitalist Service  [facility]  Securely message with the [employer] Web Console (learn more here)  Text page via [employer] Paging/Directory         Clinically Significant Risk Factors Present on Admission                    ______________________________________________________________________    Interval History   Seen and examined midday.  Patient sitting upright currently without complaint.  She previously had some mild lightheadedness/dizziness after sitting up from supine position.  No chest pain or palpitations, no shortness of breath, no fevers or chills.  Still coughing at times.  Remained stable on room air.    Data reviewed today: I reviewed all medications,  new labs and imaging results over the last 24 hours. I personally reviewed no images or EKG's today.    Physical Exam   Vital Signs: Temp: 97.7  F (36.5  C) Temp src: Oral BP: (!) 157/85 Pulse: 67   Resp: 16 SpO2: 100 % O2 Device: None (Room air)    Weight: 217 lbs 2.45 oz    Gen: NAD, pleasant, elderly, up in chair again today  HEENT: EOMI, MMM  Resp: no focal crackles,  no wheezes, no increased work of resp  CV: S1S2 heard, reg rhythm, reg rate  Abdo: soft, nontender, nondistended, bowel sounds present  Ext: calves nontender, well perfused  Neuro: [facility], CN grossly intact, no facial asymmetry, standing with walker and therapy at side, answering questions appropriately      Data   Recent Labs   Lab 05/19/22  1222 05/19/22  1032 05/19/22  0853 05/19/22  0834 05/19/22  0740 05/18/22  1151 05/18/22  0850 05/17/22  0814 05/17/22  0757 05/16/22  0921 05/16/22  0748 05/15/22  1222 05/15/22  0717 05/14/22  1157 05/14/22  0823   WBC  --   --   --   --   --   --  3.6*  --  3.4*  --  3.4*  --  5.2  --  5.4   HGB  --   --   --   --   --   --  11.4*  --  11.1*  --  10.8*  --  10.3*  --  11.0*   MCV  --   --   --   --   --   --  92  --  93  --  91  --  94  --  92   PLT  --   --   --   --  206  --  189  --  163  --  153  --  145*  --  155   INR  --   --   --   --   --   --   --   --   --   --   --   --  1.11  --   --    NA  --   --   --   --   --   --  138  --  138  --  137  --  134  --  133   POTASSIUM  --   --   --   --   --   --  3.8  --  4.0  --  4.0  --  3.6  --  3.8   CHLORIDE  --   --   --   --   --   --  109  --  109  --  106  --  103  --  103   CO2  --   --   --   --   --   --  25  --  24  --  26  --  24  --  24   BUN  --   --   --   --   --   --  26  --  29  --  32*  --  24  --  25   CR  --   --   --   --  0.96  --  0.89  --  0.92  --  1.08*  --  1.14*  --  1.14*   ANIONGAP  --   --   --   --   --   --  4  --  5  --  5  --  7  --  6   JN  --   --   --   --   --   --  9.2  --  8.7  --  8.4*  --  8.0*   --  8.5   * 186* 107*   < > 99   < > 105*   < > 100*   < > 139*   < > 102*   < > 108*   ALBUMIN  --   --   --   --   --   --   --   --   --   --   --   --   --   --  2.7*   PROTTOTAL  --   --   --   --   --   --   --   --   --   --   --   --   --   --  6.4*   BILITOTAL  --   --   --   --   --   --   --   --   --   --   --   --   --   --  0.6   ALKPHOS  --   --   --   --   --   --   --   --   --   --   --   --   --   --  48   ALT  --   --   --   --   --   --   --   --   --   --   --   --   --   --  23   AST  --   --   --   --   --   --   --   --   --   --   --   --   --   --  47*    < > = values in this interval not displayed.     No results found for this or any previous visit (from the past 24 hour(s)).

## 2022-05-20 ENCOUNTER — APPOINTMENT (OUTPATIENT)
Dept: PHYSICAL THERAPY | Facility: CLINIC | Age: 87
DRG: 555 | End: 2022-05-20
Payer: MEDICARE

## 2022-05-20 ENCOUNTER — APPOINTMENT (OUTPATIENT)
Dept: OCCUPATIONAL THERAPY | Facility: CLINIC | Age: 87
DRG: 555 | End: 2022-05-20
Payer: MEDICARE

## 2022-05-20 LAB
CRP SERPL-MCNC: 12.1 MG/L (ref 0–8)
ERYTHROCYTE [DISTWIDTH] IN BLOOD BY AUTOMATED COUNT: 12.3 % (ref 10–15)
GLUCOSE BLD-MCNC: 111 MG/DL (ref 70–99)
GLUCOSE BLDC GLUCOMTR-MCNC: 110 MG/DL (ref 70–99)
GLUCOSE BLDC GLUCOMTR-MCNC: 117 MG/DL (ref 70–99)
GLUCOSE BLDC GLUCOMTR-MCNC: 143 MG/DL (ref 70–99)
GLUCOSE BLDC GLUCOMTR-MCNC: 146 MG/DL (ref 70–99)
GLUCOSE BLDC GLUCOMTR-MCNC: 167 MG/DL (ref 70–99)
HCT VFR BLD AUTO: 34.4 % (ref 35–47)
HGB BLD-MCNC: 11.2 G/DL (ref 11.7–15.7)
MCH RBC QN AUTO: 30 PG (ref 26.5–33)
MCHC RBC AUTO-ENTMCNC: 32.6 G/DL (ref 31.5–36.5)
MCV RBC AUTO: 92 FL (ref 78–100)
PLATELET # BLD AUTO: 226 10E3/UL (ref 150–450)
RBC # BLD AUTO: 3.73 10E6/UL (ref 3.8–5.2)
WBC # BLD AUTO: 4.4 10E3/UL (ref 4–11)

## 2022-05-20 PROCEDURE — 86140 C-REACTIVE PROTEIN: CPT | Performed by: HOSPITALIST

## 2022-05-20 PROCEDURE — 99233 SBSQ HOSP IP/OBS HIGH 50: CPT | Performed by: HOSPITALIST

## 2022-05-20 PROCEDURE — 97116 GAIT TRAINING THERAPY: CPT | Mod: GP

## 2022-05-20 PROCEDURE — 250N000013 HC RX MED GY IP 250 OP 250 PS 637: Performed by: HOSPITALIST

## 2022-05-20 PROCEDURE — 120N000001 HC R&B MED SURG/OB

## 2022-05-20 PROCEDURE — 250N000011 HC RX IP 250 OP 636: Performed by: INTERNAL MEDICINE

## 2022-05-20 PROCEDURE — 82947 ASSAY GLUCOSE BLOOD QUANT: CPT | Performed by: HOSPITALIST

## 2022-05-20 PROCEDURE — 97530 THERAPEUTIC ACTIVITIES: CPT | Mod: GP

## 2022-05-20 PROCEDURE — 85027 COMPLETE CBC AUTOMATED: CPT | Performed by: HOSPITALIST

## 2022-05-20 PROCEDURE — 36415 COLL VENOUS BLD VENIPUNCTURE: CPT | Performed by: HOSPITALIST

## 2022-05-20 PROCEDURE — 97535 SELF CARE MNGMENT TRAINING: CPT | Mod: GO

## 2022-05-20 RX ADMIN — ACETAMINOPHEN 1000 MG: 500 TABLET, FILM COATED ORAL at 22:24

## 2022-05-20 RX ADMIN — ENOXAPARIN SODIUM 40 MG: 40 INJECTION SUBCUTANEOUS at 16:55

## 2022-05-20 RX ADMIN — INSULIN ASPART 1 UNITS: 100 INJECTION, SOLUTION INTRAVENOUS; SUBCUTANEOUS at 12:35

## 2022-05-20 RX ADMIN — MICONAZOLE NITRATE: 20 POWDER TOPICAL at 22:24

## 2022-05-20 RX ADMIN — MICONAZOLE NITRATE: 20 POWDER TOPICAL at 08:07

## 2022-05-20 RX ADMIN — INSULIN ASPART 1 UNITS: 100 INJECTION, SOLUTION INTRAVENOUS; SUBCUTANEOUS at 16:54

## 2022-05-20 RX ADMIN — DICLOFENAC SODIUM 2 G: 10 GEL TOPICAL at 17:02

## 2022-05-20 RX ADMIN — BUMETANIDE 0.5 MG: 0.5 TABLET ORAL at 08:07

## 2022-05-20 RX ADMIN — DICLOFENAC SODIUM 2 G: 10 GEL TOPICAL at 22:25

## 2022-05-20 RX ADMIN — DICLOFENAC SODIUM 2 G: 10 GEL TOPICAL at 08:07

## 2022-05-20 RX ADMIN — CLOPIDOGREL BISULFATE 75 MG: 75 TABLET ORAL at 08:07

## 2022-05-20 RX ADMIN — AMLODIPINE BESYLATE 5 MG: 5 TABLET ORAL at 08:13

## 2022-05-20 RX ADMIN — LISINOPRIL 20 MG: 20 TABLET ORAL at 08:07

## 2022-05-20 RX ADMIN — DICLOFENAC SODIUM 2 G: 10 GEL TOPICAL at 12:35

## 2022-05-20 ASSESSMENT — ACTIVITIES OF DAILY LIVING (ADL)
ADLS_ACUITY_SCORE: 44
ADLS_ACUITY_SCORE: 46
ADLS_ACUITY_SCORE: 44
ADLS_ACUITY_SCORE: 46
ADLS_ACUITY_SCORE: 44
ADLS_ACUITY_SCORE: 46
ADLS_ACUITY_SCORE: 44
ADLS_ACUITY_SCORE: 46
ADLS_ACUITY_SCORE: 44

## 2022-05-20 NOTE — PROGRESS NOTES
[facility]    Medicine Progress Note - Hospitalist Service    Date of Admission:  5/11/2022    Assessment & Plan        Sarah Noble is a 87 year old female with a past medical history of type 2 diabetes, hypertension, CKD stage III, obesity, chronic knee pain and back pain, chronic urinary retention with a indwelling Solorio catheter presents to hospital after fall for knee pain.  She was incidentally COVID positive on admission but without symptoms at that time; she has since had COVID symptoms starting ~ 5/13.        Mild confusion on 5/13, IMPROVED  Mild COVID  Doubt CAUTI   * Screening COVD PCR + on admission (5/11) with clear CXR, and no sx of COVID.  Subsequently developed fever on 5/13; cough and SOB on 05/14/22 > started 3 day course of Remdesivir.  * Family concerned about UTI initially due to confusion (no sx of COVID at that time).   * UA showed only 15 WBC, > 182 RBC AFTER cathter change.   * Started meropenem 5/13/22 for fever prior to development of COVID sx. UCx growing mixed genital drew and with only 15 WBC on UA, doubt infection. Stopped meropenem on 05/15/22.    * CXR clear on 5/13 and 5/14.    * BCx NGTD      Given development of respiratory symptoms, cough, wheezing with O2 of 94%, increased risk of progression > Remdesivir x 3 days on 5/14 - 5/16    Combivent inhalers    Guaifenessen for cough.     On 05/15/22, Last fever, T of 102.4 on 5/14/22 @ 1920. O2 saturations 93% on RA. Hemodynamically stable. WBC normal. DD and CRP elevated.     Started lovenox on 5/13. Follow d-dimer.     Completed 3d short course of remdesivir 5/14 - 5/16 5/16 - 5/20 - Remains afebrile and stable on RA     Bilateral knee pain, L > R   Abnormal MRI of the lumbar spine  Patient with chronic bilateral knee pain. Had right knee pain at home now with worsening left knee pain. Has difficulty ambulating at baseline and uses a walker.  Patient underwent bilateral knee replacements  "approximately 20 years ago. The patient has a history of chronic pain for which she has used opiates in the past, but has been managing with non-opiate medications for the last year, will continue to try to avoid opiates.    * MRI 5/12/22  Severe complex findings, many of which seen on prior MRI in 2017. (See report for further details)  * CT L knee 5/13/22: No evidence of fracture or TKA loosening. Mild soft tissue swelling superior to the knee. Large hceryl protuberance off the inferior patella, may be residual of prior trauma.    * U/S of the legs on 5/13/22 was limited but negative for DVT. Ordered by ortho for evaluation of knee pain in the context of COVID.     Appreciate ortho consult. Ordered MRI as above. Appreciate their input re: interpretation of these results and correlation with knee pain. On exam, pain seems to be localized to knees and worse with movement. No back pain or radicular type pain reported. No sensory deficits. Does okay with straight leg raise and able to lift each leg off the bed and hold for several seconds. Pain induced by flexion and extension of knee L > R. No warmth or overlying skin changes. I am unable to detect clear synovitis due to body habitus.     Per ortho, \"MRI of lumbar spine Iwhile abnormal, does not demonstrate obvious corresponding pathology.\" Agree. No further recommendations regarding abnormalities seen.     CT shows no loosening of prosthesis or occult fracture. Some soft tissue swelling superior to knee and cheryl prominence of inferior patella. ? If that is causing some of her pain. Appreciate ortho's input.     Requires TCU at discharge, as unable to mobilize.      Voltaren gel to bilateral knees, Tylenol as needed    Discharge complicated by COVID positivity - limits TCU options.    5/18 - 5/20 - some improvement with PT - she is hoping to improve enough go home with family support     Elevated CK, resolving   Could be due to covid 19 or due to her sitting for 3 " hours prior to her fall.   CKD stage III Baseline Cr 1.2. Mild elevation.  HTN    Resumed PTA bumex on 05/14/22    Continue PTA amlodipine, lisinopril.     5/19 - fairly persistent BP in 150s systolic - will increase amlodipine to 5mg daily    5/20 - BP improved/WNL     Covid 19 positive, ASYMPTOMATIC  Asymptomatic. Vaccinated. No indication for intervention. PCR positive 5/12/22   - Covid 19 precautions     H/o CVA, allergic to ASA     Continue on PTA plavix     Type 2 diabetes  Diet-controlled. Well controlled. Last a1c was 6.8%.      MILLIE  Does not use CPAP     Chronic Urinary retention  Chronic Solorio Catheter    As above, doubt CAUTI based on UA and UCx results from 5/13     Normocytic anemia  Chronic, stable.  Patient denies any bleeding.        Diet: Moderate Consistent Carb (60 g CHO per Meal) Diet    DVT Prophylaxis: Enoxaparin (Lovenox) SQ  Solorio Catheter: PRESENT, indication: Retention  Central Lines: None  Cardiac Monitoring: None  Code Status: Full Code      Disposition Plan   Expected Discharge: 05/22/2022     Anticipated discharge location: inpatient rehabilitation facility    Delays:     Placement - TCU            The patient's care was discussed with the Bedside Nurse, Patient and Patient's Family.    Bruce Najera MD  Hospitalist Service  [facility]  Securely message with the Vocera Web Console (learn more here)  Text page via [employer] Paging/Directory     Total encounter time 36 min with >50% spent in direct patient care, discussion and counseling with patient in room and then daughter on phone with  Updates on deconditioning and need to continue working prior to home discharge (not yet safe, Pat agrees) or TCU in a few days when she is COVID recovered; also coordination of care with staff and RN.    Clinically Significant Risk Factors Present on Admission                    ______________________________________________________________________    Interval History   Seen  and examined midday, up in chair. No change in breathing, no new pain, no fevers. She is wanting to go home, and I attempted to explain that she is not strong enough to move about without assistance of 2. I also explained that staff will walk her as much as possible today.      Data reviewed today: I reviewed all medications, new labs and imaging results over the last 24 hours. I personally reviewed no images or EKG's today.    Physical Exam   Vital Signs: Temp: 98.3  F (36.8  C) Temp src: Oral BP: 115/67 Pulse: 65   Resp: 16 SpO2: 96 % O2 Device: None (Room air)    Weight: 217 lbs 2.45 oz    Gen: NAD, pleasant  HEENT: EOMI, MMM  Resp: no crackles, no wheezes, no increased work of resp  CV: S1S2 heard, reg rhythm, reg rate  Abdo: soft, nontender, nondistended, bowel sounds present  Ext: calves nontender, well perfused  Neuro: aaox self and hospital, CN grossly intact, no facial asymmetry      Data   Recent Labs   Lab 05/20/22  1232 05/20/22  0809 05/20/22  0741 05/19/22  0834 05/19/22  0740 05/18/22  1151 05/18/22  0850 05/17/22  0814 05/17/22  0757 05/16/22  0921 05/16/22  0748 05/15/22  1222 05/15/22  0717 05/14/22  1157 05/14/22  0823   WBC  --   --  4.4  --   --   --  3.6*  --  3.4*  --  3.4*  --  5.2  --  5.4   HGB  --   --  11.2*  --   --   --  11.4*  --  11.1*  --  10.8*  --  10.3*  --  11.0*   MCV  --   --  92  --   --   --  92  --  93  --  91  --  94  --  92   PLT  --   --  226  --  206  --  189  --  163  --  153  --  145*  --  155   INR  --   --   --   --   --   --   --   --   --   --   --   --  1.11  --   --    NA  --   --   --   --   --   --  138  --  138  --  137  --  134  --  133   POTASSIUM  --   --   --   --   --   --  3.8  --  4.0  --  4.0  --  3.6  --  3.8   CHLORIDE  --   --   --   --   --   --  109  --  109  --  106  --  103  --  103   CO2  --   --   --   --   --   --  25  --  24  --  26  --  24  --  24   BUN  --   --   --   --   --   --  26  --  29  --  32*  --  24  --  25   CR  --   --   --    --  0.96  --  0.89  --  0.92  --  1.08*  --  1.14*  --  1.14*   ANIONGAP  --   --   --   --   --   --  4  --  5  --  5  --  7  --  6   JN  --   --   --   --   --   --  9.2  --  8.7  --  8.4*  --  8.0*  --  8.5   * 110* 111*   < > 99   < > 105*   < > 100*   < > 139*   < > 102*   < > 108*   ALBUMIN  --   --   --   --   --   --   --   --   --   --   --   --   --   --  2.7*   PROTTOTAL  --   --   --   --   --   --   --   --   --   --   --   --   --   --  6.4*   BILITOTAL  --   --   --   --   --   --   --   --   --   --   --   --   --   --  0.6   ALKPHOS  --   --   --   --   --   --   --   --   --   --   --   --   --   --  48   ALT  --   --   --   --   --   --   --   --   --   --   --   --   --   --  23   AST  --   --   --   --   --   --   --   --   --   --   --   --   --   --  47*    < > = values in this interval not displayed.     No results found for this or any previous visit (from the past 24 hour(s)).

## 2022-05-20 NOTE — PLAN OF CARE
Goal Outcome Evaluation:      patient A&Ox3, forgetful @ times. Up with A2 & walker. Denies pain. VSS on RA. Denies SOB. Non productive cough. Redness in bola area.Solorio inplace with adequate out put. Awaiting placement once off Covid precaution .

## 2022-05-20 NOTE — PROGRESS NOTES
"SPIRITUAL HEALTH SERVICES: Tele-Encounter  Patient Location: 23  Spoke with: Ptnt Sarah    Referral Source:  Consult - emotional support    DATA:  I rang Sarah at her bedside phone. She said she would like to talk but \"not now.\" I offered a follow up call tomorrow, which she welcomed.    PLAN:  I've triaged for On-Call  to follow up tomorrow.      Rev Salma Varner  Associate   Spiritual Health Phone Line [phone]  Spiritual Health Pager [phone]  ______________________________    Type of service:  Telephone Visit     has received verbal consent for a TelephoneVisit from the patient? Yes    Distance Provider Location: designated [employer] office or home office (secure setting)    Mode of Communication: telephone (via [employer]se phone or [employer] tele-call-number ([phone]))      "

## 2022-05-20 NOTE — PLAN OF CARE
Goal Outcome Evaluation:  Pt A&Ox4. CMS intact. VSS. Up w/ A2 walker GB to chair. Taking tylenol and Voltaren cream for pain. Voiding adequately via joseph catheter. BMx1 this shift. Redness to bola area. Continue to monitor.

## 2022-05-21 LAB
GLUCOSE BLDC GLUCOMTR-MCNC: 109 MG/DL (ref 70–99)
GLUCOSE BLDC GLUCOMTR-MCNC: 117 MG/DL (ref 70–99)
GLUCOSE BLDC GLUCOMTR-MCNC: 121 MG/DL (ref 70–99)
GLUCOSE BLDC GLUCOMTR-MCNC: 132 MG/DL (ref 70–99)
GLUCOSE BLDC GLUCOMTR-MCNC: 136 MG/DL (ref 70–99)

## 2022-05-21 PROCEDURE — 250N000011 HC RX IP 250 OP 636: Performed by: INTERNAL MEDICINE

## 2022-05-21 PROCEDURE — 99232 SBSQ HOSP IP/OBS MODERATE 35: CPT | Performed by: HOSPITALIST

## 2022-05-21 PROCEDURE — 120N000001 HC R&B MED SURG/OB

## 2022-05-21 PROCEDURE — 250N000013 HC RX MED GY IP 250 OP 250 PS 637: Performed by: HOSPITALIST

## 2022-05-21 RX ADMIN — DICLOFENAC SODIUM 2 G: 10 GEL TOPICAL at 12:53

## 2022-05-21 RX ADMIN — LISINOPRIL 20 MG: 20 TABLET ORAL at 09:01

## 2022-05-21 RX ADMIN — MICONAZOLE NITRATE: 20 POWDER TOPICAL at 20:59

## 2022-05-21 RX ADMIN — ENOXAPARIN SODIUM 40 MG: 40 INJECTION SUBCUTANEOUS at 16:12

## 2022-05-21 RX ADMIN — BUMETANIDE 0.5 MG: 0.5 TABLET ORAL at 09:01

## 2022-05-21 RX ADMIN — MICONAZOLE NITRATE: 20 POWDER TOPICAL at 09:05

## 2022-05-21 RX ADMIN — DICLOFENAC SODIUM 2 G: 10 GEL TOPICAL at 20:56

## 2022-05-21 RX ADMIN — AMLODIPINE BESYLATE 5 MG: 5 TABLET ORAL at 09:01

## 2022-05-21 RX ADMIN — CLOPIDOGREL BISULFATE 75 MG: 75 TABLET ORAL at 09:01

## 2022-05-21 RX ADMIN — ACETAMINOPHEN 1000 MG: 500 TABLET, FILM COATED ORAL at 22:19

## 2022-05-21 RX ADMIN — DICLOFENAC SODIUM 2 G: 10 GEL TOPICAL at 22:20

## 2022-05-21 RX ADMIN — DICLOFENAC SODIUM 2 G: 10 GEL TOPICAL at 09:50

## 2022-05-21 ASSESSMENT — ACTIVITIES OF DAILY LIVING (ADL)
ADLS_ACUITY_SCORE: 46
ADLS_ACUITY_SCORE: 44
ADLS_ACUITY_SCORE: 46
ADLS_ACUITY_SCORE: 44

## 2022-05-21 NOTE — PROGRESS NOTES
"SPIRITUAL HEALTH SERVICES Progress Note    ORTHO 2310-01    Pt request for  support; follow-up to initial visit. Pt treated as covid +.      phone call visit with pt. Pt highly receptive, talkative.     DATA:   Pt described feeling very sad and lonely, though with no particular sources of distress beyond feeling \"lonely and sad.\" Pt presented with no immediate distress, but very open to share at length about her entire life, reminiscing at length about her life from birth, childhood, early love and marriage, raising her 4 daughters, and emerging into the later years of her life. Pt acknowledges being Anglican, and presented with no specific Evangelical needs at this time.     PLAN:    team will continue to follow pt for emotional support as she remains in hospital.     Chaka King M.Div.  Staff   Pager [phone]   "

## 2022-05-21 NOTE — PROGRESS NOTES
Alert and oriented x 3-4, forgetful. VSS, room air. Denied SOB or any discomfort. CMS intact. Denied pain, no PRN meds given. In bed all shift, turns independently in bed with cues. Solorio patent, 300 ml pale yellow clear o/p this shift. Isolation precautions  maintained.

## 2022-05-21 NOTE — PLAN OF CARE
Goal Outcome Evaluation:    Plan of Care Reviewed With: patient     Overall Patient Progress: improving    Patient vital signs are at baseline: Yes  Patient able to ambulate as they were prior to admission or with assist devices provided by therapies during their stay:  Yes  Patient MUST void prior to discharge:  Yes  Patient able to tolerate oral intake:  Yes  Pain has adequate pain control using Oral analgesics:  Yes      A&O x4.   CMS Intact.   VSS on RA.   Up with Ax2 GB and walker for short distances.   Voiding via Solorio.   Pain controlled w/ Voltaren gel to knees.   Continue to monitor.

## 2022-05-21 NOTE — PROGRESS NOTES
[facility]    Medicine Progress Note - Hospitalist Service    Date of Admission:  5/11/2022    Assessment & Plan        Sarah Noble is a 87 year old female with a past medical history of type 2 diabetes, hypertension, CKD stage III, obesity, chronic knee pain and back pain, chronic urinary retention with a indwelling Solorio catheter presents to hospital after fall for knee pain.  She was incidentally COVID positive on admission but without symptoms at that time; she has since had COVID symptoms starting ~ 5/13.        Mild confusion on 5/13, IMPROVED  Mild COVID  Doubt CAUTI   * Screening COVD PCR + on admission (5/11) with clear CXR, and no sx of COVID.  Subsequently developed fever on 5/13; cough and SOB on 05/14/22 > started 3 day course of Remdesivir.  * Family concerned about UTI initially due to confusion (no sx of COVID at that time).   * UA showed only 15 WBC, > 182 RBC AFTER cathter change.   * Started meropenem 5/13/22 for fever prior to development of COVID sx. UCx growing mixed genital drew and with only 15 WBC on UA, doubt infection. Stopped meropenem on 05/15/22.    * CXR clear on 5/13 and 5/14.    * BCx NGTD      Given development of respiratory symptoms, cough, wheezing with O2 of 94%, increased risk of progression > Remdesivir x 3 days on 5/14 - 5/16    Combivent inhalers    Guaifenessen for cough.     On 05/15/22, Last fever, T of 102.4 on 5/14/22 @ 1920. O2 saturations 93% on RA. Hemodynamically stable. WBC normal. DD and CRP elevated.     Started lovenox on 5/13. Follow d-dimer.     Completed 3d short course of remdesivir 5/14 - 5/16 5/16 - 5/21 - Remains afebrile and stable on RA     Bilateral knee pain, L > R   Abnormal MRI of the lumbar spine  Patient with chronic bilateral knee pain. Had right knee pain at home now with worsening left knee pain. Has difficulty ambulating at baseline and uses a walker.  Patient underwent bilateral knee replacements  "approximately 20 years ago. The patient has a history of chronic pain for which she has used opiates in the past, but has been managing with non-opiate medications for the last year, will continue to try to avoid opiates.    * MRI 5/12/22  Severe complex findings, many of which seen on prior MRI in 2017. (See report for further details)  * CT L knee 5/13/22: No evidence of fracture or TKA loosening. Mild soft tissue swelling superior to the knee. Large cheryl protuberance off the inferior patella, may be residual of prior trauma.    * U/S of the legs on 5/13/22 was limited but negative for DVT. Ordered by ortho for evaluation of knee pain in the context of COVID.     Appreciate ortho consult. Ordered MRI as above. Appreciate their input re: interpretation of these results and correlation with knee pain. On exam, pain seems to be localized to knees and worse with movement. No back pain or radicular type pain reported. No sensory deficits. Does okay with straight leg raise and able to lift each leg off the bed and hold for several seconds. Pain induced by flexion and extension of knee L > R. No warmth or overlying skin changes. I am unable to detect clear synovitis due to body habitus.     Per ortho, \"MRI of lumbar spine Iwhile abnormal, does not demonstrate obvious corresponding pathology.\" Agree. No further recommendations regarding abnormalities seen.     CT shows no loosening of prosthesis or occult fracture. Some soft tissue swelling superior to knee and cheryl prominence of inferior patella. ? If that is causing some of her pain. Appreciate ortho's input.     Requires TCU at discharge, as unable to mobilize.      Voltaren gel to bilateral knees, Tylenol as needed    Discharge complicated by COVID positivity - limits TCU options.    5/18 - 5/21 - some improvement with PT - she is hoping to improve enough go home with family support     Elevated CK, resolving   Could be due to covid 19 or due to her sitting for 3 " hours prior to her fall.   CKD stage III Baseline Cr 1.2. Mild elevation.  HTN    Resumed PTA bumex on 05/14/22    Continue PTA amlodipine, lisinopril.     5/19 - fairly persistent BP in 150s systolic - will increase amlodipine to 5mg daily    5/20 - BP improved/WNL     Covid 19 positive, ASYMPTOMATIC  Asymptomatic. Vaccinated. No indication for intervention. PCR positive 5/12/22   - Covid 19 precautions     H/o CVA, allergic to ASA     Continue on PTA plavix     Type 2 diabetes  Diet-controlled. Well controlled. Last a1c was 6.8%.      MILLIE  Does not use CPAP     Chronic Urinary retention  Chronic Solorio Catheter    As above, doubt CAUTI based on UA and UCx results from 5/13     Normocytic anemia  Chronic, stable.  Patient denies any bleeding.        Diet: Moderate Consistent Carb (60 g CHO per Meal) Diet    DVT Prophylaxis: Pneumatic Compression Devices  Solorio Catheter: PRESENT, indication: Retention  Central Lines: None  Cardiac Monitoring: None  Code Status: Full Code      Disposition Plan   Expected Discharge: 05/23/2022     Anticipated discharge location: inpatient rehabilitation facility    Delays:     Placement - TCU            The patient's care was discussed with the Bedside Nurse and Patient.    Bruce Najera MD  Hospitalist Service  [facility]  Securely message with the Vocera Web Console (learn more here)  Text page via [employer] Paging/Directory         Clinically Significant Risk Factors Present on Admission                    ______________________________________________________________________    Interval History   Patient seen and examined this morning.  She is awake and without new complaints.  No fevers or shortness of breath.  Some cough still remains.  Again we discussed that we will need to work on conditioning and strengthening before she can safely be home.  Discussed with RN and encouraging as much activity as we can.    Data reviewed today: I reviewed all  medications, new labs and imaging results over the last 24 hours. I personally reviewed no images or EKG's today.    Physical Exam   Vital Signs: Temp: 97.6  F (36.4  C) Temp src: Oral BP: (!) 143/68 Pulse: 69   Resp: 16 SpO2: 98 % O2 Device: None (Room air)    Weight: 217 lbs 2.45 oz    Gen: NAD, pleasant  HEENT: EOMI, MMM  Resp: no focal crackles,  no wheezes, no increased work of resp  CV: S1S2 heard, reg rhythm, reg rate  Abdo: soft, nontender, nondistended, bowel sounds present  Ext: calves nontender, well perfused  Neuro: awake and alert, oriented to self and situation, CN grossly intact, no facial asymmetry      Data   Recent Labs   Lab 05/21/22  1231 05/21/22  0841 05/21/22  0146 05/20/22  0809 05/20/22  0741 05/19/22  0834 05/19/22  0740 05/18/22  1151 05/18/22  0850 05/17/22  0814 05/17/22  0757 05/16/22  0921 05/16/22  0748 05/15/22  1222 05/15/22  0717   WBC  --   --   --   --  4.4  --   --   --  3.6*  --  3.4*  --  3.4*  --  5.2   HGB  --   --   --   --  11.2*  --   --   --  11.4*  --  11.1*  --  10.8*  --  10.3*   MCV  --   --   --   --  92  --   --   --  92  --  93  --  91  --  94   PLT  --   --   --   --  226  --  206  --  189  --  163  --  153  --  145*   INR  --   --   --   --   --   --   --   --   --   --   --   --   --   --  1.11   NA  --   --   --   --   --   --   --   --  138  --  138  --  137  --  134   POTASSIUM  --   --   --   --   --   --   --   --  3.8  --  4.0  --  4.0  --  3.6   CHLORIDE  --   --   --   --   --   --   --   --  109  --  109  --  106  --  103   CO2  --   --   --   --   --   --   --   --  25  --  24  --  26  --  24   BUN  --   --   --   --   --   --   --   --  26  --  29  --  32*  --  24   CR  --   --   --   --   --   --  0.96  --  0.89  --  0.92  --  1.08*  --  1.14*   ANIONGAP  --   --   --   --   --   --   --   --  4  --  5  --  5  --  7   JN  --   --   --   --   --   --   --   --  9.2  --  8.7  --  8.4*  --  8.0*   * 109* 117*   < > 111*   < > 99   < > 105*   <  > 100*   < > 139*   < > 102*    < > = values in this interval not displayed.     No results found for this or any previous visit (from the past 24 hour(s)).

## 2022-05-21 NOTE — PLAN OF CARE
Goal Outcome Evaluation:    A&Ox4. VSS, room air stating at 98%. CMS intact with ex general weakness of the BLE. Denied pain and SOB. Airborne precautions maintained. Assist of 1 GB/W, up on the edge of the bed, did walk 5 steps in the room and c/o of shakiness/weakness of MD NEWTON requesting for Pt to be ambulate if possible.

## 2022-05-21 NOTE — PLAN OF CARE
Goal Outcome Evaluation:      Date/Time: 5/21 3-11:30 shift    Trauma/Ortho/Medical (Choose one) Medical    Diagnosis: COVID, knee and back pain  POD#: N/A, admitted 10 days  Mental Status: A&O x4  Activity/dangle: Up with Ax2 with gait belt and walker, encouraged to walk, very afraid of falling. Took a couple steps forward and was shaky and wanted to go back to bed. States   It is more fear than pain.   Diet: Mod consistent carb  Pain: Denies  Solorio/Voiding: Solorio, chronic for retention  Tele/Restraints/Iso: On special  isolation precautions for COVID  02/LDA: No  D/C Date: Estimated 2 days pending TCU placement

## 2022-05-22 ENCOUNTER — APPOINTMENT (OUTPATIENT)
Dept: OCCUPATIONAL THERAPY | Facility: CLINIC | Age: 87
DRG: 555 | End: 2022-05-22
Payer: MEDICARE

## 2022-05-22 LAB
CREAT SERPL-MCNC: 1.14 MG/DL (ref 0.52–1.04)
GFR SERPL CREATININE-BSD FRML MDRD: 46 ML/MIN/1.73M2
GLUCOSE BLDC GLUCOMTR-MCNC: 101 MG/DL (ref 70–99)
GLUCOSE BLDC GLUCOMTR-MCNC: 201 MG/DL (ref 70–99)
GLUCOSE BLDC GLUCOMTR-MCNC: 201 MG/DL (ref 70–99)
GLUCOSE BLDC GLUCOMTR-MCNC: 90 MG/DL (ref 70–99)
GLUCOSE BLDC GLUCOMTR-MCNC: 95 MG/DL (ref 70–99)
PLATELET # BLD AUTO: 250 10E3/UL (ref 150–450)

## 2022-05-22 PROCEDURE — 120N000001 HC R&B MED SURG/OB

## 2022-05-22 PROCEDURE — 250N000011 HC RX IP 250 OP 636: Performed by: INTERNAL MEDICINE

## 2022-05-22 PROCEDURE — 99232 SBSQ HOSP IP/OBS MODERATE 35: CPT | Performed by: HOSPITALIST

## 2022-05-22 PROCEDURE — 250N000013 HC RX MED GY IP 250 OP 250 PS 637: Performed by: INTERNAL MEDICINE

## 2022-05-22 PROCEDURE — 82565 ASSAY OF CREATININE: CPT | Performed by: INTERNAL MEDICINE

## 2022-05-22 PROCEDURE — 250N000013 HC RX MED GY IP 250 OP 250 PS 637: Performed by: HOSPITALIST

## 2022-05-22 PROCEDURE — 97110 THERAPEUTIC EXERCISES: CPT | Mod: GO | Performed by: OCCUPATIONAL THERAPIST

## 2022-05-22 PROCEDURE — 85049 AUTOMATED PLATELET COUNT: CPT | Performed by: INTERNAL MEDICINE

## 2022-05-22 PROCEDURE — 97530 THERAPEUTIC ACTIVITIES: CPT | Mod: GO | Performed by: OCCUPATIONAL THERAPIST

## 2022-05-22 PROCEDURE — 36415 COLL VENOUS BLD VENIPUNCTURE: CPT | Performed by: INTERNAL MEDICINE

## 2022-05-22 RX ADMIN — DICLOFENAC SODIUM 2 G: 10 GEL TOPICAL at 08:54

## 2022-05-22 RX ADMIN — MICONAZOLE NITRATE: 20 POWDER TOPICAL at 08:55

## 2022-05-22 RX ADMIN — LISINOPRIL 20 MG: 20 TABLET ORAL at 08:54

## 2022-05-22 RX ADMIN — ACETAMINOPHEN 650 MG: 325 TABLET ORAL at 08:54

## 2022-05-22 RX ADMIN — DICLOFENAC SODIUM 2 G: 10 GEL TOPICAL at 14:00

## 2022-05-22 RX ADMIN — BUMETANIDE 0.5 MG: 0.5 TABLET ORAL at 08:54

## 2022-05-22 RX ADMIN — CLOPIDOGREL BISULFATE 75 MG: 75 TABLET ORAL at 08:54

## 2022-05-22 RX ADMIN — AMLODIPINE BESYLATE 5 MG: 5 TABLET ORAL at 08:54

## 2022-05-22 RX ADMIN — ACETAMINOPHEN 1000 MG: 500 TABLET, FILM COATED ORAL at 21:21

## 2022-05-22 RX ADMIN — ENOXAPARIN SODIUM 40 MG: 40 INJECTION SUBCUTANEOUS at 16:41

## 2022-05-22 RX ADMIN — BENZONATATE 100 MG: 100 CAPSULE ORAL at 08:55

## 2022-05-22 RX ADMIN — DICLOFENAC SODIUM 2 G: 10 GEL TOPICAL at 18:28

## 2022-05-22 RX ADMIN — INSULIN ASPART 1 UNITS: 100 INJECTION, SOLUTION INTRAVENOUS; SUBCUTANEOUS at 18:27

## 2022-05-22 RX ADMIN — MICONAZOLE NITRATE: 20 POWDER TOPICAL at 21:23

## 2022-05-22 RX ADMIN — DICLOFENAC SODIUM 2 G: 10 GEL TOPICAL at 21:21

## 2022-05-22 ASSESSMENT — ACTIVITIES OF DAILY LIVING (ADL)
ADLS_ACUITY_SCORE: 46

## 2022-05-22 NOTE — PLAN OF CARE
Goal Outcome Evaluation:    Overall Patient Progress: improving     Date/Time: 5/22 3-7:30 shift     Trauma/Ortho/Medical (Choose one) Medical     Diagnosis: COVID, knee and back pain  POD#: N/A, admitted 11 days  Mental Status: A&O x4  Activity/dangle: Up with Ax2 with gait belt and walker, was up in chair most of day, up to get back to bed. A little steadier  than yesterday  but still shaky and  fearful of falling  and ambulating triggers cough.  Diet: Mod consistent carb  Pain: Denies  Solorio/Voiding: Solorio, chronic for retention  Tele/Restraints/Iso: On special  isolation precautions for COVID  02/LDA: No  D/C Date: Estimated 1 day pending TCU placement

## 2022-05-22 NOTE — PROGRESS NOTES
Care Management Follow Up    Length of Stay (days): 9    Expected Discharge Date: 05/23/2022     Concerns to be Addressed:       Patient plan of care discussed at interdisciplinary rounds: Yes    Anticipated Discharge Disposition:       Anticipated Discharge Services:    Anticipated Discharge DME:      Patient/family educated on Medicare website which has current facility and service quality ratings:    Education Provided on the Discharge Plan:    Patient/Family in Agreement with the Plan: yes    Referrals Placed by CM/SW: External Care Coordination  Private pay costs discussed: Not applicable    Additional Information:  SW spoke with patient's daughter Josefa to discuss discharge planning. Per Josefa, the goal is for patient to discharge to TCU in [facility] if she is not able to ambulate on her. Reviewed therapy notes with patient's daughter and she is in agreement with TCU at discharge. Per request, sent referral via [facility] to [facility], and [facility]. Will continue to follow.       SKYE Samaniego

## 2022-05-22 NOTE — PROGRESS NOTES
"SPIRITUAL HEALTH SERVICES Progress Note  [facility]  Ortho    Visited with PT by telephone as follow -up from previous on call visit from  (05/21/2022).    PT reports that she is \"feeling better\" today and not as lonely and sad as over the past several days. PT states that she is waiting for her lunch and that her appetite has improved somewhat. She states that she is looking forward to returning home where she will be assisted by her daughter but is uncertain about when that will be. She states that she has no other sources of distress or spiritual or Christianity needs at this time.    Follow up with PT while she remains in hospital.    Pelon Michelle  Associate      "

## 2022-05-22 NOTE — PROGRESS NOTES
[facility]    Medicine Progress Note - Hospitalist Service    Date of Admission:  5/11/2022    Assessment & Plan        Sarah Noble is a 87 year old female with a past medical history of type 2 diabetes, hypertension, CKD stage III, obesity, chronic knee pain and back pain, chronic urinary retention with a indwelling Solorio catheter presents to hospital after fall for knee pain.  She was incidentally COVID positive on admission but without symptoms at that time; she has since had COVID symptoms starting ~ 5/13.        Mild confusion on 5/13, IMPROVED  Mild COVID  Doubt CAUTI   * Screening COVD PCR + on admission (5/11) with clear CXR, and no sx of COVID.  Subsequently developed fever on 5/13; cough and SOB on 05/14/22 > started 3 day course of Remdesivir.  * Family concerned about UTI initially due to confusion (no sx of COVID at that time).   * UA showed only 15 WBC, > 182 RBC AFTER cathter change.   * Started meropenem 5/13/22 for fever prior to development of COVID sx. UCx growing mixed genital drew and with only 15 WBC on UA, doubt infection. Stopped meropenem on 05/15/22.    * CXR clear on 5/13 and 5/14.    * BCx NGTD      Given development of respiratory symptoms, cough, wheezing with O2 of 94%, increased risk of progression > Remdesivir x 3 days on 5/14 - 5/16    Combivent inhalers    Guaifenessen for cough.     On 05/15/22, Last fever, T of 102.4 on 5/14/22 @ 1920. O2 saturations 93% on RA. Hemodynamically stable. WBC normal. DD and CRP elevated.     Started lovenox on 5/13. Follow d-dimer.     Completed 3d short course of remdesivir 5/14 - 5/16 5/16 - 5/22 - Remains afebrile and stable on RA     Bilateral knee pain, L > R   Abnormal MRI of the lumbar spine  Patient with chronic bilateral knee pain. Had right knee pain at home now with worsening left knee pain. Has difficulty ambulating at baseline and uses a walker.  Patient underwent bilateral knee replacements  "approximately 20 years ago. The patient has a history of chronic pain for which she has used opiates in the past, but has been managing with non-opiate medications for the last year, will continue to try to avoid opiates.    * MRI 5/12/22  Severe complex findings, many of which seen on prior MRI in 2017. (See report for further details)  * CT L knee 5/13/22: No evidence of fracture or TKA loosening. Mild soft tissue swelling superior to the knee. Large cheryl protuberance off the inferior patella, may be residual of prior trauma.    * U/S of the legs on 5/13/22 was limited but negative for DVT. Ordered by ortho for evaluation of knee pain in the context of COVID.     Appreciate ortho consult. Ordered MRI as above. Appreciate their input re: interpretation of these results and correlation with knee pain. On exam, pain seems to be localized to knees and worse with movement. No back pain or radicular type pain reported. No sensory deficits. Does okay with straight leg raise and able to lift each leg off the bed and hold for several seconds. Pain induced by flexion and extension of knee L > R. No warmth or overlying skin changes. I am unable to detect clear synovitis due to body habitus.     Per ortho, \"MRI of lumbar spine Iwhile abnormal, does not demonstrate obvious corresponding pathology.\" Agree. No further recommendations regarding abnormalities seen.     CT shows no loosening of prosthesis or occult fracture. Some soft tissue swelling superior to knee and cheryl prominence of inferior patella. ? If that is causing some of her pain. Appreciate ortho's input.     Requires TCU at discharge, as unable to mobilize.      Voltaren gel to bilateral knees, Tylenol as needed    Discharge complicated by COVID positivity - limits TCU options.    5/18 - 5/22 - some improvement with PT - she is hoping to improve enough go home with family support     Elevated CK, resolving   Could be due to covid 19 or due to her sitting for 3 " hours prior to her fall.   CKD stage III Baseline Cr 1.2. Mild elevation.  HTN    Resumed PTA bumex on 05/14/22    Continue PTA amlodipine, lisinopril.     5/19 - fairly persistent BP in 150s systolic - will increase amlodipine to 5mg daily    5/20 - BP improved/WNL and remains at goal thru 5/22     Covid 19 positive, ASYMPTOMATIC  Asymptomatic. Vaccinated. No indication for intervention. PCR positive 5/12/22   - Covid 19 precautions     H/o CVA, allergic to ASA     Continue on PTA plavix     Type 2 diabetes  Diet-controlled. Well controlled. Last a1c was 6.8%.      MILLIE  Does not use CPAP     Chronic Urinary retention  Chronic Solorio Catheter    As above, doubt CAUTI based on UA and UCx results from 5/13     Normocytic anemia  Chronic, stable.  Patient denies any bleeding.        Diet: Moderate Consistent Carb (60 g CHO per Meal) Diet    DVT Prophylaxis: Pneumatic Compression Devices  Solorio Catheter: PRESENT, indication: Retention  Central Lines: None  Cardiac Monitoring: None  Code Status: Full Code      Disposition Plan   Expected Discharge: 05/23/2022     Anticipated discharge location: inpatient rehabilitation facility    Delays:     Placement - TCU            The patient's care was discussed with the Bedside Nurse, Patient and Patient's Family over the phone.    Bruce Najera MD  Hospitalist Service  [facility]  Securely message with the Vocera Web Console (learn more here)  Text page via [employer] Paging/Directory         Clinically Significant Risk Factors Present on Admission                    ______________________________________________________________________    Interval History   Seen and examined earlier today. Up in chair. Doing well and feeling better, no fevers, or sob. No new pain. Patient awaiting TCU for re-conditioning.    Data reviewed today: I reviewed all medications, new labs and imaging results over the last 24 hours. I personally reviewed no images or EKG's  today.    Physical Exam   Vital Signs: Temp: 97.5  F (36.4  C) Temp src: Axillary BP: 126/60 Pulse: 79   Resp: 18 SpO2: 98 % O2 Device: None (Room air)    Weight: 217 lbs 2.45 oz    Gen: NAD, pleasant  HEENT: EOMI, MMM  Resp: no crackles,  no wheezes, no increased work of resp  CV: S1S2 heard, reg rhythm, reg rate  Abdo: soft, nontender, nondistended, bowel sounds present  Ext: calves nontender, well perfused  Neuro: aao self, hospital, and situation, CN grossly intact, no facial asymmetry      Data   Recent Labs   Lab 05/22/22  1400 05/22/22  0817 05/22/22  0713 05/22/22  0221 05/20/22  0809 05/20/22  0741 05/19/22  0834 05/19/22  0740 05/18/22  1151 05/18/22  0850 05/17/22  0814 05/17/22  0757 05/16/22  0921 05/16/22  0748   WBC  --   --   --   --   --  4.4  --   --   --  3.6*  --  3.4*  --  3.4*   HGB  --   --   --   --   --  11.2*  --   --   --  11.4*  --  11.1*  --  10.8*   MCV  --   --   --   --   --  92  --   --   --  92  --  93  --  91   PLT  --   --  250  --   --  226  --  206  --  189  --  163  --  153   NA  --   --   --   --   --   --   --   --   --  138  --  138  --  137   POTASSIUM  --   --   --   --   --   --   --   --   --  3.8  --  4.0  --  4.0   CHLORIDE  --   --   --   --   --   --   --   --   --  109  --  109  --  106   CO2  --   --   --   --   --   --   --   --   --  25  --  24  --  26   BUN  --   --   --   --   --   --   --   --   --  26  --  29  --  32*   CR  --   --  1.14*  --   --   --   --  0.96  --  0.89  --  0.92  --  1.08*   ANIONGAP  --   --   --   --   --   --   --   --   --  4  --  5  --  5   JN  --   --   --   --   --   --   --   --   --  9.2  --  8.7  --  8.4*   * 95  --  90   < > 111*   < > 99   < > 105*   < > 100*   < > 139*    < > = values in this interval not displayed.     No results found for this or any previous visit (from the past 24 hour(s)).

## 2022-05-22 NOTE — PLAN OF CARE
Mental Status: A&O x4  Activity/dangle: Up with Ax2 with gait belt and walker, up in chair, tolerating well  Diet: Mod consistent carb  Pain: Denies  Solorio/Voiding: Solorio, chronic for retention  Tele/Restraints/Iso: On special  isolation precautions for COVID  02/LDA: Solorio  D/C Date: pending TCU placement

## 2022-05-23 ENCOUNTER — APPOINTMENT (OUTPATIENT)
Dept: PHYSICAL THERAPY | Facility: CLINIC | Age: 87
DRG: 555 | End: 2022-05-23
Payer: MEDICARE

## 2022-05-23 ENCOUNTER — LAB REQUISITION (OUTPATIENT)
Dept: LAB | Facility: CLINIC | Age: 87
End: 2022-05-23

## 2022-05-23 VITALS
HEART RATE: 78 BPM | SYSTOLIC BLOOD PRESSURE: 138 MMHG | WEIGHT: 211.42 LBS | BODY MASS INDEX: 36.09 KG/M2 | HEIGHT: 64 IN | RESPIRATION RATE: 18 BRPM | DIASTOLIC BLOOD PRESSURE: 64 MMHG | TEMPERATURE: 98.2 F | OXYGEN SATURATION: 100 %

## 2022-05-23 VITALS
OXYGEN SATURATION: 99 % | SYSTOLIC BLOOD PRESSURE: 139 MMHG | HEART RATE: 81 BPM | TEMPERATURE: 98 F | RESPIRATION RATE: 18 BRPM | DIASTOLIC BLOOD PRESSURE: 85 MMHG

## 2022-05-23 DIAGNOSIS — Z00.01 ENCOUNTER FOR GENERAL ADULT MEDICAL EXAMINATION WITH ABNORMAL FINDINGS: ICD-10-CM

## 2022-05-23 PROBLEM — R05.9 COUGH: Status: ACTIVE | Noted: 2022-05-23

## 2022-05-23 PROBLEM — R52 ACHES: Status: ACTIVE | Noted: 2022-05-23

## 2022-05-23 PROBLEM — L29.9 ITCHING: Status: ACTIVE | Noted: 2022-05-23

## 2022-05-23 PROBLEM — I10 BENIGN ESSENTIAL HYPERTENSION: Status: ACTIVE | Noted: 2022-05-23

## 2022-05-23 LAB
GLUCOSE BLDC GLUCOMTR-MCNC: 191 MG/DL (ref 70–99)
GLUCOSE BLDC GLUCOMTR-MCNC: 97 MG/DL (ref 70–99)

## 2022-05-23 PROCEDURE — 250N000011 HC RX IP 250 OP 636: Performed by: INTERNAL MEDICINE

## 2022-05-23 PROCEDURE — 99239 HOSP IP/OBS DSCHRG MGMT >30: CPT | Performed by: HOSPITALIST

## 2022-05-23 PROCEDURE — 250N000013 HC RX MED GY IP 250 OP 250 PS 637: Performed by: HOSPITALIST

## 2022-05-23 PROCEDURE — 97530 THERAPEUTIC ACTIVITIES: CPT | Mod: GP | Performed by: PHYSICAL THERAPIST

## 2022-05-23 RX ORDER — ALBUTEROL SULFATE 90 UG/1
2 AEROSOL, METERED RESPIRATORY (INHALATION) EVERY 4 HOURS PRN
Qty: 18 G | DISCHARGE
Start: 2022-05-23 | End: 2023-01-01

## 2022-05-23 RX ORDER — BENZONATATE 100 MG/1
100 CAPSULE ORAL 3 TIMES DAILY PRN
DISCHARGE
Start: 2022-05-23 | End: 2022-06-09

## 2022-05-23 RX ORDER — AMLODIPINE BESYLATE 5 MG/1
5 TABLET ORAL DAILY
DISCHARGE
Start: 2022-05-24 | End: 2022-06-03

## 2022-05-23 RX ADMIN — ENOXAPARIN SODIUM 40 MG: 40 INJECTION SUBCUTANEOUS at 14:19

## 2022-05-23 RX ADMIN — CLOPIDOGREL BISULFATE 75 MG: 75 TABLET ORAL at 08:42

## 2022-05-23 RX ADMIN — AMLODIPINE BESYLATE 5 MG: 5 TABLET ORAL at 08:42

## 2022-05-23 RX ADMIN — BUMETANIDE 0.5 MG: 0.5 TABLET ORAL at 08:42

## 2022-05-23 RX ADMIN — MICONAZOLE NITRATE: 20 POWDER TOPICAL at 08:42

## 2022-05-23 RX ADMIN — INSULIN ASPART 1 UNITS: 100 INJECTION, SOLUTION INTRAVENOUS; SUBCUTANEOUS at 11:19

## 2022-05-23 RX ADMIN — DICLOFENAC SODIUM 2 G: 10 GEL TOPICAL at 12:15

## 2022-05-23 RX ADMIN — DICLOFENAC SODIUM 2 G: 10 GEL TOPICAL at 08:42

## 2022-05-23 RX ADMIN — LISINOPRIL 20 MG: 20 TABLET ORAL at 08:42

## 2022-05-23 ASSESSMENT — ACTIVITIES OF DAILY LIVING (ADL)
ADLS_ACUITY_SCORE: 46
ADLS_ACUITY_SCORE: 44
ADLS_ACUITY_SCORE: 46

## 2022-05-23 NOTE — PROGRESS NOTES
Patient is A&O x4. Denied pain or SOB. Assist x2 with GB and walker. Occasional non productive cough noted. Solorio intact and patent. Sept well during the shift. Await TCU placement.

## 2022-05-23 NOTE — PROGRESS NOTES
Care Management Discharge Note    Discharge Date: 05/23/2022       Discharge Disposition:      Discharge Services:      Discharge DME:      Discharge Transportation:  (TBD)    Private pay costs discussed: transportation costs and private room cost    PAS Confirmation Code: [id number]  Patient/family educated on Medicare website which has current facility and service quality ratings:      Education Provided on the Discharge Plan:    Persons Notified of Discharge Plans: yes  Patient/Family in Agreement with the Plan: yes    Handoff Referral Completed: no    Additional Information:  Patient accepted to [facility] today. Writer spoke to patient who reported that she would like to be in [place] so she can be closer to home and her daughter. Writer called [facility] and [facility] and left messages regarding beds. Writer will update daughter Josefa when these facility's have responded.     Addendum: [facility] does have a bed for patient. Writer spoke to daughter Josefa and she would like patient to go into a semi private room at [facility].     Ride scheduled via skyway wheelchair for today at 1500 and Josefa is in agreement. Writer updated patient who is in agreement. PAS completed. Bedside nurse updated.    PAS-RR    D: Per DHS regulation, SW completed and submitted PAS-RR to [place] [employer] Direct [employer] via the Senior LinkAge Line.  PAS-RR confirmation # is : [id number]    I: SW spoke with patient and they are aware a PAS-RR has been submitted.  SW reviewed with patient that they may be contacted for a follow up appointment within 10 days of hospital discharge if their SNF stay is < 30 days.  Contact information for Senior LinkAge Line was also provided.    A: patient verbalized understanding.    P: Further questions may be directed to [facility] LinkAge Line at #[phone], option #4 for PAS-RR staff.    Orders received and faxed to facility.         SKYE Galvin

## 2022-05-23 NOTE — DISCHARGE SUMMARY
[facility]  Hospitalist Discharge Summary      Date of Admission:  5/11/2022  Date of Discharge:  5/23/2022  Discharging Provider: Bruce Najera MD  Discharge Service: Hospitalist Service    Discharge Diagnoses    Mild confusion on 5/13, IMPROVED  Mild COVID (initially asymptomatic)  CAUTI ruled out  Bilateral knee pain, L > R   Abnormal MRI of the lumbar spine  Hypertension  CKD  H/O stroke, allergic to ASA  T2DM  MILLIE  Chronic urinary retention requiring chronic joseph catheter  Normocytic anemia      Follow-ups Needed After Discharge   Follow-up Appointments     Follow Up and recommended labs and tests      TCU MD for hospitalization follow up             Unresulted Labs Ordered in the Past 30 Days of this Admission     No orders found from 4/11/2022 to 5/12/2022.      These results will be followed up by NA    Discharge Disposition   Discharged to short-term care facility  Condition at discharge: Stable      Hospital Course   Sarah Noble is a 87 year old female with a past medical history of type 2 diabetes, hypertension, CKD stage III, obesity, chronic knee pain and back pain, chronic urinary retention with a indwelling Joseph catheter presents to hospital after fall for knee pain.  She was incidentally COVID positive on admission but without symptoms at that time; she has since had COVID symptoms starting ~ 5/13.        Mild confusion on 5/13, IMPROVED/resolved  Mild COVID  CAUTI ruled out  * Screening COVD PCR + on admission (5/11) with clear CXR, and no sx of COVID.  Subsequently developed fever on 5/13; cough and SOB on 05/14/22 > started 3 day course of Remdesivir.  * Family concerned about UTI initially due to confusion (no sx of COVID at that time).   * UA showed only 15 WBC, > 182 RBC AFTER cathter change.   * Started meropenem 5/13/22 for fever prior to development of COVID sx. UCx growing mixed genital drew and with only 15 WBC on UA, doubt infection. Stopped  meropenem on 05/15/22.    * CXR clear on 5/13 and 5/14.    * BCx NGTD      Given development of respiratory symptoms, cough, wheezing with O2 of 94%, increased risk of progression > Remdesivir x 3 days on 5/14 - 5/16    Combivent inhalers    Guaifenessen for cough.     On 05/15/22, Last fever, T of 102.4 on 5/14/22 @ 1920. O2 saturations 93% on RA. Hemodynamically stable. WBC normal. DD and CRP elevated.     Started lovenox on 5/13. Followed mildly elevated d-dimer.     Completed short course of remdesivir 5/14 - 5/16 5/16 - 5/23 - Remains afebrile and stable on RA     Bilateral knee pain, L > R   Abnormal MRI of the lumbar spine  Patient with chronic bilateral knee pain. Had right knee pain at home now with worsening left knee pain. Has difficulty ambulating at baseline and uses a walker.  Patient underwent bilateral knee replacements approximately 20 years ago. The patient has a history of chronic pain for which she has used opiates in the past, but has been managing with non-opiate medications for the last year, will continue to try to avoid opiates.    * MRI 5/12/22  Severe complex findings, many of which seen on prior MRI in 2017. (See report for further details)  * CT L knee 5/13/22: No evidence of fracture or TKA loosening. Mild soft tissue swelling superior to the knee. Large cheryl protuberance off the inferior patella, may be residual of prior trauma.    * U/S of the legs on 5/13/22 was limited but negative for DVT. Ordered by ortho for evaluation of knee pain in the context of COVID.     Appreciate ortho consult. Ordered MRI as above. Appreciate their input re: interpretation of these results and correlation with knee pain. On exam, pain seems to be localized to knees and worse with movement. No back pain or radicular type pain reported. No sensory deficits. Does okay with straight leg raise and able to lift each leg off the bed and hold for several seconds. Pain induced by flexion and extension of knee  "L > R. No warmth or overlying skin changes. I am unable to detect clear synovitis due to body habitus.     Per ortho, \"MRI of lumbar spine Iwhile abnormal, does not demonstrate obvious corresponding pathology.\" Agree. No further recommendations regarding abnormalities seen.     CT shows no loosening of prosthesis or occult fracture. Some soft tissue swelling superior to knee and cheryl prominence of inferior patella. ? If that is causing some of her pain. Appreciate ortho's input.     Requires TCU at discharge, as unable to mobilize.      Voltaren gel to bilateral knees, Tylenol as needed    Discharge complicated by COVID positivity - limits TCU options.    5/18 - 5/23 - some improvement with PT - she was hoping to improve enough go home with family support but is in agreement with TCU for strengthening.  Her daughter Dyana was in agreement and encouraged her as well.     Elevated CK, resolving   Could be due to covid 19 or due to her sitting for 3 hours prior to her fall.   CKD stage III Baseline Cr 1.2. Mild elevation.  HTN    Resumed PTA bumex on 05/14/22    Continue PTA amlodipine, lisinopril.     5/19 - fairly persistent BP in 150s systolic - will increase amlodipine to 5mg daily    5/20 - BP improved/WNL and remains at improved/goal thru 5/23     Covid 19 positive, ASYMPTOMATIC  Asymptomatic. Vaccinated. No indication for intervention. PCR positive 5/12/22   - Covid 19 precautions     H/o CVA, allergic to ASA     Continue on PTA plavix     Type 2 diabetes  Diet-controlled. Well controlled. Last a1c was 6.8%.      MILLIE  Does not use CPAP     Chronic Urinary retention  Chronic Solorio Catheter    As above, doubt CAUTI based on UA and UCx results from 5/13     Normocytic anemia  Chronic, stable.  Patient denies any bleeding.      Consultations This Hospital Stay   CARE MANAGEMENT / SOCIAL WORK IP CONSULT  PHYSICAL THERAPY ADULT IP CONSULT  OCCUPATIONAL THERAPY ADULT IP CONSULT  ORTHOPEDIC SURGERY IP CONSULT  SPIRITUAL " HEALTH SERVICES IP CONSULT  PHYSICAL THERAPY ADULT IP CONSULT  OCCUPATIONAL THERAPY ADULT IP CONSULT    Code Status   Full Code    Time Spent on this Encounter   I, Bruce Najera MD, personally saw the patient today and spent greater than 30 minutes discharging this patient.       Bruce Najera MD  [facility]  [address] [place] [id number]  Phone: [phone]  Fax: [phone]  ______________________________________________________________________    Physical Exam   Vital Signs: Temp: 98.2  F (36.8  C) Temp src: Oral BP: 138/64 Pulse: 78   Resp: 18 SpO2: 100 % O2 Device: None (Room air)    Weight: 211 lbs 6.74 oz    Gen: NAD, pleasant  HEENT: EOMI, MMM  Resp: no crackles,  no wheezes, no increased work of resp  CV: S1S2 heard, reg rhythm, reg rate  Abdo: soft, nontender, nondistended, bowel sounds present  Ext: calves nontender, well perfused  Neuro: aaox self and hospital, CN grossly intact, no facial asymmetry         Primary Care Physician   Luther Banerjee    Discharge Orders      General info for SNF    Length of Stay Estimate: Short Term Care: Estimated # of Days <30  Condition at Discharge: Stable  Level of care:skilled   Rehabilitation Potential: Good  Admission H&P remains valid and up-to-date: Yes  Recent Chemotherapy: N/A  Use Nursing Home Standing Orders: Yes     Mantoux instructions    Give two-step Mantoux (PPD) Per Facility Policy Yes     Follow Up and recommended labs and tests    TCU MD for hospitalization follow up     Reason for your hospital stay    Cough and SOB     Glucose monitor nursing POCT    Before meals and at bedtime     Intake and output    Every shift     Daily weights    Call Provider for weight gain of more than 2 pounds per day or 5 pounds per week.     Activity - Up ad jessica     Physical Therapy Adult Consult    Evaluate and treat as clinically indicated.    Reason:  deconditioned     Occupational Therapy Adult Consult    Evaluate and  treat as clinically indicated.    Reason:  deconditioned, advanced age     Fall precautions     Diet    Follow this diet upon discharge: Orders Placed This Encounter      Moderate Consistent Carb (60 g CHO per Meal) Diet       Significant Results and Procedures   Most Recent 3 CBC's:Recent Labs   Lab Test 05/22/22  0713 05/20/22  0741 05/19/22  0740 05/18/22  0850 05/17/22  0757   WBC  --  4.4  --  3.6* 3.4*   HGB  --  11.2*  --  11.4* 11.1*   MCV  --  92  --  92 93    226 206 189 163     Most Recent 3 BMP's:Recent Labs   Lab Test 05/23/22  1111 05/23/22  0646 05/22/22 2129 05/22/22  0817 05/22/22  0713 05/19/22  0834 05/19/22  0740 05/18/22  1151 05/18/22  0850 05/17/22  0814 05/17/22  0757 05/16/22  0921 05/16/22  0748   NA  --   --   --   --   --   --   --   --  138  --  138  --  137   POTASSIUM  --   --   --   --   --   --   --   --  3.8  --  4.0  --  4.0   CHLORIDE  --   --   --   --   --   --   --   --  109  --  109  --  106   CO2  --   --   --   --   --   --   --   --  25  --  24  --  26   BUN  --   --   --   --   --   --   --   --  26  --  29  --  32*   CR  --   --   --   --  1.14*  --  0.96  --  0.89  --  0.92  --  1.08*   ANIONGAP  --   --   --   --   --   --   --   --  4  --  5  --  5   JN  --   --   --   --   --   --   --   --  9.2  --  8.7  --  8.4*   * 97 201*   < >  --    < > 99   < > 105*   < > 100*   < > 139*    < > = values in this interval not displayed.     Most Recent 6 Bacteria Isolates From Any Culture (See EPIC Reports for Culture Details):No lab results found.  Most Recent Urinalysis:Recent Labs   Lab Test 05/13/22  1657   COLOR Straw   APPEARANCE Slightly Cloudy*   URINEGLC Negative   URINEBILI Negative   URINEKETONE Negative   SG 1.013   UBLD Large*   URINEPH 7.0   PROTEIN 30 *   NITRITE Negative   LEUKEST Moderate*   RBCU >182*   WBCU 15*   ,   Results for orders placed or performed during the hospital encounter of 05/11/22   XR Knee Port Bilateral 1/2 Views    Narrative     EXAM: XR KNEE PORT BILATERAL 1/2 VW  LOCATION: [facility]  DATE/TIME: 5/12/2022 4:28 AM    INDICATION: pain after trauma, L>R  COMPARISON: None.      Impression    IMPRESSION:   RIGHT KNEE: Status post right knee arthroplasty and patellar resurfacing. The proximal femoral stem is not included on the field-of-view. Old proximal fibular fracture deformity. Diffuse osseous demineralization with scattered atherosclerotic and venous   stasis calcifications. Small knee joint effusion. No displaced periprosthetic fracture visualized.    LEFT KNEE: Status post left knee arthroplasty and patellar resurfacing. Old proximal fibular shaft fracture deformity. Scattered atherosclerotic and venous stasis calcifications. Osteopenia. Small to moderate knee joint effusion. No definite displaced   fracture.       MR Lumbar Spine w/o Contrast    Narrative    EXAM: MR LUMBAR SPINE W/O CONTRAST  LOCATION: [facility]  DATE/TIME: 5/12/2022 6:53 PM    INDICATION: Unknown primary malignancy, bone eval. Back pain.  COMPARISON: None.  TECHNIQUE: Routine Lumbar Spine MRI without IV contrast.    FINDINGS:   Transitional lumbosacral junction. Last well-formed disc is labeled L5-S1 with right L5 partial sacralization.     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 This begins at L1-L2 where the cord splits into two. L3-L4 demonstrates a septum difficult to discern if fibrous or bony. The tip of the conus conjoined back together at L4. This is a   low-lying conus. Lower thoracic spinal cord demonstrates a prominent central canal or small syrinx measuring 2 mm. Otherwise, no definite abnormal signal conus signal.    Sacrum is not visualized inferior to the S2 segment. Mid to inferior sacrum may be dysmorphic or hypoplastic. Sacral agenesis could be possible. A subtle closed spinal dysraphic defect within the sacrum could be present.    L1 vertebral body mild to moderate  chronic compression. Remaining vertebral body heights within normal limits.  No concerning bone marrow lesions.  Unremarkable visualized bony pelvis.      Presacral edema, nonspecific.    Multilevel degenerative disc disease. Multilevel facet arthropathy. Irregularity spinous processes Baastrup's disease. No interspinous bursitis. Bilateral SI joints are severely irregular may reflect severe degenerative joint disease or seronegative   sacroiliitis.    L1-L2 disc spaces desiccated with mild patchy increased STIR signal likely developing degenerative fluid cleft. L1-L2 plates demonstrate bone marrow edema likely degenerative type I Modic changes. Early manifestation of discitis osteomyelitis less   likely. Please correlate clinically and with inflammatory labs.    L4-L5 mild grade 1 anterolisthesis measuring 1 mm.  L5-S1 mild grade 1 anterolisthesis measuring 2 mm.    T9-T10: Bulge. Small superimposed posterior disc extrusion extending mildly above and below the disc margin. No significant thecal sac stenosis. Mild left foraminal stenosis. Mild to moderate right foraminal stenosis.    T10-T11: Mild bulge. No significant thecal sac stenosis. No significant left foraminal stenosis. Mild to moderate right foraminal stenosis.    T11-T12: Bulge. Small superimposed posterior disc extrusion extending mildly above and below the disc margin. Mild thecal sac stenosis. Moderate left foraminal stenosis. No significant right foraminal stenosis.    T12-L1: Large posterior disc osteophyte complex. Facet hypertrophy. Moderate thecal sac stenosis. Severe left foraminal stenosis. No significant right foraminal stenosis.    L1-L2: Bulge. Small superimposed posterior disc extrusion extending mildly above and below the disc margin. No significant thecal sac stenosis. Severe bilateral foraminal stenosis.      L2-L3: Bulge. Small superimposed posterior disc extrusion extending mildly above and below the disc margin. No significant thecal  sac stenosis. Mild to moderate left foraminal stenosis. Moderate right foraminal stenosis.     L3-L4: Bulge. Central posterior disc protrusion. No significant thecal sac stenosis. Mild to moderate bilateral foraminal stenosis.    L4-L5: Mild bulge. No significant thecal sac stenosis. No significant left foraminal stenosis. Severe right foraminal stenosis.    L5-S1: Mild bulge. No significant thecal sac stenosis. Moderate to severe left foraminal stenosis. Severe right foraminal stenosis.    OTHER:  Right kidney demonstrates multiple cysts. No specific follow up recommended.      Impression    IMPRESSION:  Transitional lumbosacral junction. Last well-formed disc is labeled L5-S1 with right L5 partial sacralization.     No concerning bone marrow lesions.    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 Conus is low-lying.     Lower thoracic spinal cord demonstrates a prominent central canal or small syrinx described above. MRI postcontrast imaging would be of benefit.    L1 vertebral body mild to moderate chronic compression.     Presacral edema, nonspecific.    Multilevel spondylosis described above.     Bilateral SI joints are severely irregular may reflect severe degenerative joint disease or seronegative sacroiliitis.    No critical thecal sac stenosis.    Multilevel severe neural foraminal stenosis.    Sacrum is not visualized inferior to the S2 segment. Mid to inferior sacrum may be dysmorphic or hypoplastic. Sacral agenesis could be possible. A subtle closed spinal dysraphic defect within the sacrum could be present.     CT Knee Left w/o Contrast    Narrative    CT LEFT KNEE WITHOUT AND WITH CONTRAST  5/13/2022 12:30 PM     HISTORY: Bilateral total knee arthroplasties. Worsening left knee  pain.    TECHNIQUE: Axial scans were performed through the left knee with  sagittal and coronal reconstruction. Radiation dose for this scan was  reduced using automated exposure control,  adjustment of the mA and/or  kV according to patient size, or iterative reconstruction technique.    COMPARISON: X-ray from yesterday.    FINDINGS: Long stem femoral and tibial component total knee  arthroplasty. No evidence of acute fracture or loosening. Large bony  protuberance off the inferior patella may be residua from prior trauma  or surgery. Old healed proximal fibular diaphyseal fracture.    Diffuse muscle atrophy. Mild soft tissue swelling just superior to the  knee.      Impression    IMPRESSION:  1. No evidence of acute fracture or loosening.  2. Mild soft tissue swelling just superior to the knee.    NISSA STOCKTON MD         SYSTEM ID:  [id number]   XR Chest Port 1 View    Narrative    EXAM: XR CHEST PORT 1 VIEW  LOCATION: [facility]  DATE/TIME: 5/13/2022 4:32 PM    INDICATION: Confirmed COVID-19. Fever.  COMPARISON: None.      Impression    IMPRESSION:     No focal airspace disease. No pleural effusion or pneumothorax.    The cardiomediastinal silhouette is unremarkable.   US Lower Extremity Venous Duplex Bilateral    Narrative    EXAM: US LOWER EXTREMITY VENOUS DUPLEX BILATERAL  LOCATION: [facility]  DATE/TIME: 5/13/2022 10:41 PM    INDICATION: L>R knee pain, nonspecific. COVID+. Rule out DVT.  Please include iliacs if able.  COMPARISON: None.  TECHNIQUE: Venous Duplex ultrasound of bilateral lower extremities with and without compression, augmentation and duplex. Color flow and spectral Doppler with waveform analysis performed.    FINDINGS: Exam includes the common femoral, femoral, popliteal veins as well as segmentally visualized deep calf veins and greater saphenous vein. Given patient's body habitus and inability for significant compression, there is limited visualization   involving the distal femoral veins and the calf veins.    RIGHT: No deep vein thrombosis. No superficial thrombophlebitis. No popliteal cyst.    LEFT: No deep vein  thrombosis. No superficial thrombophlebitis. No popliteal cyst.      Impression    IMPRESSION:  1.  Given patient's body habitus and inability for significant compression, there is limited visualization involving the distal femoral veins and the calf veins.    2.  Visualized veins of both lower extremities are otherwise normal with no evidence for deep vein thrombosis.   XR Chest Port 1 View    Narrative    EXAM: XR CHEST PORT 1 VIEW  LOCATION: [facility]  DATE/TIME: 5/14/2022 5:04 PM    INDICATION: Shortness of breath, cough, COVID.    COMPARISON: 5/13/2022.      Impression    IMPRESSION: Negative chest.           Discharge Medications   Current Discharge Medication List      START taking these medications    Details   albuterol (PROAIR HFA/PROVENTIL HFA/VENTOLIN HFA) 108 (90 Base) MCG/ACT inhaler Inhale 2 puffs into the lungs every 4 hours as needed for other (Bronchospasm)  Qty: 18 g    Comments: Pharmacy may dispense brand covered by insurance (Proair, or proventil or ventolin or generic albuterol inhaler)  Associated Diagnoses: Cough      benzonatate (TESSALON) 100 MG capsule Take 1 capsule (100 mg) by mouth 3 times daily as needed for cough    Associated Diagnoses: Cough      diclofenac (VOLTAREN) 1 % topical gel Apply 2 g topically 4 times daily    Associated Diagnoses: Aches      guaiFENesin (ROBITUSSIN) 20 mg/mL SOLN solution Take 10 mLs by mouth every 4 hours as needed for cough    Associated Diagnoses: Cough      miconazole (MICATIN) 2 % external powder Apply topically 2 times daily    Associated Diagnoses: Itching         CONTINUE these medications which have CHANGED    Details   amLODIPine (NORVASC) 5 MG tablet Take 1 tablet (5 mg) by mouth daily    Associated Diagnoses: Benign essential hypertension         CONTINUE these medications which have NOT CHANGED    Details   acetaminophen (TYLENOL) 500 MG tablet Take 1,000 mg by mouth At Bedtime For pain      bumetanide (BUMEX) 0.5  MG tablet Take 0.5 mg by mouth daily      clopidogrel (PLAVIX) 75 MG tablet Take 75 mg by mouth daily      lisinopril (ZESTRIL) 20 MG tablet Take 20 mg by mouth daily      nystatin (MYCOSTATIN) 718268 UNIT/GM external powder Apply topically as needed      triamcinolone (KENALOG) 0.1 % external cream Apply topically as needed for irritation           Allergies   No Known Allergies

## 2022-05-23 NOTE — PLAN OF CARE
Physical Therapy Discharge Summary    Reason for therapy discharge:    Discharged to transitional care facility.    Progress towards therapy goal(s). See goals on Care Plan in [facility] electronic health record for goal details.  Goals partially met.  Barriers to achieving goals:   discharge from facility.    Therapy recommendation(s):    Continued therapy is recommended.  Rationale/Recommendations:  recommend continued PT at TCU to progress towards meeting functional mobility goals.

## 2022-05-24 ENCOUNTER — TRANSITIONAL CARE UNIT VISIT (OUTPATIENT)
Dept: GERIATRICS | Facility: CLINIC | Age: 87
End: 2022-05-24
Payer: MEDICARE

## 2022-05-24 DIAGNOSIS — Z86.73 HISTORY OF CVA (CEREBROVASCULAR ACCIDENT): ICD-10-CM

## 2022-05-24 DIAGNOSIS — G47.33 OSA (OBSTRUCTIVE SLEEP APNEA): ICD-10-CM

## 2022-05-24 DIAGNOSIS — M48.00 NEURAL FORAMINAL STENOSIS, MULTILEVEL: ICD-10-CM

## 2022-05-24 DIAGNOSIS — M25.562 ACUTE PAIN OF BOTH KNEES: ICD-10-CM

## 2022-05-24 DIAGNOSIS — I10 HYPERTENSION, UNSPECIFIED TYPE: ICD-10-CM

## 2022-05-24 DIAGNOSIS — R53.81 PHYSICAL DECONDITIONING: ICD-10-CM

## 2022-05-24 DIAGNOSIS — E11.9 DIET-CONTROLLED DIABETES MELLITUS (H): ICD-10-CM

## 2022-05-24 DIAGNOSIS — R33.9 URINARY RETENTION: ICD-10-CM

## 2022-05-24 DIAGNOSIS — Z71.89 ADVANCED DIRECTIVES, COUNSELING/DISCUSSION: ICD-10-CM

## 2022-05-24 DIAGNOSIS — U07.1 INFECTION DUE TO 2019 NOVEL CORONAVIRUS: Primary | ICD-10-CM

## 2022-05-24 DIAGNOSIS — N18.30 STAGE 3 CHRONIC KIDNEY DISEASE, UNSPECIFIED WHETHER STAGE 3A OR 3B CKD (H): ICD-10-CM

## 2022-05-24 DIAGNOSIS — D64.9 NORMOCYTIC ANEMIA: ICD-10-CM

## 2022-05-24 DIAGNOSIS — M25.561 ACUTE PAIN OF BOTH KNEES: ICD-10-CM

## 2022-05-24 PROCEDURE — P9604 ONE-WAY ALLOW PRORATED TRIP: HCPCS | Performed by: NURSE PRACTITIONER

## 2022-05-24 PROCEDURE — 99310 SBSQ NF CARE HIGH MDM 45: CPT | Performed by: NURSE PRACTITIONER

## 2022-05-24 PROCEDURE — 36415 COLL VENOUS BLD VENIPUNCTURE: CPT | Performed by: NURSE PRACTITIONER

## 2022-05-24 PROCEDURE — 86481 TB AG RESPONSE T-CELL SUSP: CPT | Performed by: NURSE PRACTITIONER

## 2022-05-24 NOTE — PROGRESS NOTES
[facility] GERIATRICS    PRIMARY CARE PROVIDER AND CLINIC:  Luther Banerjee MD, [facility] [address] [place] [address] / [facility] [id number]  Chief Complaint   Patient presents with     Hospital F/U      [place] Medical Record Number:  [id number]  Place of Service where encounter took place:  [facility] ([facility]) [[id number]]    Sarah Noble  is a 87 year old  (7/30/1934), admitted to the above facility from  [facility]. Hospital stay 5/11/22 through 5/23/22.  HPI:    87 year old female PMH DM2, HTN, CKD stage III, obesity, chronic knee pain and back pain, chronic urinary retention with a indwelling Solorio hospitalized after fall for knee pain. Incidentally COVID positive on admission but without symptoms initially, later symptomatic 5/13.  Initially ?UTI but urine negative. Due to respiratory symptoms treated with remdesivir 5/14 - 5/16, improved and on room air. Bilateral knee pain which is chronic, managed with Tylenol and Voltaren, to U for therapies. CKD3 baseline Cr 1.2, improved to baseline. Managed with Norvasc, Bumex, lisinopril. Hx CVA, allergic to ASA so on Plavix. DM2 diet controlled. MILLIE not on CPAP. Chronic urinary retention with Solorio in place. Anemia stable.     Seen for initial visit at TCU. Reports Full Code status desired. No headache, dizziness, chest pain, dyspnea, bowel symptoms. BP range 126-139/67-85 and sats 97% room air. Pain minimal in knees.      CODE STATUS/ADVANCE DIRECTIVES DISCUSSION:  Prior  CPR/Full code   ALLERGIES:   Allergies   Allergen Reactions     Aspirin       PAST MEDICAL HISTORY:   Past Medical History:   Diagnosis Date     Chronic kidney disease, stage III (moderate) (H)      Diabetes (H)      Hypertension      MILLIE (obstructive sleep apnea)      Urinary retention     Indwelling Solorio catheter      PAST SURGICAL HISTORY:   has a past surgical history that includes orthopedic surgery.  FAMILY HISTORY: family history includes Alzheimer Disease  in her mother; Heart Disease in her father.  SOCIAL HISTORY:   reports that she has never smoked. She has never used smokeless tobacco. She reports previous alcohol use. She reports previous drug use.  Patient's living condition: lives with family, daughter     Post Discharge Medication Reconciliation Status: discharge medications reconciled, continue medications without change  Current Outpatient Medications   Medication Sig     acetaminophen (TYLENOL) 500 MG tablet Take 1,000 mg by mouth At Bedtime For pain     albuterol (PROAIR HFA/PROVENTIL HFA/VENTOLIN HFA) 108 (90 Base) MCG/ACT inhaler Inhale 2 puffs into the lungs every 4 hours as needed for other (Bronchospasm)     amLODIPine (NORVASC) 5 MG tablet Take 1 tablet (5 mg) by mouth daily     benzonatate (TESSALON) 100 MG capsule Take 1 capsule (100 mg) by mouth 3 times daily as needed for cough     bumetanide (BUMEX) 0.5 MG tablet Take 0.5 mg by mouth daily     clopidogrel (PLAVIX) 75 MG tablet Take 75 mg by mouth daily     diclofenac (VOLTAREN) 1 % topical gel Apply 2 g topically 4 times daily     guaiFENesin (ROBITUSSIN) 20 mg/mL SOLN solution Take 10 mLs by mouth every 4 hours as needed for cough     lisinopril (ZESTRIL) 20 MG tablet Take 20 mg by mouth daily     miconazole (MICATIN) 2 % external powder Apply topically 2 times daily     nystatin (MYCOSTATIN) 652070 UNIT/GM external powder Apply topically as needed     triamcinolone (KENALOG) 0.1 % external cream Apply topically as needed for irritation     No current facility-administered medications for this visit.       ROS:  10 point ROS of systems including Constitutional, Eyes, Respiratory, Cardiovascular, Gastroenterology, Genitourinary, Integumentary, Musculoskeletal, Psychiatric were all negative except for pertinent positives noted in my HPI.    Vitals:  /85   Pulse 81   Temp 98  F (36.7  C)   Resp 18   SpO2 99%   Exam:  GENERAL APPEARANCE:  Alert, in no distress, pleasant, cooperative,  oriented x self, place and recent events  EYES:  EOM, lids, pupils and irises normal, sclera clear and conjunctiva normal, no discharge or mattering on lids or lashes noted  ENT:  Mouth normal, moist mucous membranes, nose normal without drainage or crusting, external ears without lesions, hearing acuity minimally impaired  NECK: supple, symmetrical, trachea midline  RESP:  respiratory effort normal, no chest wall tenderness, no respiratory distress, Lung sounds clear, patient is on room air  CV:  Auscultation of heart done, rate and rhythm controlled and regular, no murmur, no rub or gallop. Edema none bilateral lower extremities. VASCULAR: no open areas lower legs  ABDOMEN:  normal bowel sounds, soft, nontender, no palpable masses.  M/S:   Gait and station not assessed, no tenderness or swelling of the joints; able to move all extremities, digits normal  NEURO: cranial nerves 2-12 grossly intact, no facial asymmetry, no speech deficits and able to follow directions, moves all extremities symmetrically  PSYCH:  insight and judgement and memory appear at baseline, affect and mood normal     Lab/Diagnostic data:  Most Recent 3 CBC's:  Recent Labs   Lab Test 05/22/22  0713 05/20/22  0741 05/19/22  0740 05/18/22  0850 05/17/22  0757   WBC  --  4.4  --  3.6* 3.4*   HGB  --  11.2*  --  11.4* 11.1*   MCV  --  92  --  92 93    226 206 189 163     Most Recent 3 BMP's:  Recent Labs   Lab Test 05/23/22  1111 05/23/22  0646 05/22/22  2129 05/22/22  0817 05/22/22  0713 05/19/22  0834 05/19/22  0740 05/18/22  1151 05/18/22  0850 05/17/22  0814 05/17/22  0757 05/16/22  0921 05/16/22  0748   NA  --   --   --   --   --   --   --   --  138  --  138  --  137   POTASSIUM  --   --   --   --   --   --   --   --  3.8  --  4.0  --  4.0   CHLORIDE  --   --   --   --   --   --   --   --  109  --  109  --  106   CO2  --   --   --   --   --   --   --   --  25  --  24  --  26   BUN  --   --   --   --   --   --   --   --  26  --  29  --   32*   CR  --   --   --   --  1.14*  --  0.96  --  0.89  --  0.92  --  1.08*   ANIONGAP  --   --   --   --   --   --   --   --  4  --  5  --  5   JN  --   --   --   --   --   --   --   --  9.2  --  8.7  --  8.4*   * 97 201*   < >  --    < > 99   < > 105*   < > 100*   < > 139*    < > = values in this interval not displayed.       ASSESSMENT/PLAN:  Infection due to 2019 novel coronavirus  Acute onset, resolved with treatment. Monitor respiratory status. Continue PRN albuterol inhaler, PRN tessalon for cough, robitussin PRN cough,     Physical deconditioning  Acute pain of both knees  Multilevel severe neural foraminal stenosis  Acute on chronic, MRI showed multilevel severe neural stenosis, increased weakness and pain noted. At this time continue PTA tylenol 1000 mg HS, Voltaren gel to knees qid, monitor for effectiveness. Therapies as ordered and f/u with progress next visit. Ortho f/u PRN.     Stage 3 chronic kidney disease, unspecified whether stage 3a or 3b CKD (H)  Chronic, range 0.9-1.14, avoid nephrotoxins and f/u with renal function this week.     Hypertension, unspecified type  Chronic, fair control with PTA Norvasc 5 mg daily, Bumex 0.5 mg daily, lisinopril 20 mg daily, monitor vs and review ranges next visit.     History of CVA (cerebrovascular accident)  By history, due to ASA allergy managed with Plavix 75 mg daily.     Diet-controlled diabetes mellitus (H)  No routine BG checks. Diabetic diet. Monitor BG prn.     MILLIE (obstructive sleep apnea)  Does not use CPAP. Monitor respiratory status.     Urinary retention  Chronic, joseph in place. Routine care, replace monthly, monitor for any new urinary symptoms.     Normocytic anemia  Chronic, CBC this week and f/u with results.     Advanced directives, counseling/discussion  Reviewed code status - Full Code confirmed.     Orders:  1. Full Code  2. CBC and BMP 5/26   3. Add ASA to allergy list    Total unit/floor time of 36 minutes spent consisted of the  following: examination of patient, reviewing the record including pertinent labs and imaging. More than 50% of this time was spent in coordination of care with the patient, nursing staff, and other healthcare providers. This time was spent on discussing the care plan including labs, discharge/safe placement, and specialty follow up. Additional specific discussion included review of code status, pain management, respiratory status meds and monitoring, expected TCU course/routine/discharge planning and clarification of meds and orders with nursing for a total of over 19 min.     Electronically signed by:  ANA Baird CNP

## 2022-05-24 NOTE — PLAN OF CARE
Occupational Therapy Discharge Summary    Reason for therapy discharge:    Discharged to transitional care facility.    Progress towards therapy goal(s). See goals on Care Plan in [facility] electronic health record for goal details.  Goals partially met.  Barriers to achieving goals:   discharge from facility.    Therapy recommendation(s):    Continued therapy is recommended.  Rationale/Recommendations:  to improve activity tolerance and independence with ADL.

## 2022-05-25 ENCOUNTER — LAB REQUISITION (OUTPATIENT)
Dept: LAB | Facility: CLINIC | Age: 87
End: 2022-05-25

## 2022-05-25 DIAGNOSIS — R53.1 WEAKNESS: ICD-10-CM

## 2022-05-25 LAB
GAMMA INTERFERON BACKGROUND BLD IA-ACNC: 0.06 IU/ML
M TB IFN-G BLD-IMP: NEGATIVE
M TB IFN-G CD4+ BCKGRND COR BLD-ACNC: 9.94 IU/ML
MITOGEN IGNF BCKGRD COR BLD-ACNC: -0.01 IU/ML
MITOGEN IGNF BCKGRD COR BLD-ACNC: -0.02 IU/ML
QUANTIFERON MITOGEN: 10 IU/ML
QUANTIFERON NIL TUBE: 0.06 IU/ML
QUANTIFERON TB1 TUBE: 0.04 IU/ML
QUANTIFERON TB2 TUBE: 0.05

## 2022-05-26 VITALS
BODY MASS INDEX: 38.55 KG/M2 | TEMPERATURE: 98.3 F | HEART RATE: 53 BPM | DIASTOLIC BLOOD PRESSURE: 74 MMHG | OXYGEN SATURATION: 97 % | WEIGHT: 224.6 LBS | SYSTOLIC BLOOD PRESSURE: 132 MMHG | RESPIRATION RATE: 18 BRPM

## 2022-05-26 LAB
ANION GAP SERPL CALCULATED.3IONS-SCNC: 4 MMOL/L (ref 3–14)
BUN SERPL-MCNC: 55 MG/DL (ref 7–30)
CALCIUM SERPL-MCNC: 10.2 MG/DL (ref 8.5–10.1)
CHLORIDE BLD-SCNC: 108 MMOL/L (ref 94–109)
CO2 SERPL-SCNC: 28 MMOL/L (ref 20–32)
CREAT SERPL-MCNC: 1.21 MG/DL (ref 0.52–1.04)
ERYTHROCYTE [DISTWIDTH] IN BLOOD BY AUTOMATED COUNT: 12.8 % (ref 10–15)
GFR SERPL CREATININE-BSD FRML MDRD: 43 ML/MIN/1.73M2
GLUCOSE BLD-MCNC: 99 MG/DL (ref 70–99)
HCT VFR BLD AUTO: 35.5 % (ref 35–47)
HGB BLD-MCNC: 11.3 G/DL (ref 11.7–15.7)
MCH RBC QN AUTO: 29.9 PG (ref 26.5–33)
MCHC RBC AUTO-ENTMCNC: 31.8 G/DL (ref 31.5–36.5)
MCV RBC AUTO: 94 FL (ref 78–100)
PLATELET # BLD AUTO: 254 10E3/UL (ref 150–450)
POTASSIUM BLD-SCNC: 4.3 MMOL/L (ref 3.4–5.3)
RBC # BLD AUTO: 3.78 10E6/UL (ref 3.8–5.2)
SODIUM SERPL-SCNC: 140 MMOL/L (ref 133–144)
WBC # BLD AUTO: 5.7 10E3/UL (ref 4–11)

## 2022-05-26 PROCEDURE — 82310 ASSAY OF CALCIUM: CPT | Performed by: NURSE PRACTITIONER

## 2022-05-26 PROCEDURE — 36415 COLL VENOUS BLD VENIPUNCTURE: CPT | Performed by: NURSE PRACTITIONER

## 2022-05-26 PROCEDURE — P9604 ONE-WAY ALLOW PRORATED TRIP: HCPCS | Performed by: NURSE PRACTITIONER

## 2022-05-26 PROCEDURE — 85027 COMPLETE CBC AUTOMATED: CPT | Performed by: NURSE PRACTITIONER

## 2022-05-27 ENCOUNTER — TRANSITIONAL CARE UNIT VISIT (OUTPATIENT)
Dept: GERIATRICS | Facility: CLINIC | Age: 87
End: 2022-05-27
Payer: MEDICARE

## 2022-05-27 DIAGNOSIS — U07.1 INFECTION DUE TO 2019 NOVEL CORONAVIRUS: Primary | ICD-10-CM

## 2022-05-27 DIAGNOSIS — E11.9 DIET-CONTROLLED DIABETES MELLITUS (H): ICD-10-CM

## 2022-05-27 DIAGNOSIS — M25.562 ACUTE PAIN OF BOTH KNEES: ICD-10-CM

## 2022-05-27 DIAGNOSIS — G47.33 OSA (OBSTRUCTIVE SLEEP APNEA): ICD-10-CM

## 2022-05-27 DIAGNOSIS — K59.01 SLOW TRANSIT CONSTIPATION: ICD-10-CM

## 2022-05-27 DIAGNOSIS — R33.9 URINARY RETENTION: ICD-10-CM

## 2022-05-27 DIAGNOSIS — R53.81 PHYSICAL DECONDITIONING: ICD-10-CM

## 2022-05-27 DIAGNOSIS — M25.561 ACUTE PAIN OF BOTH KNEES: ICD-10-CM

## 2022-05-27 DIAGNOSIS — N18.30 STAGE 3 CHRONIC KIDNEY DISEASE, UNSPECIFIED WHETHER STAGE 3A OR 3B CKD (H): ICD-10-CM

## 2022-05-27 DIAGNOSIS — Z86.73 HISTORY OF CVA (CEREBROVASCULAR ACCIDENT): ICD-10-CM

## 2022-05-27 DIAGNOSIS — D64.9 NORMOCYTIC ANEMIA: ICD-10-CM

## 2022-05-27 DIAGNOSIS — I10 HYPERTENSION, UNSPECIFIED TYPE: ICD-10-CM

## 2022-05-27 DIAGNOSIS — M48.00 NEURAL FORAMINAL STENOSIS, MULTILEVEL: ICD-10-CM

## 2022-05-27 PROCEDURE — 99309 SBSQ NF CARE MODERATE MDM 30: CPT | Performed by: NURSE PRACTITIONER

## 2022-05-27 RX ORDER — AMOXICILLIN 250 MG
2 CAPSULE ORAL DAILY
COMMUNITY
End: 2023-01-01

## 2022-05-27 NOTE — PROGRESS NOTES
[facility] GERIATRICS    Chief Complaint   Patient presents with     RECHECK     HPI:  Sarah Noble is a 87 year old  (7/30/1934), who is being seen today for an episodic care visit at: [facility] ([facility]) [[id number]].     Per recent TCU provider progress notes:  87 year old female PMH DM2, HTN, CKD stage III, obesity, chronic knee pain and back pain, chronic urinary retention with a indwelling Solorio hospitalized after fall for knee pain. Incidentally COVID positive on admission but without symptoms initially, later symptomatic 5/13.  Initially ?UTI but urine negative. Due to respiratory symptoms treated with remdesivir 5/14 - 5/16, improved and on room air. Bilateral knee pain which is chronic, managed with Tylenol and Voltaren, to TCU for therapies. CKD3 baseline Cr 1.2, improved to baseline. Managed with Norvasc, Bumex, lisinopril. Hx CVA, allergic to ASA so on Plavix. DM2 diet controlled. MILLIE not on CPAP. Chronic urinary retention with Solorio in place. Anemia stable.     Today's concern is: seen for f/u constipation, BG range, respiratory status, mobility. Reports doing OK, no dyspnea or chest pain. No cough, no fevers. Sats 97% room air. BP range 105-132/54-82. BG range 109-235, diet controlled diabetic will stop routine BG checks. In therapies sit to  // bars max assist. SLUMS 10/30.     Allergies, and PMH/PSH reviewed in EPIC today.  REVIEW OF SYSTEMS:  4 point ROS including Respiratory, CV, GI and , other than that noted in the HPI,  is negative    Objective:   /74   Pulse 53   Temp 98.3  F (36.8  C)   Resp 18   Wt 101.9 kg (224 lb 9.6 oz)   SpO2 97%   BMI 38.55 kg/m    GENERAL APPEARANCE:  Alert, in no distress, pleasant, cooperative, oriented x self, place and recent events  EYES:  EOM, lids, pupils and irises normal, sclera clear and conjunctiva normal, no discharge or mattering on lids or lashes noted  ENT:  Mouth normal, moist mucous membranes, nose normal without  drainage or crusting, external ears without lesions, hearing acuity minimally impaired  RESP:  respiratory effort normal, no chest wall tenderness, no respiratory distress, Lung sounds clear, patient is on room air  CV:  Auscultation of heart done, rate and rhythm controlled and regular, no murmur, no rub or gallop. Edema none bilateral lower extremities.   ABDOMEN:  normal bowel sounds, soft, nontender, no palpable masses.  M/S:   Gait and station not assessed, no tenderness or swelling of the joints; able to move all extremities, digits normal  NEURO: cranial nerves 2-12 grossly intact, no facial asymmetry, no speech deficits and able to follow directions, moves all extremities symmetrically  PSYCH:  insight and judgement and memory appear at baseline, affect and mood normal     5/26/22:      Assessment/Plan:  Infection due to 2019 novel coronavirus  Symptoms resolved with treatment. Monitor respiratory status. Continue PRN albuterol inhaler, PRN tessalon for cough, robitussin PRN cough,     Physical deconditioning  Acute pain of both knees  Multilevel severe neural foraminal stenosis  Acute on chronic, MRI showed multilevel severe neural stenosis, increased weakness and pain noted. Doing well with PTA tylenol 1000 mg HS, Voltaren gel to knees qid, monitor for effectiveness. Therapies as ordered and f/u with progress next visit. Ortho f/u PRN.     Stage 3 chronic kidney disease, unspecified whether stage 3a or 3b CKD (H)  Chronic, range 0.9-1.14, today Cr 1.21. Avoid nephrotoxins and f/u with renal function one week.     Hypertension, unspecified type  Chronic, fair control with PTA Norvasc 5 mg daily, Bumex 0.5 mg daily, lisinopril 20 mg daily, monitor vs and review ranges next visit.     History of CVA (cerebrovascular accident)  By history, due to ASA allergy managed with Plavix 75 mg daily.     Diet-controlled diabetes mellitus (H)  Stop routine BG checks. Diabetic diet. Monitor BG prn.     MILLIE (obstructive  sleep apnea)  Does not use CPAP. Monitor respiratory status.     Urinary retention  Chronic, joseph in place. Routine care, replace monthly, monitor for any new urinary symptoms.     Normocytic anemia  Chronic, Hgb 11.3 this week. Monitor PRN.     Constipation  New complaint, add senna s. Monitor.     Orders:  1. BMP one week diagnosis CKD  2. Stop routine BG checks  3. Senna s 2 tabs daily diagnosis constipation    Electronically signed by: ANA Baird CNP

## 2022-06-02 ENCOUNTER — LAB REQUISITION (OUTPATIENT)
Dept: LAB | Facility: CLINIC | Age: 87
End: 2022-06-02

## 2022-06-02 VITALS
HEART RATE: 85 BPM | HEIGHT: 64 IN | BODY MASS INDEX: 37.65 KG/M2 | SYSTOLIC BLOOD PRESSURE: 118 MMHG | WEIGHT: 220.5 LBS | OXYGEN SATURATION: 99 % | TEMPERATURE: 98.2 F | DIASTOLIC BLOOD PRESSURE: 78 MMHG | RESPIRATION RATE: 18 BRPM

## 2022-06-02 DIAGNOSIS — N18.9 CHRONIC KIDNEY DISEASE, UNSPECIFIED: ICD-10-CM

## 2022-06-02 NOTE — PROGRESS NOTES
[facility]IATRIC [facility]  INITIAL VISIT NOTE  Christie 3, 2022    PRIMARY CARE PROVIDER AND CLINIC:  Luther Banerjee [facility] [address] [place] [address] / [facility] [address]    CHIEF COMPLAINT:  Hospital follow-up/Initial visit    HPI:    Sarah Noble is a 87 year old  (7/30/1934) female who was seen at [facility] on Christie 3, 2022 for an initial visit.     Medical history is notable for hypertension, DM type II, CVA, CKD, urinary retention, UTI, gastric ulcer, spinal stenosis, osteoporosis, vitamin D deficiency, morbid obesity, and MILLIE.    Summary of hospital course:  Patient was hospitalized at [facility] from May 11 through May 23, 2022 after presenting for bilateral knee pain subsequent to mechanical fall.  X-ray examination of knee revealed status post bilateral knee arthroplasty and no fractures.  CT scan of left knee showed no evidence for fracture or loosening.  Doppler study was negative for DVT.  MRI of the lumbar spine demonstrated multilevel spondylosis and multilevel severe neuroforaminal stenosis.  Patient was evaluated by orthopedic service and no surgical intervention was indicated. Notably, she was tested positive for COVID-19 on May 12 and she was treated with remdesivir for 3 days.  Chest ray showed no infiltrates and patient remained asymptomatic. UA was abnormal on May 13, however urine culture grew 50,000-100,000 colonies per mL mixture of urogenital drew. TCU was recommended per therapies.    Patient is admitted to this facility for medical management, nursing care, and rehab.     Of note, history was obtained from patient, facility RN, and extensive review of the chart.    Today's visit:  Patient was seen in her room, while sitting in a wheelchair.  She appears extremely frail but in no acute distress.  Her blood pressure is running low today.  She denies any back or knee pain.  She reports that she had a bowel movement yesterday.  She has chronic indwelling Solorio  catheter.  She denies fever, chills, chest pain, palpitation, dyspnea, nausea, vomiting, abdominal pain, or urinary symptoms.      CODE STATUS:   CPR/Full code     PAST MEDICAL HISTORY:   COVID-19 infection in May 2022  Hypertension  DM type II; diet-controlled  CKD stage IIIb, baseline creatinine 1.1-1.3  Chronic anemia, baseline Hgb 11-12  CVA  Urinary retention, s/p chronic indwelling Solorio catheter  Recurrent UTIs  Gastric ulcer  BPPV  Spinal spondylosis  Chronic low back pain  Osteoporosis  Vitamin D deficiency  MILLIE; not on CPAP  Morbid obesity, BMI 38    Past Medical History:   Diagnosis Date     Chronic kidney disease, stage III (moderate) (H)      Diabetes (H)      Hypertension      MILLIE (obstructive sleep apnea)      Urinary retention     Indwelling Solorio catheter       PAST SURGICAL HISTORY:   Past Surgical History:   Procedure Laterality Date     ORTHOPEDIC SURGERY      Bilateral knee replacement       FAMILY HISTORY:   Family History   Problem Relation Age of Onset     Alzheimer Disease Mother      Heart Disease Father      SOCIAL HISTORY:  Social History     Tobacco Use     Smoking status: Never Smoker     Smokeless tobacco: Never Used   Substance Use Topics     Alcohol use: Not Currently       MEDICATIONS:  Current Outpatient Medications   Medication Sig Dispense Refill     acetaminophen (TYLENOL) 500 MG tablet Take 1,000 mg by mouth At Bedtime For pain       albuterol (PROAIR HFA/PROVENTIL HFA/VENTOLIN HFA) 108 (90 Base) MCG/ACT inhaler Inhale 2 puffs into the lungs every 4 hours as needed for other (Bronchospasm) 18 g      amLODIPine (NORVASC) 5 MG tablet Take 1 tablet (5 mg) by mouth daily       benzonatate (TESSALON) 100 MG capsule Take 1 capsule (100 mg) by mouth 3 times daily as needed for cough       bumetanide (BUMEX) 0.5 MG tablet Take 0.5 mg by mouth daily       clopidogrel (PLAVIX) 75 MG tablet Take 75 mg by mouth daily       diclofenac (VOLTAREN) 1 % topical gel Apply 2 g topically 4 times  "daily       guaiFENesin (ROBITUSSIN) 20 mg/mL SOLN solution Take 10 mLs by mouth every 4 hours as needed for cough       lisinopril (ZESTRIL) 20 MG tablet Take 20 mg by mouth daily       miconazole (MICATIN) 2 % external powder Apply topically 2 times daily       nystatin (MYCOSTATIN) 228679 UNIT/GM external powder Apply topically as needed       senna-docusate (SENOKOT-S/PERICOLACE) 8.6-50 MG tablet Take 2 tablets by mouth daily       triamcinolone (KENALOG) 0.1 % external cream Apply topically as needed for irritation         Post Discharge Medication Reconciliation Status: discharge medications reconciled and changed, per note/orders.        ALLERGIES:  Allergies   Allergen Reactions     Aspirin        ROS:  10 point ROS were negative other than the symptoms noted above in the HPI.    PHYSICAL EXAM:  Vital signs were reviewed in the chart.  Vital Signs: /78   Pulse 85   Temp 98.2  F (36.8  C)   Resp 18   Ht 1.626 m (5' 4\")   Wt 100 kg (220 lb 8 oz)   SpO2 99%   BMI 37.85 kg/m    General: Extremely frail appearing but comfortable and in no acute distress  HEENT: No conjunctival pallor  Cardiovascular: Normal S1, S2, RRR  Respiratory: Lungs clear to auscultation bilaterally  GI: Abdomen soft, non-tender, non-distended, +BS  Extremities: 3+ bilateral LE edema  Neuro: CX II-XII grossly intact; ROM in all four extremities grossly intact  Psych: Alert and oriented x2-3; normal affect  Skin: No acute rash    LABORATORY/IMAGING DATA:  All relevant labs and imaging data were reviewed personally today.    Hematology profile (May 26, 2022): White count 5.7, hemoglobin 11.3, platelet count 254,000, MCV 94    Chemistry profile (May 26, 2022): Sodium 140, potassium 4.3, BUN 55, creatinine 1.21, glucose 99, calcium 10.2    Most Recent 3 CBC's:Recent Labs   Lab Test 05/22/22  0713 05/20/22  0741 05/19/22  0740 05/18/22  0850 05/17/22  0757   WBC  --  4.4  --  3.6* 3.4*   HGB  --  11.2*  --  11.4* 11.1*   MCV  --  92 "  --  92 93    226 206 189 163     Most Recent 3 BMP's:Recent Labs   Lab Test 05/23/22  1111 05/23/22  0646 05/22/22  2129 05/22/22  0817 05/22/22  0713 05/19/22  0834 05/19/22  0740 05/18/22  1151 05/18/22  0850 05/17/22  0814 05/17/22  0757 05/16/22  0921 05/16/22  0748   NA  --   --   --   --   --   --   --   --  138  --  138  --  137   POTASSIUM  --   --   --   --   --   --   --   --  3.8  --  4.0  --  4.0   CHLORIDE  --   --   --   --   --   --   --   --  109  --  109  --  106   CO2  --   --   --   --   --   --   --   --  25  --  24  --  26   BUN  --   --   --   --   --   --   --   --  26  --  29  --  32*   CR  --   --   --   --  1.14*  --  0.96  --  0.89  --  0.92  --  1.08*   ANIONGAP  --   --   --   --   --   --   --   --  4  --  5  --  5   JN  --   --   --   --   --   --   --   --  9.2  --  8.7  --  8.4*   * 97 201*   < >  --    < > 99   < > 105*   < > 100*   < > 139*    < > = values in this interval not displayed.         ASSESSMENT/PLAN:  COVID-19 infection.  Vaccinated against COVID-19.  Tested positive for COVID-19 on May 12.  She received 3 days of remdesivir.  Remained asymptomatic.  Now recovered.  Plan:  Continue as needed albuterol, guaifenesin, and benzonatate  Monitor respiratory status    Mechanical fall, subsequent encounter,  Bilateral knee pain, L>R,  Spinal spondylosis,  Chronic low back pain,  Physical deconditioning.  No evidence for fracture or loosening of knee arthroplasty.  Pain is currently controlled.  Plan:  Fall precautions  Continue pain management with acetaminophen and diclofenac gel  Continue PT/OT evaluation and therapy    Essential hypertension,  Bilateral lower extremity edema.   Blood pressure is running low normal today.  She also has 3+ bilateral lower extremity edema.  Plan:  Discontinue amlodipine  Continue PTA  lisinopril 20 mg p.o. daily and bumetanide 0.5 mg p.o. daily with hold parameters  Lymphedema consult  Monitor blood pressure and leg edema    DM  type II; diet-controlled.  Last hemoglobin A1c was 6.6% on April 1, 2022.  Plan:  Continue diabetic diet  Monitor glucose level PRN  Follow-up as outpatient    CKD stage IIIb.  Baseline creatinine 1.1-1.3.  Last creatinine was stable at 1.21 on May 26.  Plan:  Avoid NSAIDs and nephrotoxins  Follow-up on BMP from today, Christie 3    Hypercalcemia.  Calcium level was mildly evaluated at 10.2 on last BMP on May 26.  Plan:  Follow-up on BMP from today, Christie 3    Chronic anemia.  Baseline Hgb 11-12.  Last hemoglobin was stable at 11.3 on May 26.  Plan:  Monitor hemoglobin periodically    History of CVA.  Allergic to aspirin.  Plan:  Continue clopidogrel 75 mg p.o. daily    Urinary retention, s/p chronic indwelling Solorio catheter.  Plan:  Continue Solorio catheter  Follow-up with urology as directed    Mild cognitive impairment.  Based on my today's gross assessment.  Plan:  Formal cognitive evaluation per OT    Morbid obesity, BMI 38,  MILLIE; not on CPAP.  Plan:  Staff to assist daily care and mobility  Monitor O2 sats          Orders written by provider at facility:  Discontinue amlodipine  Hold lisinopril and bumetanide for SBP less than 100  Lymphedema consult      Recommendation by provider at facility:  Follow-up on BMP from today, Christie 3          Disclaimer: This note may contain text created using speech-recognition software and may contain unintended word substitutions.      Electronically signed by:  Sabino Ashton MD

## 2022-06-03 ENCOUNTER — TRANSITIONAL CARE UNIT VISIT (OUTPATIENT)
Dept: GERIATRICS | Facility: CLINIC | Age: 87
End: 2022-06-03
Payer: MEDICARE

## 2022-06-03 DIAGNOSIS — Z86.73 HISTORY OF CVA (CEREBROVASCULAR ACCIDENT): ICD-10-CM

## 2022-06-03 DIAGNOSIS — M25.561 ACUTE PAIN OF BOTH KNEES: ICD-10-CM

## 2022-06-03 DIAGNOSIS — M25.562 ACUTE PAIN OF BOTH KNEES: ICD-10-CM

## 2022-06-03 DIAGNOSIS — G47.33 OSA (OBSTRUCTIVE SLEEP APNEA): ICD-10-CM

## 2022-06-03 DIAGNOSIS — R53.81 PHYSICAL DECONDITIONING: ICD-10-CM

## 2022-06-03 DIAGNOSIS — E83.52 HYPERCALCEMIA: ICD-10-CM

## 2022-06-03 DIAGNOSIS — R33.9 URINARY RETENTION: ICD-10-CM

## 2022-06-03 DIAGNOSIS — N18.32 STAGE 3B CHRONIC KIDNEY DISEASE (H): ICD-10-CM

## 2022-06-03 DIAGNOSIS — M47.816 SPONDYLOSIS OF LUMBAR SPINE: ICD-10-CM

## 2022-06-03 DIAGNOSIS — E11.9 DIET-CONTROLLED DIABETES MELLITUS (H): ICD-10-CM

## 2022-06-03 DIAGNOSIS — E66.01 MORBID OBESITY (H): ICD-10-CM

## 2022-06-03 DIAGNOSIS — I10 ESSENTIAL HYPERTENSION: ICD-10-CM

## 2022-06-03 DIAGNOSIS — D64.9 CHRONIC ANEMIA: ICD-10-CM
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
## 2022-06-03 DIAGNOSIS — U07.1 INFECTION DUE TO 2019 NOVEL CORONAVIRUS: Primary | ICD-10-CM

## 2022-06-03 LAB
ANION GAP SERPL CALCULATED.3IONS-SCNC: 6 MMOL/L (ref 3–14)
BUN SERPL-MCNC: 55 MG/DL (ref 7–30)
CALCIUM SERPL-MCNC: 9.6 MG/DL (ref 8.5–10.1)
CHLORIDE BLD-SCNC: 109 MMOL/L (ref 94–109)
CO2 SERPL-SCNC: 26 MMOL/L (ref 20–32)
CREAT SERPL-MCNC: 1.2 MG/DL (ref 0.52–1.04)
GFR SERPL CREATININE-BSD FRML MDRD: 44 ML/MIN/1.73M2
GLUCOSE BLD-MCNC: 107 MG/DL (ref 70–99)
POTASSIUM BLD-SCNC: 4.3 MMOL/L (ref 3.4–5.3)
SODIUM SERPL-SCNC: 141 MMOL/L (ref 133–144)

## 2022-06-03 PROCEDURE — 80048 BASIC METABOLIC PNL TOTAL CA: CPT | Performed by: NURSE PRACTITIONER

## 2022-06-03 PROCEDURE — 99305 1ST NF CARE MODERATE MDM 35: CPT | Performed by: INTERNAL MEDICINE

## 2022-06-03 PROCEDURE — P9604 ONE-WAY ALLOW PRORATED TRIP: HCPCS | Performed by: NURSE PRACTITIONER

## 2022-06-03 PROCEDURE — 36415 COLL VENOUS BLD VENIPUNCTURE: CPT | Performed by: NURSE PRACTITIONER

## 2022-06-03 NOTE — LETTER
6/3/2022        RE: Sarah Noble  [address] [facility] [place] [address]        [facility]  INITIAL VISIT NOTE  Christie 3, 2022    PRIMARY CARE PROVIDER AND CLINIC:  [facility] [address]  / [facility] [id number]    CHIEF COMPLAINT:  Hospital follow-up/Initial visit    HPI:    Sarah Noble is a 87 year old  (7/30/1934) female who was seen at [facility] on Christie 3, 2022 for an initial visit.     Medical history is notable for hypertension, DM type II, CVA, CKD, urinary retention, UTI, gastric ulcer, spinal stenosis, osteoporosis, vitamin D deficiency, morbid obesity, and MILLIE.    Summary of hospital course:  Patient was hospitalized at [facility] from May 11 through May 23, 2022 after presenting for bilateral knee pain subsequent to mechanical fall.  X-ray examination of knee revealed status post bilateral knee arthroplasty and no fractures.  CT scan of left knee showed no evidence for fracture or loosening.  Doppler study was negative for DVT.  MRI of the lumbar spine demonstrated multilevel spondylosis and multilevel severe neuroforaminal stenosis.  Patient was evaluated by orthopedic service and no surgical intervention was indicated. Notably, she was tested positive for COVID-19 on May 12 and she was treated with remdesivir for 3 days.  Chest ray showed no infiltrates and patient remained asymptomatic. UA was abnormal on May 13, however urine culture grew 50,000-100,000 colonies per mL mixture of urogenital drew. TCU was recommended per therapies.    Patient is admitted to this facility for medical management, nursing care, and rehab.     Of note, history was obtained from patient, facility RN, and extensive review of the chart.    Today's visit:  Patient was seen in her room, while sitting in a wheelchair.  She appears extremely frail but in no acute distress.  Her blood pressure is running low today.  She denies any back or  knee pain.  She reports that she had a bowel movement yesterday.  She has chronic indwelling Solorio catheter.  She denies fever, chills, chest pain, palpitation, dyspnea, nausea, vomiting, abdominal pain, or urinary symptoms.      CODE STATUS:   CPR/Full code     PAST MEDICAL HISTORY:   COVID-19 infection in May 2022  Hypertension  DM type II; diet-controlled  CKD stage IIIb, baseline creatinine 1.1-1.3  Chronic anemia, baseline Hgb 11-12  CVA  Urinary retention, s/p chronic indwelling Solorio catheter  Recurrent UTIs  Gastric ulcer  BPPV  Spinal spondylosis  Chronic low back pain  Osteoporosis  Vitamin D deficiency  MILLIE; not on CPAP  Morbid obesity, BMI 38    Past Medical History:   Diagnosis Date     Chronic kidney disease, stage III (moderate) (H)      Diabetes (H)      Hypertension      MILLIE (obstructive sleep apnea)      Urinary retention     Indwelling Solorio catheter       PAST SURGICAL HISTORY:   Past Surgical History:   Procedure Laterality Date     ORTHOPEDIC SURGERY      Bilateral knee replacement       FAMILY HISTORY:   Family History   Problem Relation Age of Onset     Alzheimer Disease Mother      Heart Disease Father      SOCIAL HISTORY:  Social History     Tobacco Use     Smoking status: Never Smoker     Smokeless tobacco: Never Used   Substance Use Topics     Alcohol use: Not Currently       MEDICATIONS:  Current Outpatient Medications   Medication Sig Dispense Refill     acetaminophen (TYLENOL) 500 MG tablet Take 1,000 mg by mouth At Bedtime For pain       albuterol (PROAIR HFA/PROVENTIL HFA/VENTOLIN HFA) 108 (90 Base) MCG/ACT inhaler Inhale 2 puffs into the lungs every 4 hours as needed for other (Bronchospasm) 18 g      amLODIPine (NORVASC) 5 MG tablet Take 1 tablet (5 mg) by mouth daily       benzonatate (TESSALON) 100 MG capsule Take 1 capsule (100 mg) by mouth 3 times daily as needed for cough       bumetanide (BUMEX) 0.5 MG tablet Take 0.5 mg by mouth daily       clopidogrel (PLAVIX) 75 MG tablet  "Take 75 mg by mouth daily       diclofenac (VOLTAREN) 1 % topical gel Apply 2 g topically 4 times daily       guaiFENesin (ROBITUSSIN) 20 mg/mL SOLN solution Take 10 mLs by mouth every 4 hours as needed for cough       lisinopril (ZESTRIL) 20 MG tablet Take 20 mg by mouth daily       miconazole (MICATIN) 2 % external powder Apply topically 2 times daily       nystatin (MYCOSTATIN) 776126 UNIT/GM external powder Apply topically as needed       senna-docusate (SENOKOT-S/PERICOLACE) 8.6-50 MG tablet Take 2 tablets by mouth daily       triamcinolone (KENALOG) 0.1 % external cream Apply topically as needed for irritation         Post Discharge Medication Reconciliation Status: discharge medications reconciled and changed, per note/orders.        ALLERGIES:  Allergies   Allergen Reactions     Aspirin        ROS:  10 point ROS were negative other than the symptoms noted above in the HPI.    PHYSICAL EXAM:  Vital signs were reviewed in the chart.  Vital Signs: /78   Pulse 85   Temp 98.2  F (36.8  C)   Resp 18   Ht 1.626 m (5' 4\")   Wt 100 kg (220 lb 8 oz)   SpO2 99%   BMI 37.85 kg/m    General: Extremely frail appearing but comfortable and in no acute distress  HEENT: No conjunctival pallor  Cardiovascular: Normal S1, S2, RRR  Respiratory: Lungs clear to auscultation bilaterally  GI: Abdomen soft, non-tender, non-distended, +BS  Extremities: 3+ bilateral LE edema  Neuro: CX II-XII grossly intact; ROM in all four extremities grossly intact  Psych: Alert and oriented x2-3; normal affect  Skin: No acute rash    LABORATORY/IMAGING DATA:  All relevant labs and imaging data were reviewed personally today.    Hematology profile (May 26, 2022): White count 5.7, hemoglobin 11.3, platelet count 254,000, MCV 94    Chemistry profile (May 26, 2022): Sodium 140, potassium 4.3, BUN 55, creatinine 1.21, glucose 99, calcium 10.2    Most Recent 3 CBC's:Recent Labs   Lab Test 05/22/22  0713 05/20/22  0741 05/19/22  0740 " 05/18/22  0850 05/17/22  0757   WBC  --  4.4  --  3.6* 3.4*   HGB  --  11.2*  --  11.4* 11.1*   MCV  --  92  --  92 93    226 206 189 163     Most Recent 3 BMP's:Recent Labs   Lab Test 05/23/22  1111 05/23/22  0646 05/22/22  2129 05/22/22  0817 05/22/22  0713 05/19/22  0834 05/19/22  0740 05/18/22  1151 05/18/22  0850 05/17/22  0814 05/17/22  0757 05/16/22  0921 05/16/22  0748   NA  --   --   --   --   --   --   --   --  138  --  138  --  137   POTASSIUM  --   --   --   --   --   --   --   --  3.8  --  4.0  --  4.0   CHLORIDE  --   --   --   --   --   --   --   --  109  --  109  --  106   CO2  --   --   --   --   --   --   --   --  25  --  24  --  26   BUN  --   --   --   --   --   --   --   --  26  --  29  --  32*   CR  --   --   --   --  1.14*  --  0.96  --  0.89  --  0.92  --  1.08*   ANIONGAP  --   --   --   --   --   --   --   --  4  --  5  --  5   JN  --   --   --   --   --   --   --   --  9.2  --  8.7  --  8.4*   * 97 201*   < >  --    < > 99   < > 105*   < > 100*   < > 139*    < > = values in this interval not displayed.         ASSESSMENT/PLAN:  COVID-19 infection.  Vaccinated against COVID-19.  Tested positive for COVID-19 on May 12.  She received 3 days of remdesivir.  Remained asymptomatic.  Now recovered.  Plan:  Continue as needed albuterol, guaifenesin, and benzonatate  Monitor respiratory status    Mechanical fall, subsequent encounter,  Bilateral knee pain, L>R,  Spinal spondylosis,  Chronic low back pain,  Physical deconditioning.  No evidence for fracture or loosening of knee arthroplasty.  Pain is currently controlled.  Plan:  Fall precautions  Continue pain management with acetaminophen and diclofenac gel  Continue PT/OT evaluation and therapy    Essential hypertension,  Bilateral lower extremity edema.   Blood pressure is running low normal today.  She also has 3+ bilateral lower extremity edema.  Plan:  Discontinue amlodipine  Continue PTA  lisinopril 20 mg p.o. daily and  bumetanide 0.5 mg p.o. daily with hold parameters  Lymphedema consult  Monitor blood pressure and leg edema    DM type II; diet-controlled.  Last hemoglobin A1c was 6.6% on April 1, 2022.  Plan:  Continue diabetic diet  Monitor glucose level PRN  Follow-up as outpatient    CKD stage IIIb.  Baseline creatinine 1.1-1.3.  Last creatinine was stable at 1.21 on May 26.  Plan:  Avoid NSAIDs and nephrotoxins  Follow-up on BMP from today, Christie 3    Hypercalcemia.  Calcium level was mildly evaluated at 10.2 on last BMP on May 26.  Plan:  Follow-up on BMP from today, Christie 3    Chronic anemia.  Baseline Hgb 11-12.  Last hemoglobin was stable at 11.3 on May 26.  Plan:  Monitor hemoglobin periodically    History of CVA.  Allergic to aspirin.  Plan:  Continue clopidogrel 75 mg p.o. daily    Urinary retention, s/p chronic indwelling Solorio catheter.  Plan:  Continue Solorio catheter  Follow-up with urology as directed    Mild cognitive impairment.  Based on my today's gross assessment.  Plan:  Formal cognitive evaluation per OT    Morbid obesity, BMI 38,  MILLIE; not on CPAP.  Plan:  Staff to assist daily care and mobility  Monitor O2 sats          Orders written by provider at facility:  Discontinue amlodipine  Hold lisinopril and bumetanide for SBP less than 100  Lymphedema consult      Recommendation by provider at facility:  Follow-up on BMP from today, Christie 3          Disclaimer: This note may contain text created using speech-recognition software and may contain unintended word substitutions.      Electronically signed by:  Sabino Ashton MD                          Sincerely,        Sabino Ashton MD

## 2022-06-08 VITALS
HEIGHT: 64 IN | HEART RATE: 79 BPM | TEMPERATURE: 97.6 F | WEIGHT: 220.4 LBS | SYSTOLIC BLOOD PRESSURE: 135 MMHG | RESPIRATION RATE: 18 BRPM | BODY MASS INDEX: 37.63 KG/M2 | DIASTOLIC BLOOD PRESSURE: 84 MMHG | OXYGEN SATURATION: 99 %

## 2022-06-09 ENCOUNTER — TRANSITIONAL CARE UNIT VISIT (OUTPATIENT)
Dept: GERIATRICS | Facility: CLINIC | Age: 87
End: 2022-06-09
Payer: MEDICARE

## 2022-06-09 DIAGNOSIS — R53.81 PHYSICAL DECONDITIONING: ICD-10-CM

## 2022-06-09 DIAGNOSIS — G47.33 OSA (OBSTRUCTIVE SLEEP APNEA): ICD-10-CM

## 2022-06-09 DIAGNOSIS — M47.816 SPONDYLOSIS OF LUMBAR SPINE: ICD-10-CM

## 2022-06-09 DIAGNOSIS — I10 ESSENTIAL HYPERTENSION: ICD-10-CM

## 2022-06-09 DIAGNOSIS — N18.32 STAGE 3B CHRONIC KIDNEY DISEASE (H): ICD-10-CM

## 2022-06-09 DIAGNOSIS — M25.561 ACUTE PAIN OF BOTH KNEES: ICD-10-CM

## 2022-06-09 DIAGNOSIS — U07.1 INFECTION DUE TO 2019 NOVEL CORONAVIRUS: Primary | ICD-10-CM

## 2022-06-09 DIAGNOSIS — E83.52 HYPERCALCEMIA: ICD-10-CM

## 2022-06-09 DIAGNOSIS — D64.9 CHRONIC ANEMIA: ICD-10-CM

## 2022-06-09 DIAGNOSIS — M25.562 ACUTE PAIN OF BOTH KNEES: ICD-10-CM

## 2022-06-09 DIAGNOSIS — E11.9 DIET-CONTROLLED DIABETES MELLITUS (H): ICD-10-CM

## 2022-06-09 DIAGNOSIS — E66.01 MORBID OBESITY (H): ICD-10-CM

## 2022-06-09 DIAGNOSIS — R33.9 URINARY RETENTION: ICD-10-CM

## 2022-06-09 PROCEDURE — 99309 SBSQ NF CARE MODERATE MDM 30: CPT | Mod: CS | Performed by: NURSE PRACTITIONER

## 2022-06-09 NOTE — PROGRESS NOTES
"[facility]    Chief Complaint   Patient presents with     RECHECK     HPI:  Sarah Noble is a 87 year old  (7/30/1934), who is being seen today for an episodic care visit at: [facility] (TCU) [[id number]].     Per recent TCU provider progress notes:  87 year old female PMH DM2, HTN, CKD stage III, obesity, chronic knee pain and back pain, chronic urinary retention with a indwelling Solorio hospitalized after fall for knee pain. Incidentally COVID positive on admission but without symptoms initially, later symptomatic 5/13.  Initially ?UTI but urine negative. Due to respiratory symptoms treated with remdesivir 5/14 - 5/16, improved and on room air. Bilateral knee pain which is chronic, managed with Tylenol and Voltaren, to TCU for therapies. CKD3 baseline Cr 1.2, improved to baseline. Managed with Norvasc, Bumex, lisinopril. Hx CVA, allergic to ASA so on Plavix. DM2 diet controlled. MILLIE not on CPAP. Chronic urinary retention with Solorio in place. Anemia stable.     Today's concern is: patient seen for f/u mobility, respiratory status, lab results. No new concerns reported. SLUMS 10/30 showing cognitive impairment. Walks 100 ft with 2ww. BP range 101-138/67-84 and sats 97% room air, not using PRN meds.     Allergies, and PMH/PSH reviewed in [facility] today.    REVIEW OF SYSTEMS:  Deferred.     Objective:   /84   Pulse 79   Temp 97.6  F (36.4  C)   Resp 18   Ht 1.626 m (5' 4\")   Wt 100 kg (220 lb 6.4 oz)   SpO2 99%   BMI 37.83 kg/m    GENERAL APPEARANCE:  Alert, in no distress, cooperative  ENT:  Mouth normal, moist mucous membranes, normal hearing acuity  EYES:  Conjunctiva and lids normal  RESP:  no respiratory distress, on room air  NEURO:   No facial asymmetry, speech clear  PSYCH:  oriented X 3, affect and mood normal    Lab results: 6/3/22      Assessment/Plan:  COVID-19 infection  Tested positive for COVID-19, received 3 days of remdesivir. Continue as needed albuterol, stop " guaifenesin and benzonatate since not used. Monitor respiratory status    Bilateral knee pain  Spinal spondylosis  Chronic low back pain  Physical deconditioning  Acute on chronic, pain controlled with acetaminophen and diclofenac gel. Continue PT/OT evaluation and therapy and f/u progress next visit. Monitor for safety.     Essential hypertension  Chronic, improved since stopping amlodipine. Continue PTA  lisinopril 20 mg p.o. daily and bumetanide 0.5 mg p.o. daily with hold parameters. Monitor blood pressure and leg edema.     DM type II  Stable and diet managed. Monitor BG as needed.     CKD stage IIIb  Baseline creatinine 1.1-1.3, stable at 1.2 on 6/3. Monitor renal function PRN. Avoid nephrotoxins.     Hypercalcemia  Resolved, Ca 9.6 last check on 6/3/22.     Chronic anemia  Baseline Hgb 11-12. Stable last check.     Urinary retention, s/p chronic indwelling Solorio  Continue Solorio catheter. Follow-up with urology as directed.     Morbid obesity  MILLIE  Chronic, stable. Staff to assist with daily care and mobility. Monitor respiratory symptoms.     Orders:  1. discontinue benzonatate, nystatin, guaifenesin - not used    Electronically signed by: ANA Baird CNP

## 2022-06-14 VITALS
RESPIRATION RATE: 18 BRPM | BODY MASS INDEX: 37.42 KG/M2 | WEIGHT: 219.2 LBS | OXYGEN SATURATION: 97 % | DIASTOLIC BLOOD PRESSURE: 86 MMHG | HEART RATE: 66 BPM | TEMPERATURE: 98.6 F | HEIGHT: 64 IN | SYSTOLIC BLOOD PRESSURE: 121 MMHG

## 2022-06-15 ENCOUNTER — TRANSITIONAL CARE UNIT VISIT (OUTPATIENT)
Dept: GERIATRICS | Facility: CLINIC | Age: 87
End: 2022-06-15
Payer: MEDICARE

## 2022-06-15 DIAGNOSIS — R53.81 PHYSICAL DECONDITIONING: ICD-10-CM

## 2022-06-15 DIAGNOSIS — R33.9 URINARY RETENTION: ICD-10-CM

## 2022-06-15 DIAGNOSIS — M25.561 ACUTE PAIN OF BOTH KNEES: ICD-10-CM

## 2022-06-15 DIAGNOSIS — M25.562 ACUTE PAIN OF BOTH KNEES: ICD-10-CM

## 2022-06-15 DIAGNOSIS — E83.52 HYPERCALCEMIA: ICD-10-CM

## 2022-06-15 DIAGNOSIS — E11.9 DIET-CONTROLLED DIABETES MELLITUS (H): ICD-10-CM

## 2022-06-15 DIAGNOSIS — U07.1 INFECTION DUE TO 2019 NOVEL CORONAVIRUS: Primary | ICD-10-CM

## 2022-06-15 DIAGNOSIS — D64.9 CHRONIC ANEMIA: ICD-10-CM

## 2022-06-15 DIAGNOSIS — E66.01 MORBID OBESITY (H): ICD-10-CM

## 2022-06-15 DIAGNOSIS — N18.32 STAGE 3B CHRONIC KIDNEY DISEASE (H): ICD-10-CM

## 2022-06-15 DIAGNOSIS — M47.816 SPONDYLOSIS OF LUMBAR SPINE: ICD-10-CM

## 2022-06-15 DIAGNOSIS — I10 ESSENTIAL HYPERTENSION: ICD-10-CM

## 2022-06-15 DIAGNOSIS — G47.33 OSA (OBSTRUCTIVE SLEEP APNEA): ICD-10-CM

## 2022-06-15 PROCEDURE — 99309 SBSQ NF CARE MODERATE MDM 30: CPT | Performed by: NURSE PRACTITIONER

## 2022-06-15 NOTE — PROGRESS NOTES
"[facility]IAT[facility]    Chief Complaint   Patient presents with     RECHECK     HPI:  Sarah Noble is a 87 year old  (7/30/1934), who is being seen today for an episodic care visit at: [facility] (TCU) [[id number]].     Per recent TCU provider progress notes:  87 year old female PMH DM2, HTN, CKD stage III, obesity, chronic knee pain and back pain, chronic urinary retention with a indwelling Solorio hospitalized after fall for knee pain. Incidentally COVID positive on admission but without symptoms initially, later symptomatic 5/13.  Initially ?UTI but urine negative. Due to respiratory symptoms treated with remdesivir 5/14 - 5/16, improved and on room air. Bilateral knee pain which is chronic, managed with Tylenol and Voltaren, to TCU for therapies. CKD3 baseline Cr 1.2, improved to baseline. Managed with Norvasc, Bumex, lisinopril. Hx CVA, allergic to ASA so on Plavix. DM2 diet controlled. MILLIE not on CPAP. Chronic urinary retention with Solorio in place. Anemia stable.     Today's concern is: patient seen for f/u mobility, respiratory status, VS review. Denies headaches, dizziness, chest pain, dyspnea, bowel issues. Feels getting stronger. Working on sit to stand with CGA. SLUMS 10/30 showing cognitive impairment.  BP range 112-141/57-86 and sats 98% room air. Family present and have no concerns.    Allergies, and PMH/PSH reviewed in [facility] today.    REVIEW OF SYSTEMS:  4 point ROS including Respiratory, CV, GI and , other than that noted in the HPI,  is negative.     Objective:   /86   Pulse 66   Temp 98.6  F (37  C)   Resp 18   Ht 1.626 m (5' 4\")   Wt 99.4 kg (219 lb 3.2 oz)   SpO2 97%   BMI 37.63 kg/m    GENERAL APPEARANCE:  Alert, in no distress, cooperative  ENT:  Mouth normal, moist mucous membranes, normal hearing acuity  EYES:  Conjunctiva and lids normal  RESP:  no respiratory distress, on room air and LSC  CV: HRR, +2 pitting LE edema  NEURO:   No facial asymmetry, speech " clear  PSYCH:  oriented X 3, affect and mood normal    Lab results: 6/3/22      Assessment/Plan:  COVID-19 infection  Resolved with treatment Continue as needed albuterol, stopped guaifenesin and benzonatate since not used. Monitor respiratory status    Bilateral knee pain  Spinal spondylosis  Chronic low back pain  Physical deconditioning  Acute on chronic, improving. Pain controlled with acetaminophen and diclofenac gel, improving. Continue PT/OT evaluation and therapy and f/u progress next visit. Monitor for safety.     Essential hypertension  Chronic, improved since stopping amlodipine. Continue PTA  lisinopril 20 mg p.o. daily and bumetanide 0.5 mg p.o. daily with hold parameters. Monitor blood pressure and leg edema. Review ranges next visit.    DM type II  Stable and diet managed. Monitor BG as needed.     CKD stage IIIb  Baseline creatinine 1.1-1.3, stable at 1.2 on 6/3. Monitor renal function PRN. Avoid nephrotoxins.     Hypercalcemia  Resolved, Ca 9.6 last check on 6/3/22.     Chronic anemia  Baseline Hgb 11-12. Stable last check.     Urinary retention, s/p chronic indwelling Solorio  Continue Solorio catheter. Follow-up with urology as directed.     Morbid obesity  MILLIE  Chronic, stable. Staff to assist with daily care and mobility. Monitor respiratory symptoms.     Orders:  No new orders    Electronically signed by: ANA Baird CNP

## 2022-06-21 NOTE — PROGRESS NOTES
[facility] DISCHARGE SUMMARY  PATIENT'S NAME: Sarah Noble  YOB: 1934  MEDICAL RECORD NUMBER:  [id number]  Place of Service where encounter took place:  [facility]    PRIMARY CARE PROVIDER AND CLINIC RESPONSIBLE AFTER TRANSFER:   Luther Banerjee MD, [facility] [address] [place] [address] / [facility] [address]        Transferring providers: Sabino Ashton MD  Recent Hospitalization/ED:  [facility] stay from May 11 through May 23, 2022.  Date of SNF Admission: May 23, 2022  Date of SNF (anticipated) Discharge: June 25, 2022  Discharged to: previous independent home  Cognitive Scores: SLUMS: 10/30 (May 25, 2022), CPT score 4.6/5.6 (June 7, 2022)  Physical Function: Unavailable  DME: Walker    CODE STATUS/ADVANCE DIRECTIVES DISCUSSION:  Full Code     ALLERGIES: Aspirin    DISCHARGE DIAGNOSIS/NURSING FACILITY COURSE:   Sarah Noble is a 87 year old  (7/30/1934) female with past medical history is notable for hypertension, DM type II, CVA, CKD, urinary retention, UTI, gastric ulcer, spinal stenosis, osteoporosis, vitamin D deficiency, morbid obesity, and MILLIE.     She hospitalized at [facility] from May 11 through May 23, 2022 after presenting for bilateral knee pain subsequent to mechanical fall.  X-ray examination of knee revealed status post bilateral knee arthroplasty and no fractures.  CT scan of left knee showed no evidence for fracture or loosening.  Doppler study was negative for DVT.  MRI of the lumbar spine demonstrated multilevel spondylosis and multilevel severe neuroforaminal stenosis.  Patient was evaluated by orthopedic service and no surgical intervention was indicated. Notably, she was tested positive for COVID-19 on May 12 and she was treated with remdesivir for 3 days.  Chest ray showed no infiltrates and patient remained asymptomatic. UA was abnormal on May 13, however urine culture grew  50,000-100,000 colonies per mL mixture of urogenital drew. TCU was recommended per therapies. Patient was then admitted to this facility for medical management, nursing care, and rehab.  She had significant progress in TCU unable to walk with a walker.  Her SLUMS score was 10/30.    Today's visit:  Patient was seen in her room, while sitting in a chair.  She appears frail but comfortable.  She clearly has cognitive impairment.  She reports no joint pain, cough, sputum, chest pain, palpitation, dyspnea, nausea, vomiting, abdominal pain, or urinary symptoms.  Notably she has a chronic Solorio catheter.  She reports that she had a bowel movement 2 days ago.    COVID-19 infection.  Vaccinated against COVID-19.  Tested positive for COVID-19 on May 12.  She received 3 days of remdesivir.  Remained asymptomatic.  Now recovered.  Plan:  As needed albuterol inhaler     Mechanical fall, subsequent encounter,  Bilateral knee pain, L>R,  Spinal spondylosis,  Chronic low back pain,  Physical deconditioning.  No evidence for fracture or loosening of knee arthroplasty.  Pain resolved and physical endurance improved with therapies.  Plan:  Continue pain management with acetaminophen and diclofenac gel  She is to continue with home PT/OT after discharge from TCU     Essential hypertension,  Bilateral lower extremity edema.   PTA amlodipine was discontinued due to soft blood pressures and leg edema.  Plan:  Continue PTA  lisinopril 20 mg p.o. daily and bumetanide 0.5 mg p.o. daily with hold parameters  Continue with leg wraps in TCU and discharge with compression stockings  Lymphedema therapy referral after discharge from TCU    DM type II; diet-controlled.  Last hemoglobin A1c was 6.6% on April 1, 2022.  Plan:  Continue diabetic diet  Follow-up as outpatient     CKD stage IIIb.  Baseline creatinine 1.1-1.3.  Last creatinine was stable at 1.2 on Christie 3.  Plan:  Avoid NSAIDs and nephrotoxins  Monitor as outpatient     Chronic  anemia.  Baseline Hgb 11-12.  Last hemoglobin was stable at 11.3 on May 26.  Plan:  Monitor hemoglobin as outpatient     History of CVA.  Allergic to aspirin.  Plan:  Continue clopidogrel 75 mg p.o. daily     Urinary retention, s/p chronic indwelling Solorio catheter.  Plan:  Continue Solorio catheter  Follow-up with urology as directed     Cognitive impairment.  SLUMS score 10/30 on May 25, 2022.     Morbid obesity, BMI 39.7,  MILLIE.  Not on CPAP.           Past Medical History:   Cognitive impairment; SLUMS 10/30 on May 25, 2022  COVID-19 infection in May 2022  Hypertension  DM type II; diet-controlled  CKD stage IIIb, baseline creatinine 1.1-1.3  Chronic anemia, baseline Hgb 11-12   CVA  Urinary retention, s/p chronic indwelling Solorio catheter  Recurrent UTIs  Gastric ulcer  BPPV  Spinal spondylosis  Chronic low back pain  Osteoporosis  Vitamin D deficiency  MILLIE; not on CPAP  Morbid obesity, BMI 38    Discharge Medications:  Current Outpatient Medications   Medication Sig Dispense Refill     acetaminophen (TYLENOL) 500 MG tablet Take 1,000 mg by mouth At Bedtime For pain       albuterol (PROAIR HFA/PROVENTIL HFA/VENTOLIN HFA) 108 (90 Base) MCG/ACT inhaler Inhale 2 puffs into the lungs every 4 hours as needed for other (Bronchospasm) 18 g      bumetanide (BUMEX) 0.5 MG tablet Take 0.5 mg by mouth daily       clopidogrel (PLAVIX) 75 MG tablet Take 75 mg by mouth daily       diclofenac (VOLTAREN) 1 % topical gel Apply 2 g topically 4 times daily       lisinopril (ZESTRIL) 20 MG tablet Take 20 mg by mouth daily       miconazole (MICATIN) 2 % external powder Apply topically 2 times daily       senna-docusate (SENOKOT-S/PERICOLACE) 8.6-50 MG tablet Take 2 tablets by mouth daily       triamcinolone (KENALOG) 0.1 % external cream Apply topically as needed for irritation         Post Discharge Medication Reconciliation Status: discharge medications reconciled and changed, per note/orders.      Medication Changes/Rationale:  "    Amlodipine was discontinued due to low normal BP and leg edema    Controlled medications sent with patient:   None.     ROS:   10 point ROS of systems including Constitutional, Eyes, Respiratory, Cardiovascular, Gastroenterology, Genitourinary, Integumentary, Musculoskeletal, Psychiatric were all negative except for pertinent positives noted in my HPI.      PHYSICAL EXAM:  Vital signs were reviewed in the chart.  Vital Signs: /77   Pulse 66   Temp 98  F (36.7  C)   Resp 18   Ht 1.626 m (5' 4\")   Wt 105 kg (231 lb 6.4 oz)   SpO2 99%   BMI 39.72 kg/m    General: Frail appearing but comfortable and in no acute distress  HEENT: No conjunctival pallor; moist oral mucosa, no scleral icterus or injection  Cardiovascular: Normal S1, S2, RRR  Respiratory: Lungs clear to auscultation bilaterally  GI: Abdomen soft, non-tender, non-distended, +BS  Extremities: 1-2+ bilateral LE edema  Neuro: CX II-XII grossly intact; ROM in all four extremities grossly intact  Psych: Alert and oriented x1-2; normal affect  Skin: No acute rash    SNF labs:   BMP (Christie 3, 2022): Sodium 141, potassium 4.3, BUN 55, creatinine 1.2, calcium 9.6, glucose 107    CBC (May 26, 2022): White count 5.7, hemoglobin 11.3, MCV 94, platelet count 254,000      Most Recent 3 CBC's:Recent Labs   Lab Test 05/22/22  0713 05/20/22  0741 05/19/22  0740 05/18/22  0850 05/17/22  0757   WBC  --  4.4  --  3.6* 3.4*   HGB  --  11.2*  --  11.4* 11.1*   MCV  --  92  --  92 93    226 206 189 163     Most Recent 3 BMP's:Recent Labs   Lab Test 05/23/22  1111 05/23/22  0646 05/22/22  2129 05/22/22  0817 05/22/22  0713 05/19/22  0834 05/19/22  0740 05/18/22  1151 05/18/22  0850 05/17/22  0814 05/17/22  0757 05/16/22  0921 05/16/22  0748   NA  --   --   --   --   --   --   --   --  138  --  138  --  137   POTASSIUM  --   --   --   --   --   --   --   --  3.8  --  4.0  --  4.0   CHLORIDE  --   --   --   --   --   --   --   --  109  --  109  --  106   CO2  -- "   --   --   --   --   --   --   --  25  --  24  --  26   BUN  --   --   --   --   --   --   --   --  26  --  29  --  32*   CR  --   --   --   --  1.14*  --  0.96  --  0.89  --  0.92  --  1.08*   ANIONGAP  --   --   --   --   --   --   --   --  4  --  5  --  5   JN  --   --   --   --   --   --   --   --  9.2  --  8.7  --  8.4*   * 97 201*   < >  --    < > 99   < > 105*   < > 100*   < > 139*    < > = values in this interval not displayed.       DISCHARGE PLAN:    Follow up labs: No labs orders/due    Medical Follow Up:   Follow-up with PCP within 2 weeks     Discharge Services: Home PT, OT, and home health aide    Discharge Instructions Verbalized to Patient at Discharge:     None      TOTAL DISCHARGE TIME:   Greater than 30 minutes  Electronically signed by:  Sabino Ashton MD         Documentation of Face-to-Face and Certification for Home Health Services     Patient: Sarah Noble   YOB: 1934  MR Number: [id number]  Today's Date: 6/22/2022    I certify that patient: Sarah Noble is under my care and that I, or a nurse practitioner or physician's assistant working with me, had a face-to-face encounter that meets the physician face-to-face encounter requirements with this patient on: 6/22/2022.    This encounter with the patient was in whole, or in part, for the following medical condition, which is the primary reason for home health care: Cognitive impairment and physical deconditioning.    I certify that, based on my findings, the following services are medically necessary home health services: Occupational Therapy, Physical Therapy and Home health aide.    My clinical findings support the need for the above services because: Occupational Therapy Services are needed to assess and treat cognitive ability and address ADL safety due to impairment in Cognition. and Physical Therapy Services are needed to assess and treat the following functional impairments: Physical  deconditioning.    Further, I certify that my clinical findings support that this patient is homebound (i.e. absences from home require considerable and taxing effort and are for medical reasons or [employer] services or infrequently or of short duration when for other reasons) because: Requires assistance of another person or specialized equipment to access medical services because patient: Requires supervision of another for safe transfer...    Based on the above findings. I certify that this patient is confined to the home and needs intermittent skilled nursing care, physical therapy and/or speech therapy.  The patient is under my care, and I have initiated the establishment of the plan of care.  This patient will be followed by a physician who will periodically review the plan of care.  Physician/Provider to provide follow up care: Luther Banerjee    Responsible Medicare certified PECOS Physician: Sabino Ashton MD  Physician Signature: See electronic signature associated with these discharge orders.  Date: 6/22/2022        Disclaimer: This note may contain text created using speech-recognition software and may contain unintended word substitutions.

## 2022-06-22 ENCOUNTER — DISCHARGE SUMMARY NURSING HOME (OUTPATIENT)
Dept: GERIATRICS | Facility: CLINIC | Age: 87
End: 2022-06-22
Payer: MEDICARE

## 2022-06-22 VITALS
WEIGHT: 231.4 LBS | BODY MASS INDEX: 39.5 KG/M2 | HEART RATE: 66 BPM | DIASTOLIC BLOOD PRESSURE: 77 MMHG | OXYGEN SATURATION: 99 % | HEIGHT: 64 IN | SYSTOLIC BLOOD PRESSURE: 135 MMHG | RESPIRATION RATE: 18 BRPM | TEMPERATURE: 98 F

## 2022-06-22 DIAGNOSIS — N18.32 STAGE 3B CHRONIC KIDNEY DISEASE (H): ICD-10-CM

## 2022-06-22 DIAGNOSIS — M25.562 ACUTE PAIN OF BOTH KNEES: ICD-10-CM

## 2022-06-22 DIAGNOSIS — I10 ESSENTIAL HYPERTENSION: ICD-10-CM

## 2022-06-22 DIAGNOSIS — E11.9 DIET-CONTROLLED DIABETES MELLITUS (H): ICD-10-CM

## 2022-06-22 DIAGNOSIS — R60.0 BILATERAL LOWER EXTREMITY EDEMA: ICD-10-CM

## 2022-06-22 DIAGNOSIS — M47.816 SPONDYLOSIS OF LUMBAR SPINE: ICD-10-CM

## 2022-06-22 DIAGNOSIS — R41.89 COGNITIVE IMPAIRMENT: ICD-10-CM

## 2022-06-22 DIAGNOSIS — Z97.8 CHRONIC INDWELLING FOLEY CATHETER: ICD-10-CM

## 2022-06-22 DIAGNOSIS — D64.9 CHRONIC ANEMIA: ICD-10-CM

## 2022-06-22 DIAGNOSIS — M25.561 ACUTE PAIN OF BOTH KNEES: ICD-10-CM

## 2022-06-22 DIAGNOSIS — R53.81 PHYSICAL DECONDITIONING: ICD-10-CM

## 2022-06-22 DIAGNOSIS — U07.1 INFECTION DUE TO 2019 NOVEL CORONAVIRUS: Primary | ICD-10-CM

## 2022-06-22 DIAGNOSIS — Z86.73 HISTORY OF CVA (CEREBROVASCULAR ACCIDENT): ICD-10-CM

## 2022-06-22 DIAGNOSIS — E66.01 MORBID OBESITY (H): ICD-10-CM
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
## 2022-06-22 DIAGNOSIS — G47.33 OSA (OBSTRUCTIVE SLEEP APNEA): ICD-10-CM

## 2022-06-22 DIAGNOSIS — R33.9 URINARY RETENTION: ICD-10-CM

## 2022-06-22 PROCEDURE — 99316 NF DSCHRG MGMT 30 MIN+: CPT | Performed by: INTERNAL MEDICINE

## 2022-06-22 NOTE — LETTER
6/22/2022        RE: Sarah Noble  [address] [facility] [address]        [facility] DISCHARGE SUMMARY  PATIENT'S NAME: Sarah Noble  YOB: 1934  MEDICAL RECORD NUMBER:  [id number]  Place of Service where encounter took place:  [facility]    PRIMARY CARE PROVIDER AND CLINIC RESPONSIBLE AFTER TRANSFER:   Luther Banerjee MD, [facility] [address]  / [facility] [address]        Transferring providers: Sabino Ashton MD  Recent Hospitalization/ED:  [facility] stay from May 11 through May 23, 2022.  Date of SNF Admission: May 23, 2022  Date of SNF (anticipated) Discharge: June 25, 2022  Discharged to: previous independent home  Cognitive Scores: SLUMS: 10/30 (May 25, 2022), CPT score 4.6/5.6 (June 7, 2022)  Physical Function: Unavailable  DME: Walker    CODE STATUS/ADVANCE DIRECTIVES DISCUSSION:  Full Code     ALLERGIES: Aspirin    DISCHARGE DIAGNOSIS/NURSING FACILITY COURSE:   Sarah Noble is a 87 year old  (7/30/1934) female with past medical history is notable for hypertension, DM type II, CVA, CKD, urinary retention, UTI, gastric ulcer, spinal stenosis, osteoporosis, vitamin D deficiency, morbid obesity, and MILLIE.     She hospitalized at [facility] from May 11 through May 23, 2022 after presenting for bilateral knee pain subsequent to mechanical fall.  X-ray examination of knee revealed status post bilateral knee arthroplasty and no fractures.  CT scan of left knee showed no evidence for fracture or loosening.  Doppler study was negative for DVT.  MRI of the lumbar spine demonstrated multilevel spondylosis and multilevel severe neuroforaminal stenosis.  Patient was evaluated by orthopedic service and no surgical intervention was indicated. Notably, she was tested positive for COVID-19 on May 12 and she was treated with remdesivir for 3 days.  Chest ray showed no infiltrates and  patient remained asymptomatic. UA was abnormal on May 13, however urine culture grew 50,000-100,000 colonies per mL mixture of urogenital drew. TCU was recommended per therapies. Patient was then admitted to this facility for medical management, nursing care, and rehab.  She had significant progress in TCU unable to walk with a walker.  Her SLUMS score was 10/30.    Today's visit:  Patient was seen in her room, while sitting in a chair.  She appears frail but comfortable.  She clearly has cognitive impairment.  She reports no joint pain, cough, sputum, chest pain, palpitation, dyspnea, nausea, vomiting, abdominal pain, or urinary symptoms.  Notably she has a chronic Solorio catheter.  She reports that she had a bowel movement 2 days ago.    COVID-19 infection.  Vaccinated against COVID-19.  Tested positive for COVID-19 on May 12.  She received 3 days of remdesivir.  Remained asymptomatic.  Now recovered.  Plan:  As needed albuterol inhaler     Mechanical fall, subsequent encounter,  Bilateral knee pain, L>R,  Spinal spondylosis,  Chronic low back pain,  Physical deconditioning.  No evidence for fracture or loosening of knee arthroplasty.  Pain resolved and physical endurance improved with therapies.  Plan:  Continue pain management with acetaminophen and diclofenac gel  She is to continue with home PT/OT after discharge from TCU     Essential hypertension,  Bilateral lower extremity edema.   PTA amlodipine was discontinued due to soft blood pressures and leg edema.  Plan:  Continue PTA  lisinopril 20 mg p.o. daily and bumetanide 0.5 mg p.o. daily with hold parameters  Continue with leg wraps in TCU and discharge with compression stockings  Lymphedema therapy referral after discharge from TCU    DM type II; diet-controlled.  Last hemoglobin A1c was 6.6% on April 1, 2022.  Plan:  Continue diabetic diet  Follow-up as outpatient     CKD stage IIIb.  Baseline creatinine 1.1-1.3.  Last creatinine was stable at 1.2 on June  3.  Plan:  Avoid NSAIDs and nephrotoxins  Monitor as outpatient     Chronic anemia.  Baseline Hgb 11-12.  Last hemoglobin was stable at 11.3 on May 26.  Plan:  Monitor hemoglobin as outpatient     History of CVA.  Allergic to aspirin.  Plan:  Continue clopidogrel 75 mg p.o. daily     Urinary retention, s/p chronic indwelling Solorio catheter.  Plan:  Continue Solorio catheter  Follow-up with urology as directed     Cognitive impairment.  SLUMS score 10/30 on May 25, 2022.     Morbid obesity, BMI 39.7,  MILLIE.  Not on CPAP.           Past Medical History:   Cognitive impairment; SLUMS 10/30 on May 25, 2022  COVID-19 infection in May 2022  Hypertension  DM type II; diet-controlled  CKD stage IIIb, baseline creatinine 1.1-1.3  Chronic anemia, baseline Hgb 11-12   CVA  Urinary retention, s/p chronic indwelling Solorio catheter  Recurrent UTIs  Gastric ulcer  BPPV  Spinal spondylosis  Chronic low back pain  Osteoporosis  Vitamin D deficiency  MILLIE; not on CPAP  Morbid obesity, BMI 38    Discharge Medications:  Current Outpatient Medications   Medication Sig Dispense Refill     acetaminophen (TYLENOL) 500 MG tablet Take 1,000 mg by mouth At Bedtime For pain       albuterol (PROAIR HFA/PROVENTIL HFA/VENTOLIN HFA) 108 (90 Base) MCG/ACT inhaler Inhale 2 puffs into the lungs every 4 hours as needed for other (Bronchospasm) 18 g      bumetanide (BUMEX) 0.5 MG tablet Take 0.5 mg by mouth daily       clopidogrel (PLAVIX) 75 MG tablet Take 75 mg by mouth daily       diclofenac (VOLTAREN) 1 % topical gel Apply 2 g topically 4 times daily       lisinopril (ZESTRIL) 20 MG tablet Take 20 mg by mouth daily       miconazole (MICATIN) 2 % external powder Apply topically 2 times daily       senna-docusate (SENOKOT-S/PERICOLACE) 8.6-50 MG tablet Take 2 tablets by mouth daily       triamcinolone (KENALOG) 0.1 % external cream Apply topically as needed for irritation         Post Discharge Medication Reconciliation Status: discharge medications  "reconciled and changed, per note/orders.      Medication Changes/Rationale:     Amlodipine was discontinued due to low normal BP and leg edema    Controlled medications sent with patient:   None.     ROS:   10 point ROS of systems including Constitutional, Eyes, Respiratory, Cardiovascular, Gastroenterology, Genitourinary, Integumentary, Musculoskeletal, Psychiatric were all negative except for pertinent positives noted in my HPI.      PHYSICAL EXAM:  Vital signs were reviewed in the chart.  Vital Signs: /77   Pulse 66   Temp 98  F (36.7  C)   Resp 18   Ht 1.626 m (5' 4\")   Wt 105 kg (231 lb 6.4 oz)   SpO2 99%   BMI 39.72 kg/m    General: Frail appearing but comfortable and in no acute distress  HEENT: No conjunctival pallor; moist oral mucosa, no scleral icterus or injection  Cardiovascular: Normal S1, S2, RRR  Respiratory: Lungs clear to auscultation bilaterally  GI: Abdomen soft, non-tender, non-distended, +BS  Extremities: 1-2+ bilateral LE edema  Neuro: CX II-XII grossly intact; ROM in all four extremities grossly intact  Psych: Alert and oriented x1-2; normal affect  Skin: No acute rash    SNF labs:   BMP (Christie 3, 2022): Sodium 141, potassium 4.3, BUN 55, creatinine 1.2, calcium 9.6, glucose 107    CBC (May 26, 2022): White count 5.7, hemoglobin 11.3, MCV 94, platelet count 254,000      Most Recent 3 CBC's:Recent Labs   Lab Test 05/22/22  0713 05/20/22  0741 05/19/22  0740 05/18/22  0850 05/17/22  0757   WBC  --  4.4  --  3.6* 3.4*   HGB  --  11.2*  --  11.4* 11.1*   MCV  --  92  --  92 93    226 206 189 163     Most Recent 3 BMP's:Recent Labs   Lab Test 05/23/22  1111 05/23/22  0646 05/22/22  2129 05/22/22  0817 05/22/22  0713 05/19/22  0834 05/19/22  0740 05/18/22  1151 05/18/22  0850 05/17/22  0814 05/17/22  0757 05/16/22  0921 05/16/22  0748   NA  --   --   --   --   --   --   --   --  138  --  138  --  137   POTASSIUM  --   --   --   --   --   --   --   --  3.8  --  4.0  --  4.0 "   CHLORIDE  --   --   --   --   --   --   --   --  109  --  109  --  106   CO2  --   --   --   --   --   --   --   --  25  --  24  --  26   BUN  --   --   --   --   --   --   --   --  26  --  29  --  32*   CR  --   --   --   --  1.14*  --  0.96  --  0.89  --  0.92  --  1.08*   ANIONGAP  --   --   --   --   --   --   --   --  4  --  5  --  5   JN  --   --   --   --   --   --   --   --  9.2  --  8.7  --  8.4*   * 97 201*   < >  --    < > 99   < > 105*   < > 100*   < > 139*    < > = values in this interval not displayed.       DISCHARGE PLAN:    Follow up labs: No labs orders/due    Medical Follow Up:   Follow-up with PCP within 2 weeks     Discharge Services: Home PT, OT, and home health aide    Discharge Instructions Verbalized to Patient at Discharge:     None      TOTAL DISCHARGE TIME:   Greater than 30 minutes  Electronically signed by:  Sabino Ashton MD         Documentation of Face-to-Face and Certification for Home Health Services     Patient: Sarah Noble   YOB: 1934  MR Number: [id number]  Today's Date: 6/22/2022    I certify that patient: Sarah Noble is under my care and that I, or a nurse practitioner or physician's assistant working with me, had a face-to-face encounter that meets the physician face-to-face encounter requirements with this patient on: 6/22/2022.    This encounter with the patient was in whole, or in part, for the following medical condition, which is the primary reason for home health care: Cognitive impairment and physical deconditioning.    I certify that, based on my findings, the following services are medically necessary home health services: Occupational Therapy, Physical Therapy and Home health aide.    My clinical findings support the need for the above services because: Occupational Therapy Services are needed to assess and treat cognitive ability and address ADL safety due to impairment in Cognition. and Physical Therapy Services are needed  to assess and treat the following functional impairments: Physical deconditioning.    Further, I certify that my clinical findings support that this patient is homebound (i.e. absences from home require considerable and taxing effort and are for medical reasons or [employer] services or infrequently or of short duration when for other reasons) because: Requires assistance of another person or specialized equipment to access medical services because patient: Requires supervision of another for safe transfer...    Based on the above findings. I certify that this patient is confined to the home and needs intermittent skilled nursing care, physical therapy and/or speech therapy.  The patient is under my care, and I have initiated the establishment of the plan of care.  This patient will be followed by a physician who will periodically review the plan of care.  Physician/Provider to provide follow up care: Luther Banerjee    Responsible Medicare certified PECOS Physician: Sabino Ashton MD  Physician Signature: See electronic signature associated with these discharge orders.  Date: 6/22/2022        Disclaimer: This note may contain text created using speech-recognition software and may contain unintended word substitutions.            Sincerely,        Sabino Ashton MD

## 2023-01-01 ENCOUNTER — DISCHARGE SUMMARY NURSING HOME (OUTPATIENT)
Dept: GERIATRICS | Facility: CLINIC | Age: 88
End: 2023-01-01
Payer: MEDICARE

## 2023-01-01 ENCOUNTER — TRANSITIONAL CARE UNIT VISIT (OUTPATIENT)
Dept: GERIATRICS | Facility: CLINIC | Age: 88
End: 2023-01-01
Payer: MEDICARE

## 2023-01-01 ENCOUNTER — LAB REQUISITION (OUTPATIENT)
Dept: LAB | Facility: CLINIC | Age: 88
End: 2023-01-01
Payer: MEDICARE

## 2023-01-01 ENCOUNTER — HOSPITAL ENCOUNTER (OUTPATIENT)
Dept: WOUND CARE | Facility: CLINIC | Age: 88
Discharge: HOME | End: 2023-05-03
Attending: PHYSICIAN ASSISTANT
Payer: MEDICARE

## 2023-01-01 VITALS
TEMPERATURE: 97 F | HEART RATE: 58 BPM | HEIGHT: 63 IN | BODY MASS INDEX: 39.44 KG/M2 | RESPIRATION RATE: 18 BRPM | WEIGHT: 222.6 LBS | OXYGEN SATURATION: 97 % | SYSTOLIC BLOOD PRESSURE: 138 MMHG | DIASTOLIC BLOOD PRESSURE: 74 MMHG

## 2023-01-01 VITALS
SYSTOLIC BLOOD PRESSURE: 146 MMHG | HEIGHT: 63 IN | HEART RATE: 64 BPM | TEMPERATURE: 97.8 F | DIASTOLIC BLOOD PRESSURE: 80 MMHG | BODY MASS INDEX: 39.55 KG/M2 | RESPIRATION RATE: 18 BRPM | WEIGHT: 223.2 LBS | OXYGEN SATURATION: 98 %

## 2023-01-01 VITALS
SYSTOLIC BLOOD PRESSURE: 123 MMHG | HEART RATE: 57 BPM | HEIGHT: 63 IN | WEIGHT: 225.2 LBS | DIASTOLIC BLOOD PRESSURE: 79 MMHG | BODY MASS INDEX: 39.9 KG/M2 | TEMPERATURE: 98.1 F | RESPIRATION RATE: 18 BRPM | OXYGEN SATURATION: 98 %

## 2023-01-01 VITALS
SYSTOLIC BLOOD PRESSURE: 138 MMHG | RESPIRATION RATE: 18 BRPM | BODY MASS INDEX: 40.08 KG/M2 | TEMPERATURE: 98 F | OXYGEN SATURATION: 98 % | WEIGHT: 226.2 LBS | HEIGHT: 63 IN | DIASTOLIC BLOOD PRESSURE: 78 MMHG | HEART RATE: 58 BPM

## 2023-01-01 VITALS
OXYGEN SATURATION: 97 % | HEART RATE: 67 BPM | WEIGHT: 232.2 LBS | SYSTOLIC BLOOD PRESSURE: 146 MMHG | DIASTOLIC BLOOD PRESSURE: 62 MMHG | HEIGHT: 63 IN | HEIGHT: 63 IN | TEMPERATURE: 98.7 F | BODY MASS INDEX: 41.14 KG/M2 | OXYGEN SATURATION: 95 % | RESPIRATION RATE: 20 BRPM | BODY MASS INDEX: 41.14 KG/M2 | WEIGHT: 232.2 LBS | SYSTOLIC BLOOD PRESSURE: 163 MMHG | HEART RATE: 57 BPM | TEMPERATURE: 97.7 F | RESPIRATION RATE: 18 BRPM | DIASTOLIC BLOOD PRESSURE: 97 MMHG

## 2023-01-01 VITALS
WEIGHT: 223.2 LBS | HEART RATE: 55 BPM | HEIGHT: 63 IN | RESPIRATION RATE: 18 BRPM | BODY MASS INDEX: 39.55 KG/M2 | TEMPERATURE: 97.7 F | OXYGEN SATURATION: 95 % | SYSTOLIC BLOOD PRESSURE: 163 MMHG | DIASTOLIC BLOOD PRESSURE: 85 MMHG

## 2023-01-01 VITALS
WEIGHT: 225.6 LBS | RESPIRATION RATE: 16 BRPM | TEMPERATURE: 97.6 F | HEART RATE: 54 BPM | SYSTOLIC BLOOD PRESSURE: 162 MMHG | BODY MASS INDEX: 39.97 KG/M2 | HEIGHT: 63 IN | OXYGEN SATURATION: 99 % | DIASTOLIC BLOOD PRESSURE: 88 MMHG

## 2023-01-01 DIAGNOSIS — N39.0 URINARY TRACT INFECTION ASSOCIATED WITH INDWELLING URETHRAL CATHETER, SUBSEQUENT ENCOUNTER: ICD-10-CM

## 2023-01-01 DIAGNOSIS — N18.32 STAGE 3B CHRONIC KIDNEY DISEASE (CKD) (H): ICD-10-CM

## 2023-01-01 DIAGNOSIS — E11.9 DIET-CONTROLLED DIABETES MELLITUS (H): ICD-10-CM

## 2023-01-01 DIAGNOSIS — E11.22 TYPE 2 DIABETES MELLITUS WITH STAGE 3A CHRONIC KIDNEY DISEASE, WITHOUT LONG-TERM CURRENT USE OF INSULIN (H): ICD-10-CM

## 2023-01-01 DIAGNOSIS — N18.30 STAGE 3 CHRONIC KIDNEY DISEASE, UNSPECIFIED WHETHER STAGE 3A OR 3B CKD (H): ICD-10-CM

## 2023-01-01 DIAGNOSIS — N18.31 TYPE 2 DIABETES MELLITUS WITH STAGE 3A CHRONIC KIDNEY DISEASE, WITHOUT LONG-TERM CURRENT USE OF INSULIN (H): ICD-10-CM

## 2023-01-01 DIAGNOSIS — E66.01 MORBID OBESITY (H): ICD-10-CM

## 2023-01-01 DIAGNOSIS — I10 BENIGN ESSENTIAL HYPERTENSION: ICD-10-CM

## 2023-01-01 DIAGNOSIS — F03.90 DEMENTIA, UNSPECIFIED DEMENTIA SEVERITY, UNSPECIFIED DEMENTIA TYPE, UNSPECIFIED WHETHER BEHAVIORAL, PSYCHOTIC, OR MOOD DISTURBANCE OR ANXIETY (H): ICD-10-CM

## 2023-01-01 DIAGNOSIS — G89.29 CHRONIC LOW BACK PAIN, UNSPECIFIED BACK PAIN LATERALITY, UNSPECIFIED WHETHER SCIATICA PRESENT: ICD-10-CM

## 2023-01-01 DIAGNOSIS — T83.511D URINARY TRACT INFECTION ASSOCIATED WITH INDWELLING URETHRAL CATHETER, SUBSEQUENT ENCOUNTER: ICD-10-CM

## 2023-01-01 DIAGNOSIS — G47.33 OSA (OBSTRUCTIVE SLEEP APNEA): ICD-10-CM

## 2023-01-01 DIAGNOSIS — F03.B0 MODERATE DEMENTIA WITHOUT BEHAVIORAL DISTURBANCE (H): Primary | ICD-10-CM

## 2023-01-01 DIAGNOSIS — N18.32 STAGE 3B CHRONIC KIDNEY DISEASE (H): ICD-10-CM

## 2023-01-01 DIAGNOSIS — I10 ESSENTIAL HYPERTENSION: ICD-10-CM

## 2023-01-01 DIAGNOSIS — E78.5 HYPERLIPIDEMIA, UNSPECIFIED: ICD-10-CM

## 2023-01-01 DIAGNOSIS — I10 ESSENTIAL (PRIMARY) HYPERTENSION: ICD-10-CM

## 2023-01-01 DIAGNOSIS — R60.0 BILATERAL LOWER EXTREMITY EDEMA: ICD-10-CM

## 2023-01-01 DIAGNOSIS — R73.09 OTHER ABNORMAL GLUCOSE: ICD-10-CM

## 2023-01-01 DIAGNOSIS — M1A.3710 CHRONIC GOUT OF RIGHT FOOT DUE TO RENAL IMPAIRMENT WITHOUT TOPHUS: ICD-10-CM

## 2023-01-01 DIAGNOSIS — R33.9 URINARY RETENTION: ICD-10-CM

## 2023-01-01 DIAGNOSIS — E66.01 MORBID OBESITY (H): Primary | ICD-10-CM

## 2023-01-01 DIAGNOSIS — M62.81 GENERALIZED MUSCLE WEAKNESS: ICD-10-CM

## 2023-01-01 DIAGNOSIS — M25.561 ACUTE PAIN OF RIGHT KNEE: Primary | ICD-10-CM

## 2023-01-01 DIAGNOSIS — Z86.73 HISTORY OF CVA (CEREBROVASCULAR ACCIDENT): ICD-10-CM

## 2023-01-01 DIAGNOSIS — L89.613 PRESSURE ULCER OF RIGHT HEEL, STAGE 3 (H): ICD-10-CM

## 2023-01-01 DIAGNOSIS — D64.9 CHRONIC ANEMIA: ICD-10-CM

## 2023-01-01 DIAGNOSIS — E03.9 HYPOTHYROIDISM, UNSPECIFIED: ICD-10-CM

## 2023-01-01 DIAGNOSIS — M54.50 CHRONIC LOW BACK PAIN, UNSPECIFIED BACK PAIN LATERALITY, UNSPECIFIED WHETHER SCIATICA PRESENT: ICD-10-CM

## 2023-01-01 DIAGNOSIS — M10.072 ACUTE IDIOPATHIC GOUT OF LEFT FOOT: ICD-10-CM

## 2023-01-01 DIAGNOSIS — Z97.8 INDWELLING FOLEY CATHETER PRESENT: ICD-10-CM

## 2023-01-01 DIAGNOSIS — R53.81 PHYSICAL DECONDITIONING: ICD-10-CM

## 2023-01-01 LAB
ALBUMIN SERPL BCG-MCNC: 3 G/DL (ref 3.5–5.2)
ALP SERPL-CCNC: 74 U/L (ref 35–104)
ALT SERPL W P-5'-P-CCNC: 5 U/L (ref 0–50)
ANION GAP SERPL CALCULATED.3IONS-SCNC: 12 MMOL/L (ref 7–15)
AST SERPL W P-5'-P-CCNC: 21 U/L (ref 0–45)
BASO+EOS+MONOS # BLD AUTO: ABNORMAL 10*3/UL
BASO+EOS+MONOS NFR BLD AUTO: ABNORMAL %
BASOPHILS # BLD AUTO: 0 10E3/UL (ref 0–0.2)
BASOPHILS NFR BLD AUTO: 1 %
BILIRUB SERPL-MCNC: 0.3 MG/DL
BUN SERPL-MCNC: 14.9 MG/DL (ref 8–23)
CALCIUM SERPL-MCNC: 10 MG/DL (ref 8.8–10.2)
CHLORIDE SERPL-SCNC: 105 MMOL/L (ref 98–107)
CHOLEST SERPL-MCNC: 93 MG/DL
CREAT SERPL-MCNC: 0.97 MG/DL (ref 0.51–0.95)
DEPRECATED HCO3 PLAS-SCNC: 23 MMOL/L (ref 22–29)
EGFRCR SERPLBLD CKD-EPI 2021: 56 ML/MIN/1.73M2
EOSINOPHIL # BLD AUTO: 0.3 10E3/UL (ref 0–0.7)
EOSINOPHIL NFR BLD AUTO: 4 %
ERYTHROCYTE [DISTWIDTH] IN BLOOD BY AUTOMATED COUNT: 15.8 % (ref 10–15)
GLUCOSE SERPL-MCNC: 163 MG/DL (ref 70–99)
HBA1C MFR BLD: 7.3 %
HCT VFR BLD AUTO: 31.2 % (ref 35–47)
HDLC SERPL-MCNC: 38 MG/DL
HGB BLD-MCNC: 9.8 G/DL (ref 11.7–15.7)
IMM GRANULOCYTES # BLD: 0.1 10E3/UL
IMM GRANULOCYTES NFR BLD: 1 %
LDLC SERPL CALC-MCNC: 9 MG/DL
LYMPHOCYTES # BLD AUTO: 1.5 10E3/UL (ref 0.8–5.3)
LYMPHOCYTES NFR BLD AUTO: 19 %
MCH RBC QN AUTO: 29.2 PG (ref 26.5–33)
MCHC RBC AUTO-ENTMCNC: 31.4 G/DL (ref 31.5–36.5)
MCV RBC AUTO: 93 FL (ref 78–100)
MONOCYTES # BLD AUTO: 0.7 10E3/UL (ref 0–1.3)
MONOCYTES NFR BLD AUTO: 9 %
NEUTROPHILS # BLD AUTO: 5.3 10E3/UL (ref 1.6–8.3)
NEUTROPHILS NFR BLD AUTO: 66 %
NONHDLC SERPL-MCNC: 55 MG/DL
NRBC # BLD AUTO: 0 10E3/UL
NRBC BLD AUTO-RTO: 0 /100
PLATELET # BLD AUTO: 277 10E3/UL (ref 150–450)
POTASSIUM SERPL-SCNC: 4 MMOL/L (ref 3.4–5.3)
PROT SERPL-MCNC: 6.3 G/DL (ref 6.4–8.3)
RBC # BLD AUTO: 3.36 10E6/UL (ref 3.8–5.2)
SODIUM SERPL-SCNC: 140 MMOL/L (ref 135–145)
TRIGL SERPL-MCNC: 229 MG/DL
TSH SERPL DL<=0.005 MIU/L-ACNC: 2.67 UIU/ML (ref 0.3–4.2)
VIT B12 SERPL-MCNC: 571 PG/ML (ref 232–1245)
VIT D+METAB SERPL-MCNC: 10 NG/ML (ref 20–50)
WBC # BLD AUTO: 7.9 10E3/UL (ref 4–11)

## 2023-01-01 PROCEDURE — 80061 LIPID PANEL: CPT | Mod: ORL

## 2023-01-01 PROCEDURE — 99309 SBSQ NF CARE MODERATE MDM 30: CPT | Performed by: NURSE PRACTITIONER

## 2023-01-01 PROCEDURE — 36415 COLL VENOUS BLD VENIPUNCTURE: CPT | Mod: ORL

## 2023-01-01 PROCEDURE — 82306 VITAMIN D 25 HYDROXY: CPT | Mod: ORL

## 2023-01-01 PROCEDURE — P9603 ONE-WAY ALLOW PRORATED MILES: HCPCS | Mod: ORL

## 2023-01-01 PROCEDURE — 84443 ASSAY THYROID STIM HORMONE: CPT | Mod: ORL

## 2023-01-01 PROCEDURE — 99305 1ST NF CARE MODERATE MDM 35: CPT | Performed by: INTERNAL MEDICINE

## 2023-01-01 PROCEDURE — 99305 1ST NF CARE MODERATE MDM 35: CPT | Performed by: PHYSICIAN ASSISTANT

## 2023-01-01 PROCEDURE — 80053 COMPREHEN METABOLIC PANEL: CPT | Mod: ORL

## 2023-01-01 PROCEDURE — 99310 SBSQ NF CARE HIGH MDM 45: CPT | Performed by: NURSE PRACTITIONER

## 2023-01-01 PROCEDURE — 83036 HEMOGLOBIN GLYCOSYLATED A1C: CPT | Mod: ORL

## 2023-01-01 PROCEDURE — 99316 NF DSCHRG MGMT 30 MIN+: CPT | Performed by: NURSE PRACTITIONER

## 2023-01-01 PROCEDURE — 82607 VITAMIN B-12: CPT | Mod: ORL

## 2023-01-01 PROCEDURE — 85025 COMPLETE CBC W/AUTO DIFF WBC: CPT | Mod: ORL

## 2023-01-01 RX ORDER — LIDOCAINE 50 MG/G
PATCH TOPICAL EVERY 24 HOURS
COMMUNITY

## 2023-01-01 RX ORDER — PREDNISONE 20 MG/1
20 TABLET ORAL DAILY
COMMUNITY
Start: 2023-01-01 | End: 2023-01-01

## 2023-01-01 RX ORDER — CLOPIDOGREL BISULFATE 75 MG/1
75 TABLET ORAL DAILY
Qty: 30 TABLET | Refills: 0 | Status: SHIPPED | OUTPATIENT
Start: 2023-01-01

## 2023-01-01 RX ORDER — NYSTATIN 100000 [USP'U]/G
POWDER TOPICAL
COMMUNITY

## 2023-01-01 RX ORDER — NITROFURANTOIN MACROCRYSTAL 100 MG
100 CAPSULE ORAL 2 TIMES DAILY
COMMUNITY
Start: 2023-01-01 | End: 2023-01-01

## 2023-01-01 RX ORDER — AMLODIPINE BESYLATE 2.5 MG/1
2.5 TABLET ORAL DAILY
COMMUNITY
End: 2023-01-01

## 2023-01-01 RX ORDER — AMLODIPINE BESYLATE 5 MG/1
2.5 TABLET ORAL DAILY
COMMUNITY
End: 2023-01-01

## 2023-01-01 RX ORDER — AMLODIPINE BESYLATE 5 MG/1
2.5 TABLET ORAL DAILY
Qty: 30 TABLET | Refills: 0 | Status: SHIPPED | OUTPATIENT
Start: 2023-01-01

## 2023-04-10 ENCOUNTER — LAB REQUISITION (OUTPATIENT)
Dept: LAB | Facility: CLINIC | Age: 88
End: 2023-04-10

## 2023-04-10 DIAGNOSIS — Z00.01 ENCOUNTER FOR GENERAL ADULT MEDICAL EXAMINATION WITH ABNORMAL FINDINGS: ICD-10-CM

## 2023-04-11 ENCOUNTER — LAB REQUISITION (OUTPATIENT)
Dept: LAB | Facility: CLINIC | Age: 88
End: 2023-04-11

## 2023-04-11 DIAGNOSIS — N18.9 CHRONIC KIDNEY DISEASE, UNSPECIFIED: ICD-10-CM

## 2023-04-11 PROBLEM — M1A.3710 CHRONIC GOUT OF RIGHT FOOT DUE TO RENAL IMPAIRMENT WITHOUT TOPHUS: Status: ACTIVE | Noted: 2023-01-01

## 2023-04-11 PROBLEM — G47.33 OSA (OBSTRUCTIVE SLEEP APNEA): Status: ACTIVE | Noted: 2023-01-01

## 2023-04-11 PROBLEM — T83.511A URINARY TRACT INFECTION ASSOCIATED WITH INDWELLING URETHRAL CATHETER (H): Status: ACTIVE | Noted: 2023-01-01

## 2023-04-11 PROBLEM — N18.31 TYPE 2 DIABETES MELLITUS WITH STAGE 3A CHRONIC KIDNEY DISEASE, WITHOUT LONG-TERM CURRENT USE OF INSULIN (H): Status: ACTIVE | Noted: 2023-01-01

## 2023-04-11 PROBLEM — E66.01 MORBID OBESITY (H): Status: ACTIVE | Noted: 2023-01-01

## 2023-04-11 PROBLEM — F03.B0 MODERATE DEMENTIA WITHOUT BEHAVIORAL DISTURBANCE (H): Status: ACTIVE | Noted: 2023-01-01

## 2023-04-11 PROBLEM — E11.22 TYPE 2 DIABETES MELLITUS WITH STAGE 3A CHRONIC KIDNEY DISEASE, WITHOUT LONG-TERM CURRENT USE OF INSULIN (H): Status: ACTIVE | Noted: 2023-01-01

## 2023-04-11 PROBLEM — N39.0 URINARY TRACT INFECTION ASSOCIATED WITH INDWELLING URETHRAL CATHETER (H): Status: ACTIVE | Noted: 2023-01-01

## 2023-04-11 PROBLEM — N18.32 STAGE 3B CHRONIC KIDNEY DISEASE (H): Status: ACTIVE | Noted: 2023-01-01

## 2023-04-11 PROCEDURE — P9604 ONE-WAY ALLOW PRORATED TRIP: HCPCS | Performed by: NURSE PRACTITIONER

## 2023-04-11 PROCEDURE — 86481 TB AG RESPONSE T-CELL SUSP: CPT | Performed by: NURSE PRACTITIONER

## 2023-04-11 PROCEDURE — 36415 COLL VENOUS BLD VENIPUNCTURE: CPT | Performed by: NURSE PRACTITIONER

## 2023-04-11 NOTE — PROGRESS NOTES
[facility] GERIATRICS    PRIMARY CARE PROVIDER AND CLINIC:  Luther Banerjee MD, [facility] [address] [place] [address] / [facility] [id number]  Chief Complaint   Patient presents with     Hospital F/U      [place] Medical Record Number:  [id number]  Place of Service where encounter took place:  [facility] ([facility]) [[id number]]    Sarah Noble  is a 88 year old  (7/30/1934), admitted to the above facility from  [facility] . Hospital stay 4/5/23 through 4/7/23..   HPI:    This is a 87 yo female with PMHx for MILLIE, CKD stge 3a, CVA, Htn, chronic joseph who presented with weakness. Found to have UTI, given course of macrobid, also given prednisone per Gout flare. Of note PTA bumex on hold until prednisone course completed on 4/12/23. No other changes, discharged to [facility] for rehab.    CODE STATUS/ADVANCE DIRECTIVES DISCUSSION:  Prior  DNR only  ALLERGIES:   Allergies   Allergen Reactions     Aspirin      Sulfa Antibiotics [Sulfa Drugs]       PAST MEDICAL HISTORY:   Past Medical History:   Diagnosis Date     Chronic kidney disease, stage III (moderate) (H)      Diabetes (H)      Hypertension      MILLIE (obstructive sleep apnea)      Urinary retention     Indwelling Joseph catheter      PAST SURGICAL HISTORY:   has a past surgical history that includes orthopedic surgery.  FAMILY HISTORY: family history includes Alzheimer Disease in her mother; Heart Disease in her father.  SOCIAL HISTORY:   reports that she has never smoked. She has never used smokeless tobacco. She reports that she does not currently use alcohol. She reports that she does not currently use drugs.  Patient's living condition: lives with family, DAUGHTER      Post Discharge Medication Reconciliation Status:   MED REC REQUIRED  Post Medication Reconciliation Status:  Discharge medications reconciled and changed, see notes/orders         Current Outpatient Medications   Medication Sig     acetaminophen (TYLENOL) 500 MG tablet Take 500  "mg by mouth every 6 hours as needed For pain     amLODIPine (NORVASC) 2.5 MG tablet Take 2.5 mg by mouth daily     [START ON 4/13/2023] bumetanide (BUMEX) 0.5 MG tablet Take 0.5 mg by mouth daily     clopidogrel (PLAVIX) 75 MG tablet Take 75 mg by mouth daily     lidocaine (LIDODERM) 5 % patch Place onto the skin every 24 hours To prevent lidocaine toxicity, patient should be patch free for 12 hrs daily.     lisinopril (ZESTRIL) 20 MG tablet Take 20 mg by mouth daily     nitroFURantoin macrocrystal (MACRODANTIN) 100 MG capsule Take 100 mg by mouth 2 times daily     nystatin (MYCOSTATIN) 413410 UNIT/GM external powder Apply to abdominal and groin folds topically as needed for Candidiasis related to CANDIDIASIS OF SKIN AND NAIL (B37.2) BID PRN AND Apply to abdominal and groin folds topically every day and evening shift for candidiasis     predniSONE (DELTASONE) 20 MG tablet Take 20 mg by mouth daily     No current facility-administered medications for this visit.       ROS:  10 point ROS of systems including Constitutional, Eyes, Respiratory, Cardiovascular, Gastroenterology, Genitourinary, Integumentary, Musculoskeletal, Psychiatric were all negative except for pertinent positives noted in my HPI.    Vitals:  BP (!) 146/62   Pulse 57   Temp 97.7  F (36.5  C)   Resp 18   Ht 1.6 m (5' 3\")   Wt 105.3 kg (232 lb 3.2 oz)   SpO2 97%   BMI 41.13 kg/m    Exam:  GENERAL APPEARANCE:  Alert, cooperative, denies pain  ENT:  Mouth and posterior oropharynx normal, moist mucous membranes, normal hearing acuity  NECK:  No adenopathy,masses or thyromegaly  RESP:  lungs clear to auscultation , no respiratory distress  CV:  regular rate and rhythm, no murmur, rub, or gallop, peripheral edema 1+ in BLE, non-pitting  ABDOMEN:  normal bowel sounds, soft, nontender, no hepatosplenomegaly or other masses  :    joseph patent  M/S:   Able to move all extremities  SKIN:  Inspection of skin and subcutaneous tissue baseline  NEURO:   No " focal deficits  PSYCH:  oriented to 1/2    Lab/Diagnostic data:  Na 138  K 3.9  Cr 1.19  GFR 44  WBC 8.1  Hgb 10.3  MCV 93      ASSESSMENT/PLAN:     (T83.511D,  N39.0) Urinary tract infection associated with indwelling urethral catheter, subsequent encounter  Given course of macrobid x5d, until 4/12. Solorio changed on 4/5    (E11.22,  N18.31) Type 2 diabetes mellitus with stage 3a chronic kidney disease, without long-term current use of insulin (H)  Last A1c 6.5 in 10/2022, diet controlled    HTN  Continue amlodipine 2.5mg daily and lisinopril 20mg daily. Monitor BP BID. 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

## 2023-04-12 LAB
GAMMA INTERFERON BACKGROUND BLD IA-ACNC: 0.03 IU/ML
M TB IFN-G BLD-IMP: NEGATIVE
M TB IFN-G CD4+ BCKGRND COR BLD-ACNC: 5.17 IU/ML
MITOGEN IGNF BCKGRD COR BLD-ACNC: 0 IU/ML
MITOGEN IGNF BCKGRD COR BLD-ACNC: 0.02 IU/ML
QUANTIFERON MITOGEN: 5.2 IU/ML
QUANTIFERON NIL TUBE: 0.03 IU/ML
QUANTIFERON TB1 TUBE: 0.03 IU/ML
QUANTIFERON TB2 TUBE: 0.05

## 2023-04-13 LAB
ANION GAP SERPL CALCULATED.3IONS-SCNC: 9 MMOL/L (ref 7–15)
BUN SERPL-MCNC: 33.6 MG/DL (ref 8–23)
CALCIUM SERPL-MCNC: 9.9 MG/DL (ref 8.8–10.2)
CHLORIDE SERPL-SCNC: 103 MMOL/L (ref 98–107)
CREAT SERPL-MCNC: 1.4 MG/DL (ref 0.51–0.95)
DEPRECATED HCO3 PLAS-SCNC: 26 MMOL/L (ref 22–29)
ERYTHROCYTE [DISTWIDTH] IN BLOOD BY AUTOMATED COUNT: 12.2 % (ref 10–15)
GFR SERPL CREATININE-BSD FRML MDRD: 36 ML/MIN/1.73M2
GLUCOSE SERPL-MCNC: 151 MG/DL (ref 70–99)
HCT VFR BLD AUTO: 36 % (ref 35–47)
HGB BLD-MCNC: 11.3 G/DL (ref 11.7–15.7)
MCH RBC QN AUTO: 29.7 PG (ref 26.5–33)
MCHC RBC AUTO-ENTMCNC: 31.4 G/DL (ref 31.5–36.5)
MCV RBC AUTO: 95 FL (ref 78–100)
PLATELET # BLD AUTO: 335 10E3/UL (ref 150–450)
POTASSIUM SERPL-SCNC: 4.6 MMOL/L (ref 3.4–5.3)
RBC # BLD AUTO: 3.81 10E6/UL (ref 3.8–5.2)
SODIUM SERPL-SCNC: 138 MMOL/L (ref 136–145)
WBC # BLD AUTO: 12.5 10E3/UL (ref 4–11)

## 2023-04-13 PROCEDURE — 85027 COMPLETE CBC AUTOMATED: CPT | Performed by: NURSE PRACTITIONER

## 2023-04-13 PROCEDURE — 82310 ASSAY OF CALCIUM: CPT | Performed by: NURSE PRACTITIONER

## 2023-04-13 PROCEDURE — 36415 COLL VENOUS BLD VENIPUNCTURE: CPT | Performed by: NURSE PRACTITIONER

## 2023-04-13 PROCEDURE — P9604 ONE-WAY ALLOW PRORATED TRIP: HCPCS | Performed by: NURSE PRACTITIONER

## 2023-04-13 NOTE — PROGRESS NOTES
[facility]  INITIAL VISIT NOTE  April 14, 2023    PRIMARY CARE PROVIDER AND CLINIC:  Luther Banerjee [facility] [address] [place] [address] / [facility] [id number]    CHIEF COMPLAINT:  Hospital follow-up/Initial visit    HPI:    Sarah Noble is a 88 year old  (7/30/1934) female who was seen at [place] [facility] on April 14, 2023 for an initial visit.     Medical history is notable for dementia, hypertension, DM type II, CVA, CKD, chronic urinary retention, recurrent UTIs, gastric ulcer, spinal stenosis, osteoporosis, vitamin D deficiency, morbid obesity, MILLIE, and COVID-19 infection.    Summary of hospital course:  Patient was hospitalized at [facility] from April 5 through April 7, 2023 for generalized weakness, right knee pain, and UTI.  X-ray of the right foot/right knee showed right knee arthroplasty with possible resurfacing and no hardware failure, old fracture deformity of proximal right fibular diaphysis, right pes planus and hammertoe deformities, and old fracture deformity of proximal phalanx of right fifth toe.  Recent Doppler study on March 27, 2023 was negative for DVT.  She was started empirically on cephalexin for UTI and her Solorio catheter was exchanged.  Urine culture grew 50,000-100,000 colonies per mL E. coli, resistant to several antibiotics and therefore antibiotic regimen changed to nitrofurantoin.  On the day of discharge, she developed left first toe erythema and swelling suggestive of gout for which she was started on 5-day course of prednisone.  TCU was recommended per therapies.    Patient is admitted to this facility for medical management, nursing care, and rehab.     Of note, history was obtained from patient, facility RN, and extensive review of the chart.    Today's visit:  Patient was seen in her room, while lying bed.  She appears frail but comfortable.  She has cognitive impairment and is not able to provide much credible information.  Blood  pressure is running mild-moderately elevated.  She cannot recall when her last BM was.  She reports no fever, chills, chest pain, palpitation, dyspnea, nausea, vomiting, abdominal pain, or urinary symptoms.  She has chronic indwelling Solorio catheter.      CODE STATUS:   CPR/Full code     PAST MEDICAL HISTORY:   Dementia (SLUMS score 10/30 in June 2022)  Hypertension  DM type II; diet-controlled  CKD stage III, baseline creatinine 1-1.3  Chronic anemia, baseline Hgb 11-12  CVA  Urinary retention, s/p chronic indwelling Solorio catheter  Recurrent UTIs  Gastric ulcer  Gout  BPPV  Spinal spondylosis  Chronic low back pain  Osteoporosis  Vitamin D deficiency  COVID-19 infection in May 2022  MILLIE; not on CPAP  Morbid obesity, BMI 41    Past Medical History:   Diagnosis Date     Chronic kidney disease, stage III (moderate) (H)      Diabetes (H)      Hypertension      MILLIE (obstructive sleep apnea)      Urinary retention     Indwelling Solorio catheter       PAST SURGICAL HISTORY:   Past Surgical History:   Procedure Laterality Date     ORTHOPEDIC SURGERY      Bilateral knee replacement       FAMILY HISTORY:   Family History   Problem Relation Age of Onset     Alzheimer Disease Mother      Heart Disease Father        SOCIAL HISTORY:  Social History     Tobacco Use     Smoking status: Never     Smokeless tobacco: Never   Vaping Use     Vaping status: Not on file   Substance Use Topics     Alcohol use: Not Currently       MEDICATIONS:  Current Outpatient Medications   Medication Sig Dispense Refill     amLODIPine (NORVASC) 5 MG tablet Take 5 mg by mouth daily Hold for SBP less than 110       acetaminophen (TYLENOL) 500 MG tablet Take 500 mg by mouth every 6 hours as needed For pain       bumetanide (BUMEX) 0.5 MG tablet Take 0.5 mg by mouth daily       clopidogrel (PLAVIX) 75 MG tablet Take 75 mg by mouth daily       lidocaine (LIDODERM) 5 % patch Place onto the skin every 24 hours To prevent lidocaine toxicity, patient should be  "patch free for 12 hrs daily.       lisinopril (ZESTRIL) 20 MG tablet Take 20 mg by mouth daily       nystatin (MYCOSTATIN) 959308 UNIT/GM external powder Apply to abdominal and groin folds topically as needed for Candidiasis related to CANDIDIASIS OF SKIN AND NAIL (B37.2) BID PRN AND Apply to abdominal and groin folds topically every day and evening shift for candidiasis         MED REC REQUIRED  Post Medication Reconciliation Status: medication reconcilation previously completed during another office visit      ALLERGIES:  Allergies   Allergen Reactions     Aspirin      Sulfa Antibiotics [Sulfa Drugs]        ROS:  10 point ROS was attempted but was limited, given patient's underlying cognitive impairment. It was reviewed as much as possible as outlined in HPI.    PHYSICAL EXAM:  Vital signs were reviewed in the chart.  Vital Signs: BP (!) 163/97   Pulse 67   Temp 98.7  F (37.1  C)   Resp 20   Ht 1.6 m (5' 3\")   Wt 105.3 kg (232 lb 3.2 oz)   SpO2 95%   BMI 41.13 kg/m    General: Frail appearing but comfortable and in no acute distress  HEENT: No conjunctival pallor, no scleral icterus or injection, dry oral mucosa  Cardiovascular: Normal S1, S2, RRR  Respiratory: Lungs clear to auscultation bilaterally  GI: Abdomen soft, non-tender, non-distended, +BS  Extremities: Trace bilateral LE edema  Neuro: CX II-XII grossly intact; ROM in all four extremities grossly intact  Psych: Alert and oriented x 1-2  Skin: No acute rash    LABORATORY/IMAGING DATA:  All relevant labs and imaging data in [facility] and/or Care Everywhere were personally reviewed today.    Hematology profile (April 5, 2023): White count 8.1, hemoglobin 10.3, MCV 93, platelet count 203,000    Chemistry profile (April 7, 2023): Creatinine 1.22    Hematology profile (April 13, 2023): White count 12.5, hemoglobin 11.3, MCV 95, platelet count 355,000    Chemistry profile (April 13, 2023): Sodium 138, potassium 4.6, BUN 33.6, creatinine 1.4, glucose 151, " calcium 9.9      ASSESSMENT/PLAN:  Acute on chronic right knee pain,  Chronic low back pain,  Generalized weakness,  Physical deconditioning.  Patient has a history of bilateral knee replacement.  Imaging study negative for fracture or hardware failure.  Pain is fairly controlled.  Plan:  Fall precautions  Continue pain management with acetaminophen and lidocaine patch as ordered  Continue PT/OT evaluation and therapy    Acute gouty flare of left great toe.  Noted inpatient.    Completed 5-day course of prednisone on April 13.  Plan:  Monitor for recurrence    E. coli CAUTI.  Chronic urinary retention, s/p chronic indwelling Solorio catheter.  History of recurrent UTIs.  Solorio catheter was exchanged in the hospital on April 5.  Completed 5-day course of nitrofurantoin on April 12.  Plan:  Continue routine care of Solorio catheter  Follow-up with urology as directed    Essential hypertension,  Chronic bilateral lower extremity edema.   Blood pressure is mild-moderately elevated.  This note trace bilateral lower extremity edema on today's examination.  PTA bumetanide was restarted on April 13.  Plan:  Increase amlodipine from 2.5 to 5 mg daily  Continue bumetanide 0.5 mg daily  Continue lisinopril 20 mg daily  Monitor blood pressure and leg edema     DM type II; diet-controlled.  Last hemoglobin A1c was 6.5% in October 2022.  Plan:  Monitor glucose level PRN  Follow-up as outpatient     CKD stage III.  Baseline creatinine 1-1.3.  Last creatinine was 1.4 on April 13.  Plan:  Avoid NSAIDs and nephrotoxins  Encourage adequate oral hydration  Monitor renal function periodically     Chronic anemia.  Baseline Hgb 11-12.  Last hemoglobin was 11.3 on April 13.  Plan:  Monitor hemoglobin periodically     History of CVA.  Allergic to aspirin.  Plan:  Continue clopidogrel 75 mg p.o. daily      Dementia.  SLUMS 10/30 in June 2022.  On today's examination, she is oriented x1-2.  Plan:  Standard delirium precautions  Staff to assist  with daily care and mobility  Formal cognitive re-evaluation per OT     Morbid obesity, BMI 41,  MILLIE; not on CPAP.  Plan:  Staff to assist daily care and mobility  Monitor O2 sats          Orders written by provider at facility:  Increase amlodipine to 5 mg daily  Encourage adequate oral hydration      Disclaimer: This note may contain text created using speech-recognition software and may contain unintended word substitutions.      Electronically signed by:  Sabino Ashton MD

## 2023-04-20 NOTE — PROGRESS NOTES
"[facility] GERIATRICS    Chief Complaint   Patient presents with     RECHECK     HPI:  Sarah Noble is a 88 year old  (7/30/1934), who is being seen today for an episodic care visit at: [facility] (TCU) [[id number]].     This is a 87 yo female with PMHx for MILLIE, CKD stge 3a, CVA, Htn, chronic joseph who presented with weakness. Found to have UTI, given course of macrobid, also given prednisone per Gout flare. Of note PTA bumex on hold until prednisone course completed on 4/12/23. No other changes, discharged to [facility] for rehab.    Then on 4/17, send to hospital per CP, pain on R UE and bradycardia. UA positive, not given abx per being just treated. Dehydrated with Cr 1.94, Na 131, trop 42, per cardiology not worked up for ischemia. US neg for DVT on RUE. CXR neg. [facility]    Today's concern is:   Return from hospital, CKD    Allergies, and PMH/PSH reviewed in [facility] today.  REVIEW OF SYSTEMS:  4 point ROS including Respiratory, CV, GI and , other than that noted in the HPI,  is negative    Objective:   /79   Pulse 57   Temp 98.1  F (36.7  C)   Resp 18   Ht 1.6 m (5' 3\")   Wt 102.2 kg (225 lb 3.2 oz)   SpO2 98%   BMI 39.89 kg/m    GENERAL APPEARANCE:  Alert, cooperative, denies pain  RESP:  lungs clear to auscultation , no respiratory distress, RA  CV:  regular rate and rhythm, no murmur, rub, or gallop, peripheral edema 1+ in BLE, non-pitting  ABDOMEN:  normal bowel sounds, soft, nontender, no hepatosplenomegaly or other masses  :    joseph patent  PSYCH:  oriented to 1/2. SLUM 13/30    Labs  Na 142  K 4.0  Cr 1.48  GFR 34  WBC 7.4  Hgb 10.3    Assessment/Plan:    (T83.511D,  N39.0) Urinary tract infection associated with indwelling urethral catheter, subsequent encounter  Given course of macrobid x5d, completed course on 4/12. Joseph changed on 4/5. Recent UA positive, no other abx given     (E11.22,  N18.31) Type 2 diabetes mellitus with stage 3a chronic kidney " disease, without long-term current use of insulin (H)  Last A1c 6.5 in 10/2022, diet controlled     HTN  Continue amlodipine 2.5mg daily and today hold bumex. 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

## 2023-04-24 ENCOUNTER — LAB REQUISITION (OUTPATIENT)
Dept: LAB | Facility: CLINIC | Age: 88
End: 2023-04-24

## 2023-04-24 DIAGNOSIS — I12.9 HYPERTENSIVE CHRONIC KIDNEY DISEASE WITH STAGE 1 THROUGH STAGE 4 CHRONIC KIDNEY DISEASE, OR UNSPECIFIED CHRONIC KIDNEY DISEASE: ICD-10-CM

## 2023-04-25 LAB
ANION GAP SERPL CALCULATED.3IONS-SCNC: 11 MMOL/L (ref 7–15)
BUN SERPL-MCNC: 33.2 MG/DL (ref 8–23)
CALCIUM SERPL-MCNC: 10.3 MG/DL (ref 8.8–10.2)
CHLORIDE SERPL-SCNC: 100 MMOL/L (ref 98–107)
CREAT SERPL-MCNC: 1.41 MG/DL (ref 0.51–0.95)
DEPRECATED HCO3 PLAS-SCNC: 26 MMOL/L (ref 22–29)
ERYTHROCYTE [DISTWIDTH] IN BLOOD BY AUTOMATED COUNT: 12.5 % (ref 10–15)
GFR SERPL CREATININE-BSD FRML MDRD: 36 ML/MIN/1.73M2
GLUCOSE SERPL-MCNC: 257 MG/DL (ref 70–99)
HCT VFR BLD AUTO: 35.1 % (ref 35–47)
HGB BLD-MCNC: 11.1 G/DL (ref 11.7–15.7)
MCH RBC QN AUTO: 29.4 PG (ref 26.5–33)
MCHC RBC AUTO-ENTMCNC: 31.6 G/DL (ref 31.5–36.5)
MCV RBC AUTO: 93 FL (ref 78–100)
PLATELET # BLD AUTO: 410 10E3/UL (ref 150–450)
POTASSIUM SERPL-SCNC: 4.4 MMOL/L (ref 3.4–5.3)
RBC # BLD AUTO: 3.78 10E6/UL (ref 3.8–5.2)
SODIUM SERPL-SCNC: 137 MMOL/L (ref 136–145)
WBC # BLD AUTO: 6.6 10E3/UL (ref 4–11)

## 2023-04-25 PROCEDURE — 85027 COMPLETE CBC AUTOMATED: CPT | Performed by: NURSE PRACTITIONER

## 2023-04-25 PROCEDURE — 36415 COLL VENOUS BLD VENIPUNCTURE: CPT | Performed by: NURSE PRACTITIONER

## 2023-04-25 PROCEDURE — 80048 BASIC METABOLIC PNL TOTAL CA: CPT | Performed by: NURSE PRACTITIONER

## 2023-04-25 PROCEDURE — P9604 ONE-WAY ALLOW PRORATED TRIP: HCPCS | Performed by: NURSE PRACTITIONER

## 2023-04-25 NOTE — PROGRESS NOTES
"[facility] GERIATRICS    Chief Complaint   Patient presents with     RECHECK     HPI:  Sarah Noble is a 88 year old  (7/30/1934), who is being seen today for an episodic care visit at: [facility] (TCU) [[id number]].     This is a 89 yo female with PMHx for MILLIE, CKD stge 3a, CVA, Htn, chronic joesph who presented with weakness. Found to have UTI, given course of macrobid, also given prednisone per Gout flare. Of note PTA bumex on hold until prednisone course completed on 4/12/23. No other changes, discharged to [facility] for rehab.     Then on 4/17, send to hospital per CP, pain on R UE and bradycardia. UA positive, not given abx per being just treated. Dehydrated with Cr 1.94, Na 131, trop 42, per cardiology not worked up for ischemia. US neg for DVT on RUE. CXR neg. [facility]    Today's concern is:   Therapy progress    Allergies, and PMH/PSH reviewed in [facility] today.  REVIEW OF SYSTEMS:  4 point ROS including Respiratory, CV, GI and , other than that noted in the HPI,  is negative    Objective:   /74   Pulse 58   Temp 97  F (36.1  C)   Resp 18   Ht 1.6 m (5' 3\")   Wt 101 kg (222 lb 9.6 oz)   SpO2 97%   BMI 39.43 kg/m    GENERAL APPEARANCE:  Alert, cooperative, denies pain  RESP:  lungs clear to auscultation , no respiratory distress, RA  CV:  regular rate and rhythm, no murmur, rub, or gallop, peripheral edema 1+ in BLE, non-pitting  ABDOMEN:  normal bowel sounds, soft, nontender, no hepatosplenomegaly or other masses  :    joseph patent  PSYCH:  oriented to 1/2. SLUM 13/30      Most Recent 3 CBC's:Recent Labs   Lab Test 05/22/22  0713 05/20/22  0741 05/19/22  0740 05/18/22  0850 05/17/22  0757   WBC  --  4.4  --  3.6* 3.4*   HGB  --  11.2*  --  11.4* 11.1*   MCV  --  92  --  92 93    226 206 189 163     Most Recent 3 BMP's:Recent Labs   Lab Test 05/23/22  1111 05/23/22  0646 05/22/22  2129 05/22/22  0817 05/22/22  0713 05/19/22  0834 05/19/22  0740 " 05/18/22  1151 05/18/22  0850 05/17/22  0814 05/17/22  0757 05/16/22  0921 05/16/22  0748   NA  --   --   --   --   --   --   --   --  138  --  138  --  137   POTASSIUM  --   --   --   --   --   --   --   --  3.8  --  4.0  --  4.0   CHLORIDE  --   --   --   --   --   --   --   --  109  --  109  --  106   CO2  --   --   --   --   --   --   --   --  25  --  24  --  26   BUN  --   --   --   --   --   --   --   --  26  --  29  --  32*   CR  --   --   --   --  1.14*  --  0.96  --  0.89  --  0.92  --  1.08*   ANIONGAP  --   --   --   --   --   --   --   --  4  --  5  --  5   JN  --   --   --   --   --   --   --   --  9.2  --  8.7  --  8.4*   * 97 201*   < >  --    < > 99   < > 105*   < > 100*   < > 139*    < > = values in this interval not displayed.       Assessment/Plan:    (T83.511D,  N39.0) Urinary tract infection associated with indwelling urethral catheter, subsequent encounter  Given course of macrobid x5d, completed course on 4/12. Solorio changed on 4/5. Recent UA positive, no other abx given     (E11.22,  N18.31) Type 2 diabetes mellitus with stage 3a chronic kidney disease, without long-term current use of insulin (H)  Last A1c 6.5 in 10/2022, diet controlled     HTN  Continue amlodipine 2.5mg daily and holding bumex. 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

## 2023-05-02 NOTE — PROGRESS NOTES
"[facility] GERIATRICS    Chief Complaint   Patient presents with     RECHECK     HPI:  Sarah Noble is a 88 year old  (7/30/1934), who is being seen today for an episodic care visit at: [facility] (TCU) [[id number]].     This is a 89 yo female with PMHx for MILLIE, CKD stge 3a, CVA, Htn, chronic joseph who presented with weakness. Found to have UTI, given course of macrobid, also given prednisone per Gout flare. Of note PTA bumex on hold until prednisone course completed on 4/12/23. No other changes, discharged to [facility] for rehab.     Then on 4/17, send to hospital per CP, pain on R UE and bradycardia. UA positive, not given abx per being just treated. Dehydrated with Cr 1.94, Na 131, trop 42, per cardiology not worked up for ischemia. US neg for DVT on RUE. CXR neg. [facility].    Today's concern is:   Therapy progress    Allergies, and PMH/PSH reviewed in [facility] today.  REVIEW OF SYSTEMS:  4 point ROS including Respiratory, CV, GI and , other than that noted in the HPI,  is negative    Objective:   BP (!) 163/85   Pulse 55   Temp 97.7  F (36.5  C)   Resp 18   Ht 1.6 m (5' 3\")   Wt 101.2 kg (223 lb 3.2 oz)   SpO2 95%   BMI 39.54 kg/m    GENERAL APPEARANCE:  Alert, cooperative, denies pain  RESP:  lungs clear to auscultation , no respiratory distress, RA  CV:  regular rate and rhythm, no murmur, rub, or gallop, peripheral edema 1+ in BLE, non-pitting  ABDOMEN:  normal bowel sounds, soft, nontender, no hepatosplenomegaly or other masses  :    joseph patent  PSYCH:  oriented to 1/2. SLUM 13/30      Most Recent 3 CBC's:  Recent Labs   Lab Test 05/22/22  0713 05/20/22  0741 05/19/22  0740 05/18/22  0850 05/17/22  0757   WBC  --  4.4  --  3.6* 3.4*   HGB  --  11.2*  --  11.4* 11.1*   MCV  --  92  --  92 93    226 206 189 163     Most Recent 3 BMP's:  Recent Labs   Lab Test 05/23/22  1111 05/23/22  0646 05/22/22  2129 05/22/22  0817 05/22/22  0713 05/19/22  0834 " 05/19/22  0740 05/18/22  1151 05/18/22  0850 05/17/22  0814 05/17/22  0757 05/16/22  0921 05/16/22  0748   NA  --   --   --   --   --   --   --   --  138  --  138  --  137   POTASSIUM  --   --   --   --   --   --   --   --  3.8  --  4.0  --  4.0   CHLORIDE  --   --   --   --   --   --   --   --  109  --  109  --  106   CO2  --   --   --   --   --   --   --   --  25  --  24  --  26   BUN  --   --   --   --   --   --   --   --  26  --  29  --  32*   CR  --   --   --   --  1.14*  --  0.96  --  0.89  --  0.92  --  1.08*   ANIONGAP  --   --   --   --   --   --   --   --  4  --  5  --  5   JN  --   --   --   --   --   --   --   --  9.2  --  8.7  --  8.4*   * 97 201*   < >  --    < > 99   < > 105*   < > 100*   < > 139*    < > = values in this interval not displayed.       Assessment/Plan:    (T83.511D,  N39.0) Urinary tract infection associated with indwelling urethral catheter, chronic joseph  Given course of macrobid x5d, completed course on 4/12. Joseph changed on 4/5. Recent UA positive, no other abx given. monitor     (E11.22,  N18.31) Type 2 diabetes mellitus with stage 3a chronic kidney disease, without long-term current use of insulin (H)  Last A1c 6.5 in 10/2022, diet controlled     HTN  Continue amlodipine 2.5mg daily and holding bumex. 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 Intact     (F03.B0) Moderate dementia without behavioral disturbance (H)   No behaviors     (N18.32) Stage 3b chronic kidney disease (H)  Last Cr 1.41 with GFR 36 on 4/25     Normocytic anemia  Last Hgb 11.1 on 4/25     (G47.33) MILLIE (obstructive sleep apnea)  Continue CPAP if available     Therapy  Generalized weakness  Max  assist x1, limited progress. Using EZ stand    Orders:  NA    Electronically signed by: Nikhil Alexander NP

## 2023-05-03 PROBLEM — L89.613 PRESSURE ULCER OF RIGHT HEEL, STAGE 3 (H): Status: ACTIVE | Noted: 2023-01-01

## 2023-05-03 NOTE — PROGRESS NOTES
WOUND VISIT AT [facility]    ASSESSMENT:   1. (L89.613) Pressure ulcer of right heel, stage 3 (H)  2. Suspected PAD - absent pedal pulses    PLAN/DISCUSSION:   1. Wound care plan: Cleanse with wound cleanser. Cover wound bed with Iodosorb. Cover with Mepilex, or other silicone foam. Change daily and PRN.   2. Utilize heel suspension boots and float heels in bed with pillows beneath calves. Document any refusals.    3. If any worsening of wounds recommend discussion of vascular workup if patient would consider intervention if indicated.      HISTORY OF PRESENT ILLNESS:   Sarah Noble is a 88 year old female who is seen at [facility] today. Was recently hospitalized at [facility] from 4/17-4/19 for chest pain. I am seeing this patient today for a R heel ulcer. This was present while in hospital. Staff notes that she has a habit of rubbing this against the floor or her bed. Staff encouraging her to wear heel boots but she often gets confused. The area has been getting smaller while in TCU. Loose eschar present. She has no documented or reported hx of peripheral vascular disease.     Pmhx notable for dementia, type 2 DM, MILLIE, gout.    TREATMENT COURSE:  5/3/2023 : Initial visit at [facility].     MATTRESS:  Group 2 mattress    PHYSICAL EXAM:  GENERAL: Patient is alert and oriented and in no acute distress  CV: absent pedal pulses  INTEGUMENTARY:  eschar on R heel      PROCEDURE: Verbal consent was obtained by patient. A selective debridement was performed of non-viable tissue of <20cm2 using a tissue scissors. Hemostasis was achieved with digital pressure.     MDM: Moderate due to problems addressed, decision to perform procedure, review of at least 3 external notes.     Electronically signed by June Boswell PA-C on May 3, 2023

## 2023-05-05 NOTE — PROGRESS NOTES
"[facility] GERIATRICS    Chief Complaint   Patient presents with     RECHECK     HPI:  Sarah Noble is a 88 year old  (7/30/1934), who is being seen today for an episodic care visit at: [facility] (TCU) [[id number]].     This is a 87 yo female with PMHx for MILLIE, CKD stge 3a, CVA, Htn, chronic joseph who presented with weakness. Found to have UTI, given course of macrobid, also given prednisone per Gout flare. Of note PTA bumex on hold until prednisone course completed on 4/12/23. No other changes, discharged to [facility] for rehab.     Then on 4/17, send to hospital per CP, pain on R UE and bradycardia. UA positive, not given abx per being just treated. Dehydrated with Cr 1.94, Na 131, trop 42, per cardiology not worked up for ischemia. US neg for DVT on RUE. CXR neg. [facility]    Today's concern is:   WC request, therapy progress    Allergies, and PMH/PSH reviewed in [facility] today.  REVIEW OF SYSTEMS:  4 point ROS including Respiratory, CV, GI and , other than that noted in the HPI,  is negative    Objective:   BP (!) 146/80   Pulse 64   Temp 97.8  F (36.6  C)   Resp 18   Ht 1.6 m (5' 3\")   Wt 101.2 kg (223 lb 3.2 oz)   SpO2 98%   BMI 39.54 kg/m    GENERAL APPEARANCE:  Alert, cooperative, denies pain  RESP:  lungs clear to auscultation , no respiratory distress, RA  CV:  regular rate and rhythm, no murmur, rub, or gallop, peripheral edema 1+ in BLE, non-pitting  :    joseph patent  PSYCH:  oriented to 1/2. SLUM 13/30    Labs  See below    Assessment/Plan:    (T83.511D,  N39.0) Urinary tract infection associated with indwelling urethral catheter, chronic joseph  Given course of macrobid x5d, completed course on 4/12. Joseph changed on 4/5, due to be changed on 5/6 (receives course of abx after change). Recent UA positive, no other abx given. monitor     (E11.22,  N18.31) Type 2 diabetes mellitus with stage 3a chronic kidney disease, without long-term current use of insulin " (H)  Last A1c 6.5 in 10/2022, diet controlled     HTN  Continue amlodipine 2.5mg daily and holding bumex. 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 Intact     (F03.B0) Moderate dementia without behavioral disturbance (H)   No behaviors     (N18.32) Stage 3b chronic kidney disease (H)  Last Cr 1.41 with GFR 36 on 4/25     Normocytic anemia  Last Hgb 11.1 on 4/25     (G47.33) MILLIE (obstructive sleep apnea)  Continue CPAP if available     Therapy  Generalized weakness  Max assist x1, limited progress. Using EZ stand. WC requested    Orders:  WC    Electronically signed by: Nikhil Alexander NP     [facility] GERIATRICS  Face to Face and Medical Necessity Statement for DME Provider visit    Patient: Sarah Noble  Gender: female  YOB: 1934  [place] Medical Record Number: [id number]  Demographics:  [address] [place] [id number] [phone] (home)   Social Security Number:   Primary Care Provider: Luther Banerjee  Insurance: Payor: MEDICARE / Plan: MEDICARE / Product Type: Medicare /     HPI: Sarah Noble is a 88 year old (7/30/1934), who is being seen today for a face to face provider visit at [facility] ([facility] [[id number]]. Medical necessity statement for DME included.     This patient requires the following: DME Ordered and Medical Necessity Statement     Wheelchair Documentation  Size: 18 x 20  Corresponding cushion: Yes  Standard foot rests: Yes  Elevating leg rests: Yes  Arm rests: Yes  Lap tray: No  Dose the patient use oxygen? No   Is the patient able to propel wheelchair? Yes  The patient has mobility limitations that impairs their ability to  "participate in one or more mobility related activities: Toileting, Feeding, Grooming and Bathing. The wheelchair is suitable and necessary for use in the patient's home. The patient's mobility limitations cannot be safely resolved by using a cane/walker: Reason why a cane or walker will not meet the patient's needs. (ie: balance, tolerance, level of assistance) all issues  The patients home has adequate access to use a manual wheelchair. The use of a manual wheelchair on a regular basis will improve the patients ability to participate in mobility related ADL's at home. The patient is willing to use a manual wheelchair at home. The patient has adequate upper body strength and the mental capability to safely use a manual wheelchair and/or has a caregiver that is able to assist.  The patient has lower extremity edema.  Reason for Type of Wheelchair  Patient weight: 0 lbs 0 oz  Light Weight Wheelchair: Patient is unable to self-propel a standard wheelchair in the home but can self propel a light weight wheelchair.     Patient needs chasity height wheelchair due to short stature and needs to place feet on ground for propulsion      Pt needing above DME with expected length of need of 99  months due to medical necessity associated with following diagnosis:  Data Unavailable      Past Medical History:   has a past medical history of Chronic kidney disease, stage III (moderate) (H), Diabetes (H), Hypertension, MILLIE (obstructive sleep apnea), and Urinary retention.    Review of Systems:  See above    Exam:  Vitals: BP (!) 146/80   Pulse 64   Temp 97.8  F (36.6  C)   Resp 18   Ht 1.6 m (5' 3\")   Wt 101.2 kg (223 lb 3.2 oz)   SpO2 98%   BMI 39.54 kg/m    BMI: Body mass index is 39.54 kg/m .  See above    Assessment/Plan:  No diagnosis found.    Orders:  1. Facility staff/TC to contact DME company to get their order form for provider to fill out    ELECTRONICALLY SIGNED BY HELENE CERTIFIED PROVIDER: Nikhil Alexander NP   NPI: " [id number]  [facility] GERIATRICS  [address] [place], [place] [address] [place] [address]

## 2023-05-10 NOTE — PROGRESS NOTES
"[facility] GERIATRICS    Chief Complaint   Patient presents with     RECHECK     HPI:  Sarah Noble is a 88 year old  (7/30/1934), who is being seen today for an episodic care visit at: [facility] (TCU) [[id number]].     This is a 87 yo female with PMHx for MILLIE, CKD stge 3a, CVA, Htn, chronic joseph who presented with weakness. Found to have UTI, given course of macrobid, also given prednisone per Gout flare. Of note PTA bumex on hold until prednisone course completed on 4/12/23. No other changes, discharged to [facility] for rehab.     Then on 4/17, send to hospital per CP, pain on R UE and bradycardia. UA positive, not given abx per being just treated. Dehydrated with Cr 1.94, Na 131, trop 42, per cardiology not worked up for ischemia. US neg for DVT on RUE. CXR neg. [facility]    Today's concern is:  Therapy progress    Allergies, and PMH/PSH reviewed in [facility] today.  REVIEW OF SYSTEMS:  4 point ROS including Respiratory, CV, GI and , other than that noted in the HPI,  is negative    Objective:   BP (!) 162/88   Pulse 54   Temp 97.6  F (36.4  C)   Resp 16   Ht 1.6 m (5' 3\")   Wt 102.3 kg (225 lb 9.6 oz)   SpO2 99%   BMI 39.96 kg/m    GENERAL APPEARANCE:  Alert, cooperative, denies pain  RESP:  lungs clear to auscultation , no respiratory distress, RA  CV:  regular rate and rhythm, no murmur, rub, or gallop, peripheral edema 1+ in BLE, non-pitting  :    joseph patent, draining reanna urine  PSYCH:  oriented to 1/2. SLUM 13/30    Labs  See below    Assessment/Plan:    (T83.511D,  N39.0) Urinary tract infection associated with indwelling urethral catheter, chronic joseph  Given course of macrobid x5d, completed course on 4/12. Joseph changed on 5/6 (receives course of abx after change). Recent UA positive, no other abx given. monitor     (E11.22,  N18.31) Type 2 diabetes mellitus with stage 3a chronic kidney disease, without long-term current use of insulin (H)  Last A1c 6.5 in " 10/2022, diet controlled     HTN  Continue amlodipine 2.5mg daily and holding bumex. 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 Monitor for reoccurrance     R heel PU  Stage 2, stable. Intact     (F03.B0) Moderate dementia without behavioral disturbance (H)   No behaviors, SLUMS 13/30     (N18.32) Stage 3b chronic kidney disease (H)  Last Cr 1.41 with GFR 36 on 4/25     Normocytic anemia  Last Hgb 11.1 on 4/25     (G47.33) MILLIE (obstructive sleep apnea)  Continue CPAP      Therapy  Generalized weakness  Max assist x1, limited progress. Using EZ stand mostly, also in parallel bars <10ft. WC requested prior    Orders:  NA    Electronically signed by: Nikhil Alexander NP

## 2023-05-17 NOTE — PROGRESS NOTES
[facility] GERIATRICS DISCHARGE SUMMARY  PATIENT'S NAME: Sarah Noble  YOB: 1934  MEDICAL RECORD NUMBER:  [id number]  Place of Service where encounter took place:  [facility] ([facility]) [[id number]]    PRIMARY CARE PROVIDER AND CLINIC RESPONSIBLE AFTER TRANSFER:   Luther Banerjee MD, [facility] [address]  / [facility] [address]    Non-FMG Provider     Transferring providers: Nikhil Alexander NP, Dr. Daisha MD  Recent Hospitalization/ED:  [facility]  stay 4/5/23 to 4/7/23.  Date of SNF Admission: 4/7/23  Date of SNF (anticipated) Discharge: May 21, 2023  Discharged to: with family   Cognitive Scores: SLUMS: 13/30  Physical Function: Wheelchair dependent  DME: No new DME needed    CODE STATUS/ADVANCE DIRECTIVES DISCUSSION:  Prior   ALLERGIES: Aspirin and Sulfa antibiotics [sulfa antibiotics]    HPI  This is a 87 yo female with PMHx for MILLIE, CKD stge 3a, CVA, Htn, chronic joseph who presented with weakness. Found to have UTI, given course of macrobid, also given prednisone per Gout flare. Of note PTA bumex on hold until prednisone course completed on 4/12/23. No other changes, discharged to [facility] for rehab.     Then on 4/17, send to hospital per CP, pain on R UE and bradycardia. UA positive, not given abx per being just treated. Dehydrated with Cr 1.94, Na 131, trop 42, per cardiology not worked up for ischemia. US neg for DVT on RUE. CXR neg. [facility]    Summary of nursing facility stay:     (T83.511D,  N39.0) Urinary tract infection associated with indwelling urethral catheter, chronic joseph  Given course of macrobid x5d, completed course on 4/12. Joseph changed on 5/6 (receives course of abx after change). Recent UA positive, no other abx given     (E11.22,  N18.31) Type 2 diabetes mellitus with stage 3a chronic kidney disease, without long-term current use of insulin (H)  Last A1c 6.5 in 10/2022, diet  controlled     HTN  Continue amlodipine 2.5mg daily and holding bumex. 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  Prior received a course of prednisone, now completed     R heel PU  Stage 2, stable     (F03.B0) Moderate dementia without behavioral disturbance (H)   No behaviors, SLUMS 13/30     (N18.32) Stage 3b chronic kidney disease (H)  Last Cr 1.41 with GFR 36 on 4/25     Normocytic anemia  Last Hgb 11.1 on 4/25     (G47.33) MILLIE (obstructive sleep apnea)  Continue CPAP     Discharge Medications:    Current Outpatient Medications   Medication Sig Dispense Refill     acetaminophen (TYLENOL) 500 MG tablet Take 500 mg by mouth every 6 hours as needed For pain       amLODIPine (NORVASC) 5 MG tablet Take 2.5 mg by mouth daily Hold for SBP less than 110       bumetanide (BUMEX) 0.5 MG tablet Take 0.5 mg by mouth daily (Patient not taking: Reported on 5/18/2023)       clopidogrel (PLAVIX) 75 MG tablet Take 75 mg by mouth daily       lidocaine (LIDODERM) 5 % patch Place onto the skin every 24 hours To prevent lidocaine toxicity, patient should be patch free for 12 hrs daily.       nystatin (MYCOSTATIN) 696169 UNIT/GM external powder Apply to abdominal and groin folds topically as needed for Candidiasis related to CANDIDIASIS OF SKIN AND NAIL (B37.2) BID PRN AND Apply to abdominal and groin folds topically every day and evening shift for candidiasis       Controlled medications:   not applicable/none     Past Medical History:   Past Medical History:   Diagnosis Date     Chronic kidney disease, stage III (moderate) (H)      Diabetes (H)      Hypertension      MILLIE (obstructive sleep apnea)      Urinary retention     Indwelling Solorio catheter     Physical Exam:   Vitals: /78   Pulse 58   Temp 98  F (36.7  C)   Resp  "18   Ht 1.6 m (5' 3\")   Wt 102.6 kg (226 lb 3.2 oz)   SpO2 98%   BMI 40.07 kg/m    BMI: Body mass index is 40.07 kg/m .  GENERAL APPEARANCE:  Alert, cooperative, denies pain  RESP:  lungs clear to auscultation , no respiratory distress, RA  CV:  regular rate and rhythm, no murmur, rub, or gallop, peripheral edema 1+ in BLE, non-pitting  :    joseph patent, draining reanna urine  PSYCH:  oriented to 1/2. SLUM 13/30    SNF labs:   Most Recent 3 CBC's:Recent Labs   Lab Test 05/22/22  0713 05/20/22  0741 05/19/22  0740 05/18/22  0850 05/17/22  0757   WBC  --  4.4  --  3.6* 3.4*   HGB  --  11.2*  --  11.4* 11.1*   MCV  --  92  --  92 93    226 206 189 163     Most Recent 3 BMP's:Recent Labs   Lab Test 05/23/22  1111 05/23/22  0646 05/22/22 2129 05/22/22  0817 05/22/22  0713 05/19/22  0834 05/19/22  0740 05/18/22  1151 05/18/22  0850 05/17/22  0814 05/17/22  0757 05/16/22  0921 05/16/22  0748   NA  --   --   --   --   --   --   --   --  138  --  138  --  137   POTASSIUM  --   --   --   --   --   --   --   --  3.8  --  4.0  --  4.0   CHLORIDE  --   --   --   --   --   --   --   --  109  --  109  --  106   CO2  --   --   --   --   --   --   --   --  25  --  24  --  26   BUN  --   --   --   --   --   --   --   --  26  --  29  --  32*   CR  --   --   --   --  1.14*  --  0.96  --  0.89  --  0.92  --  1.08*   ANIONGAP  --   --   --   --   --   --   --   --  4  --  5  --  5   JN  --   --   --   --   --   --   --   --  9.2  --  8.7  --  8.4*   * 97 201*   < >  --    < > 99   < > 105*   < > 100*   < > 139*    < > = values in this interval not displayed.       DISCHARGE PLAN:    Follow up labs: No labs orders/due    Medical Follow Up:      Follow up with primary care provider in 1-2 weeks    [facility] scheduled appointments:       Discharge Services: Home Care:  Occupational Therapy, Physical Therapy, Registered Nurse and Home Health Aide    Discharge Instructions Verbalized to Patient at Discharge: "     None    TOTAL DISCHARGE TIME:   Greater than 30 minutes  Electronically signed by:  Nikhil Alexander NP     Documentation of Face to Face and Certification for Home Health Services    I certify that patient: Sarah Noble is under my care and that I, or a nurse practitioner or physician's assistant working with me, had a face-to-face encounter that meets the physician face-to-face encounter requirements with this patient on: 5/18/2023.    This encounter with the patient was in whole, or in part, for the following medical condition, which is the primary reason for home health care: medication management.    I certify that, based on my findings, the following services are medically necessary home health services: Nursing, Occupational Therapy and Physical Therapy.    My clinical findings support the need for the above services because: Nurse is needed: To provide assessment and oversight required in the home to assure adherence to the medical plan due to: medication management.., Occupational Therapy Services are needed to assess and treat cognitive ability and address ADL safety due to impairment in cognitive. and Physical Therapy Services are needed to assess and treat the following functional impairments: falls risk.    Further, I certify that my clinical findings support that this patient is homebound (i.e. absences from home require considerable and taxing effort and are for medical reasons or Confucianist services or infrequently or of short duration when for other reasons) because: Requires assistance of another person or specialized equipment to access medical services because patient: Is unable to operate assistive equipment on their own. and Requires supervision of another for safe transfer...    Based on the above findings. I certify that this patient is confined to the home and needs intermittent skilled nursing care, physical therapy and/or speech therapy.  The patient is under my care, and I have  initiated the establishment of the plan of care.  This patient will be followed by a physician who will periodically review the plan of care.  Physician/Provider to provide follow up care: Luther Banerjee    Attending hospital physician (the Medicare certified [facility] provider): Nikhil Alexander NP  Physician Signature: See electronic signature associated with these discharge orders.  Date: 5/18/2023